# Patient Record
Sex: MALE | Race: WHITE | NOT HISPANIC OR LATINO | Employment: OTHER | URBAN - METROPOLITAN AREA
[De-identification: names, ages, dates, MRNs, and addresses within clinical notes are randomized per-mention and may not be internally consistent; named-entity substitution may affect disease eponyms.]

---

## 2017-04-14 ENCOUNTER — HOSPITAL ENCOUNTER (OUTPATIENT)
Dept: CARDIOLOGY | Facility: HOSPITAL | Age: 72
Discharge: HOME | End: 2017-04-14

## 2017-04-19 ENCOUNTER — APPOINTMENT (OUTPATIENT)
Dept: CARDIOLOGY | Facility: HOSPITAL | Age: 72
End: 2017-04-19

## 2017-06-23 ENCOUNTER — HOSPITAL ENCOUNTER (OUTPATIENT)
Dept: CARDIOLOGY | Facility: HOSPITAL | Age: 72
Discharge: HOME | End: 2017-06-23
Admitting: SURGERY

## 2017-06-23 ENCOUNTER — TRANSCRIBE ORDERS (OUTPATIENT)
Dept: ADMINISTRATIVE | Facility: HOSPITAL | Age: 72
End: 2017-06-23

## 2017-06-23 DIAGNOSIS — I73.9 CLAUDICATION (HCC): Primary | ICD-10-CM

## 2017-06-23 DIAGNOSIS — I73.9 CLAUDICATION (HCC): ICD-10-CM

## 2017-06-23 LAB
BH CV LOWER ARTERIAL LEFT ABI RATIO: 1.06
BH CV LOWER ARTERIAL LEFT DORSALIS PEDIS SYS MAX: 157 MMHG
BH CV LOWER ARTERIAL LEFT GREAT TOE SYS MAX: 143 MMHG
BH CV LOWER ARTERIAL LEFT POST EX ABI RATIO: 1.02
BH CV LOWER ARTERIAL LEFT POST TIBIAL SYS MAX: 166 MMHG
BH CV LOWER ARTERIAL LEFT TBI RATIO: 0.92
BH CV LOWER ARTERIAL RIGHT ABI RATIO: 1.06
BH CV LOWER ARTERIAL RIGHT DORSALIS PEDIS SYS MAX: 157 MMHG
BH CV LOWER ARTERIAL RIGHT GREAT TOE SYS MAX: 141 MMHG
BH CV LOWER ARTERIAL RIGHT POST EX ABI RATIO: 1.04
BH CV LOWER ARTERIAL RIGHT POST TIBIAL SYS MAX: 165 MMHG
BH CV LOWER ARTERIAL RIGHT TBI RATIO: 0.9
UPPER ARTERIAL LEFT ARM BRACHIAL SYS MAX: 151 MMHG
UPPER ARTERIAL RIGHT ARM BRACHIAL SYS MAX: 156 MMHG

## 2017-06-23 PROCEDURE — 93924 LWR XTR VASC STDY BILAT: CPT

## 2021-03-15 ENCOUNTER — BULK ORDERING (OUTPATIENT)
Dept: CASE MANAGEMENT | Facility: OTHER | Age: 76
End: 2021-03-15

## 2021-03-15 DIAGNOSIS — Z23 IMMUNIZATION DUE: ICD-10-CM

## 2022-12-12 ENCOUNTER — APPOINTMENT (OUTPATIENT)
Dept: PREADMISSION TESTING | Facility: HOSPITAL | Age: 77
End: 2022-12-12

## 2022-12-30 ENCOUNTER — PRE-ADMISSION TESTING (OUTPATIENT)
Dept: PREADMISSION TESTING | Facility: HOSPITAL | Age: 77
End: 2022-12-30
Payer: MEDICARE

## 2022-12-30 VITALS
RESPIRATION RATE: 18 BRPM | WEIGHT: 179 LBS | HEART RATE: 59 BPM | TEMPERATURE: 97.6 F | HEIGHT: 72 IN | SYSTOLIC BLOOD PRESSURE: 178 MMHG | OXYGEN SATURATION: 99 % | BODY MASS INDEX: 24.24 KG/M2 | DIASTOLIC BLOOD PRESSURE: 75 MMHG

## 2022-12-30 LAB
ANION GAP SERPL CALCULATED.3IONS-SCNC: 8 MMOL/L (ref 5–15)
BUN SERPL-MCNC: 19 MG/DL (ref 8–23)
BUN/CREAT SERPL: 8.1 (ref 7–25)
CALCIUM SPEC-SCNC: 8.5 MG/DL (ref 8.6–10.5)
CHLORIDE SERPL-SCNC: 102 MMOL/L (ref 98–107)
CO2 SERPL-SCNC: 28 MMOL/L (ref 22–29)
CREAT SERPL-MCNC: 2.34 MG/DL (ref 0.76–1.27)
DEPRECATED RDW RBC AUTO: 43.1 FL (ref 37–54)
EGFRCR SERPLBLD CKD-EPI 2021: 27.9 ML/MIN/1.73
ERYTHROCYTE [DISTWIDTH] IN BLOOD BY AUTOMATED COUNT: 13 % (ref 12.3–15.4)
GLUCOSE SERPL-MCNC: 147 MG/DL (ref 65–99)
HCT VFR BLD AUTO: 34 % (ref 37.5–51)
HGB BLD-MCNC: 11.3 G/DL (ref 13–17.7)
MCH RBC QN AUTO: 30.1 PG (ref 26.6–33)
MCHC RBC AUTO-ENTMCNC: 33.2 G/DL (ref 31.5–35.7)
MCV RBC AUTO: 90.4 FL (ref 79–97)
PLATELET # BLD AUTO: 335 10*3/MM3 (ref 140–450)
PMV BLD AUTO: 9.9 FL (ref 6–12)
POTASSIUM SERPL-SCNC: 5.1 MMOL/L (ref 3.5–5.2)
RBC # BLD AUTO: 3.76 10*6/MM3 (ref 4.14–5.8)
SODIUM SERPL-SCNC: 138 MMOL/L (ref 136–145)
WBC NRBC COR # BLD: 7.67 10*3/MM3 (ref 3.4–10.8)

## 2022-12-30 PROCEDURE — 80048 BASIC METABOLIC PNL TOTAL CA: CPT

## 2022-12-30 PROCEDURE — 85027 COMPLETE CBC AUTOMATED: CPT

## 2022-12-30 PROCEDURE — 36415 COLL VENOUS BLD VENIPUNCTURE: CPT

## 2022-12-30 RX ORDER — PSEUDOEPHEDRINE HCL 30 MG
100 TABLET ORAL AS NEEDED
COMMUNITY
Start: 2022-06-13

## 2022-12-30 RX ORDER — ALBUTEROL SULFATE 90 UG/1
2 AEROSOL, METERED RESPIRATORY (INHALATION) AS NEEDED
COMMUNITY
Start: 2022-12-16

## 2022-12-30 RX ORDER — DEXTROMETHORPHAN HYDROBROMIDE AND PROMETHAZINE HYDROCHLORIDE 15; 6.25 MG/5ML; MG/5ML
5 SYRUP ORAL AS NEEDED
COMMUNITY
Start: 2022-12-06

## 2022-12-30 RX ORDER — GUAIFENESIN 600 MG/1
2 TABLET, EXTENDED RELEASE ORAL DAILY
COMMUNITY
Start: 2022-12-20

## 2022-12-30 RX ORDER — MAGNESIUM OXIDE 400 MG/1
1 TABLET ORAL 2 TIMES DAILY
COMMUNITY
Start: 2022-12-16

## 2022-12-30 NOTE — DISCHARGE INSTRUCTIONS
Take the following medications the morning of surgery with a small sip of water:  AMLODIPINE, COREG, GABAPENTIN, PROTONIX    ARRIVE FOR SURGERY AT 2:00 PM      If you are on prescription narcotic pain medication to control your pain you may also take that medication the morning of surgery.    General Instructions:  Do not eat or drink anything after midnight the night before surgery.  Infants may have breast milk up to four hours before surgery.  Infants drinking formula may drink formula up to six hours before surgery.   Patients who avoid smoking, chewing tobacco and alcohol for 4 weeks prior to surgery have a reduced risk of post-operative complications.  Quit smoking as many days before surgery as you can.  Do not smoke, use chewing tobacco or drink alcohol the day of surgery.   If applicable bring your C-PAP/ BI-PAP machine.  Bring any papers given to you in the doctor’s office.  Wear clean comfortable clothes.  Do not wear contact lenses, false eyelashes or make-up.  Bring a case for your glasses.   Bring crutches or walker if applicable.  Remove all piercings.  Leave jewelry and any other valuables at home.  Hair extensions with metal clips must be removed prior to surgery.  The Pre-Admission Testing nurse will instruct you to bring medications if unable to obtain an accurate list in Pre-Admission Testing.        If you were given a blood bank ID arm band remember to bring it with you the day of surgery.    Preventing a Surgical Site Infection:  For 2 to 3 days before surgery, avoid shaving with a razor because the razor can irritate skin and make it easier to develop an infection.    Any areas of open skin can increase the risk of a post-operative wound infection by allowing bacteria to enter and travel throughout the body.  Notify your surgeon if you have any skin wounds / rashes even if it is not near the expected surgical site.  The area will need assessed to determine if surgery should be delayed until  it is healed.  The night prior to surgery shower using a fresh bar of anti-bacterial soap (such as Dial) and clean washcloth.  Sleep in a clean bed with clean clothing.  Do not allow pets to sleep with you.  Shower on the morning of surgery using a fresh bar of anti-bacterial soap (such as Dial) and clean washcloth.  Dry with a clean towel and dress in clean clothing.  Ask your surgeon if you will be receiving antibiotics prior to surgery.  Make sure you, your family, and all healthcare providers clean their hands with soap and water or an alcohol based hand  before caring for you or your wound.    Day of surgery:  Your arrival time is approximately two hours before your scheduled surgery time.  Upon arrival, a Pre-op nurse and Anesthesiologist will review your health history, obtain vital signs, and answer questions you may have.  The only belongings needed at this time will be your home medications and if applicable your C-PAP/BI-PAP machine.  A Pre-op nurse will start an IV and you may receive medication in preparation for surgery, including something to help you relax.      Please be aware that surgery does come with discomfort.  We want to make every effort to control your discomfort so please discuss any uncontrolled symptoms with your nurse.   Your doctor will most likely have prescribed pain medications.      If you are going home after surgery you will receive individualized written care instructions before being discharged.  A responsible adult must drive you to and from the hospital on the day of your surgery and stay with you for 24 hours.  Discharge prescriptions can be filled by the hospital pharmacy during regular pharmacy hours.  If you are having surgery late in the day/evening your prescription may be e-prescribed to your pharmacy.  Please verify your pharmacy hours or chose a 24 hour pharmacy to avoid not having access to your prescription because your pharmacy has closed for the  day.    If you are staying overnight following surgery, you will be transported to your hospital room following the recovery period.  [facility] has all private rooms.    If you have any questions please call Pre-Admission Testing at [phone].  Deductibles and co-payments are collected on the day of service. Please be prepared to pay the required co-pay, deductible or deposit on the day of service as defined by your plan.    Call your surgeon immediately if you experience any of the following symptoms:  Sore Throat  Shortness of Breath or difficulty breathing  Cough  Chills  Body soreness or muscle pain  Headache  Fever  New loss of taste or smell  Do not arrive for your surgery ill.  Your procedure will need to be rescheduled to another time.  You will need to call your physician before the day of surgery to avoid any unnecessary exposure to hospital staff as well as other patients.

## 2023-01-06 ENCOUNTER — ANESTHESIA EVENT (OUTPATIENT)
Dept: PERIOP | Facility: HOSPITAL | Age: 78
End: 2023-01-06
Payer: MEDICARE

## 2023-01-06 ENCOUNTER — HOSPITAL ENCOUNTER (OUTPATIENT)
Facility: HOSPITAL | Age: 78
Setting detail: HOSPITAL OUTPATIENT SURGERY
Discharge: HOME | End: 2023-01-06
Attending: UROLOGY | Admitting: UROLOGY
Payer: MEDICARE

## 2023-01-06 ENCOUNTER — ANESTHESIA (OUTPATIENT)
Dept: PERIOP | Facility: HOSPITAL | Age: 78
End: 2023-01-06
Payer: MEDICARE

## 2023-01-06 ENCOUNTER — APPOINTMENT (OUTPATIENT)
Dept: GENERAL RADIOLOGY | Facility: HOSPITAL | Age: 78
End: 2023-01-06
Payer: MEDICARE

## 2023-01-06 VITALS
RESPIRATION RATE: 16 BRPM | HEART RATE: 58 BPM | SYSTOLIC BLOOD PRESSURE: 173 MMHG | TEMPERATURE: 97.6 F | OXYGEN SATURATION: 95 % | DIASTOLIC BLOOD PRESSURE: 67 MMHG

## 2023-01-06 DIAGNOSIS — C67.9 BLADDER CANCER: ICD-10-CM

## 2023-01-06 LAB
GLUCOSE BLDC GLUCOMTR-MCNC: 77 MG/DL (ref 70–130)
GLUCOSE BLDC GLUCOMTR-MCNC: 93 MG/DL (ref 70–130)
INR PPP: 1.03 (ref 0.9–1.1)
PROTHROMBIN TIME: 13.7 SECONDS (ref 11.7–14.2)

## 2023-01-06 PROCEDURE — 25010000002 MIDAZOLAM PER 1 MG: Performed by: ANESTHESIOLOGY

## 2023-01-06 PROCEDURE — 74420 UROGRAPHY RTRGR +-KUB: CPT

## 2023-01-06 PROCEDURE — 85610 PROTHROMBIN TIME: CPT | Performed by: UROLOGY

## 2023-01-06 PROCEDURE — 88342 IMHCHEM/IMCYTCHM 1ST ANTB: CPT | Performed by: UROLOGY

## 2023-01-06 PROCEDURE — C1758 CATHETER, URETERAL: HCPCS | Performed by: UROLOGY

## 2023-01-06 PROCEDURE — 88305 TISSUE EXAM BY PATHOLOGIST: CPT | Performed by: UROLOGY

## 2023-01-06 PROCEDURE — 25010000002 PROPOFOL 10 MG/ML EMULSION: Performed by: STUDENT IN AN ORGANIZED HEALTH CARE EDUCATION/TRAINING PROGRAM

## 2023-01-06 PROCEDURE — 88341 IMHCHEM/IMCYTCHM EA ADD ANTB: CPT | Performed by: UROLOGY

## 2023-01-06 PROCEDURE — 25010000002 CEFAZOLIN IN DEXTROSE 2-4 GM/100ML-% SOLUTION: Performed by: UROLOGY

## 2023-01-06 PROCEDURE — 82962 GLUCOSE BLOOD TEST: CPT

## 2023-01-06 PROCEDURE — 25010000002 IOPAMIDOL 61 % SOLUTION: Performed by: UROLOGY

## 2023-01-06 RX ORDER — EPHEDRINE SULFATE 50 MG/ML
INJECTION INTRAVENOUS AS NEEDED
Status: DISCONTINUED | OUTPATIENT
Start: 2023-01-06 | End: 2023-01-06 | Stop reason: SURG

## 2023-01-06 RX ORDER — HYDRALAZINE HYDROCHLORIDE 20 MG/ML
5 INJECTION INTRAMUSCULAR; INTRAVENOUS
Status: DISCONTINUED | OUTPATIENT
Start: 2023-01-06 | End: 2023-01-06 | Stop reason: HOSPADM

## 2023-01-06 RX ORDER — FENTANYL CITRATE 50 UG/ML
50 INJECTION, SOLUTION INTRAMUSCULAR; INTRAVENOUS
Status: DISCONTINUED | OUTPATIENT
Start: 2023-01-06 | End: 2023-01-06 | Stop reason: HOSPADM

## 2023-01-06 RX ORDER — NALOXONE HCL 0.4 MG/ML
0.2 VIAL (ML) INJECTION AS NEEDED
Status: DISCONTINUED | OUTPATIENT
Start: 2023-01-06 | End: 2023-01-06 | Stop reason: HOSPADM

## 2023-01-06 RX ORDER — LIDOCAINE HYDROCHLORIDE 10 MG/ML
0.5 INJECTION, SOLUTION EPIDURAL; INFILTRATION; INTRACAUDAL; PERINEURAL ONCE AS NEEDED
Status: DISCONTINUED | OUTPATIENT
Start: 2023-01-06 | End: 2023-01-06 | Stop reason: HOSPADM

## 2023-01-06 RX ORDER — HYDROCODONE BITARTRATE AND ACETAMINOPHEN 5; 325 MG/1; MG/1
1 TABLET ORAL EVERY 6 HOURS PRN
Qty: 20 TABLET | Refills: 0 | Status: SHIPPED | OUTPATIENT
Start: 2023-01-06

## 2023-01-06 RX ORDER — CEFAZOLIN SODIUM 2 G/100ML
2 INJECTION, SOLUTION INTRAVENOUS ONCE
Status: COMPLETED | OUTPATIENT
Start: 2023-01-06 | End: 2023-01-06

## 2023-01-06 RX ORDER — FLUMAZENIL 0.1 MG/ML
0.2 INJECTION INTRAVENOUS AS NEEDED
Status: DISCONTINUED | OUTPATIENT
Start: 2023-01-06 | End: 2023-01-06 | Stop reason: HOSPADM

## 2023-01-06 RX ORDER — PROPOFOL 10 MG/ML
VIAL (ML) INTRAVENOUS AS NEEDED
Status: DISCONTINUED | OUTPATIENT
Start: 2023-01-06 | End: 2023-01-06 | Stop reason: SURG

## 2023-01-06 RX ORDER — ONDANSETRON 2 MG/ML
4 INJECTION INTRAMUSCULAR; INTRAVENOUS ONCE AS NEEDED
Status: DISCONTINUED | OUTPATIENT
Start: 2023-01-06 | End: 2023-01-06 | Stop reason: HOSPADM

## 2023-01-06 RX ORDER — EPHEDRINE SULFATE 50 MG/ML
5 INJECTION, SOLUTION INTRAVENOUS ONCE AS NEEDED
Status: DISCONTINUED | OUTPATIENT
Start: 2023-01-06 | End: 2023-01-06 | Stop reason: HOSPADM

## 2023-01-06 RX ORDER — SODIUM CHLORIDE 0.9 % (FLUSH) 0.9 %
3 SYRINGE (ML) INJECTION EVERY 12 HOURS SCHEDULED
Status: DISCONTINUED | OUTPATIENT
Start: 2023-01-06 | End: 2023-01-06 | Stop reason: HOSPADM

## 2023-01-06 RX ORDER — SODIUM CHLORIDE, SODIUM LACTATE, POTASSIUM CHLORIDE, CALCIUM CHLORIDE 600; 310; 30; 20 MG/100ML; MG/100ML; MG/100ML; MG/100ML
9 INJECTION, SOLUTION INTRAVENOUS CONTINUOUS
Status: DISCONTINUED | OUTPATIENT
Start: 2023-01-06 | End: 2023-01-06 | Stop reason: HOSPADM

## 2023-01-06 RX ORDER — LIDOCAINE HYDROCHLORIDE 20 MG/ML
INJECTION, SOLUTION INFILTRATION; PERINEURAL AS NEEDED
Status: DISCONTINUED | OUTPATIENT
Start: 2023-01-06 | End: 2023-01-06 | Stop reason: SURG

## 2023-01-06 RX ORDER — DIPHENHYDRAMINE HCL 25 MG
25 CAPSULE ORAL
Status: DISCONTINUED | OUTPATIENT
Start: 2023-01-06 | End: 2023-01-06 | Stop reason: HOSPADM

## 2023-01-06 RX ORDER — PROMETHAZINE HYDROCHLORIDE 25 MG/1
25 SUPPOSITORY RECTAL ONCE AS NEEDED
Status: DISCONTINUED | OUTPATIENT
Start: 2023-01-06 | End: 2023-01-06 | Stop reason: HOSPADM

## 2023-01-06 RX ORDER — LABETALOL HYDROCHLORIDE 5 MG/ML
5 INJECTION, SOLUTION INTRAVENOUS
Status: DISCONTINUED | OUTPATIENT
Start: 2023-01-06 | End: 2023-01-06 | Stop reason: HOSPADM

## 2023-01-06 RX ORDER — CEPHALEXIN 500 MG/1
500 CAPSULE ORAL 2 TIMES DAILY
Qty: 10 CAPSULE | Refills: 0 | Status: SHIPPED | OUTPATIENT
Start: 2023-01-06

## 2023-01-06 RX ORDER — DIPHENHYDRAMINE HYDROCHLORIDE 50 MG/ML
12.5 INJECTION INTRAMUSCULAR; INTRAVENOUS
Status: DISCONTINUED | OUTPATIENT
Start: 2023-01-06 | End: 2023-01-06 | Stop reason: HOSPADM

## 2023-01-06 RX ORDER — MAGNESIUM HYDROXIDE 1200 MG/15ML
LIQUID ORAL AS NEEDED
Status: DISCONTINUED | OUTPATIENT
Start: 2023-01-06 | End: 2023-01-06 | Stop reason: HOSPADM

## 2023-01-06 RX ORDER — MIDAZOLAM HYDROCHLORIDE 1 MG/ML
0.5 INJECTION INTRAMUSCULAR; INTRAVENOUS
Status: DISCONTINUED | OUTPATIENT
Start: 2023-01-06 | End: 2023-01-06 | Stop reason: HOSPADM

## 2023-01-06 RX ORDER — PROMETHAZINE HYDROCHLORIDE 25 MG/1
25 TABLET ORAL ONCE AS NEEDED
Status: DISCONTINUED | OUTPATIENT
Start: 2023-01-06 | End: 2023-01-06 | Stop reason: HOSPADM

## 2023-01-06 RX ORDER — SODIUM CHLORIDE 0.9 % (FLUSH) 0.9 %
3-10 SYRINGE (ML) INJECTION AS NEEDED
Status: DISCONTINUED | OUTPATIENT
Start: 2023-01-06 | End: 2023-01-06 | Stop reason: HOSPADM

## 2023-01-06 RX ORDER — FAMOTIDINE 10 MG/ML
20 INJECTION, SOLUTION INTRAVENOUS ONCE
Status: COMPLETED | OUTPATIENT
Start: 2023-01-06 | End: 2023-01-06

## 2023-01-06 RX ADMIN — PROPOFOL 170 MG: 10 INJECTION, EMULSION INTRAVENOUS at 17:31

## 2023-01-06 RX ADMIN — FAMOTIDINE 20 MG: 10 INJECTION INTRAVENOUS at 15:31

## 2023-01-06 RX ADMIN — SODIUM CHLORIDE, POTASSIUM CHLORIDE, SODIUM LACTATE AND CALCIUM CHLORIDE 9 ML/HR: 600; 310; 30; 20 INJECTION, SOLUTION INTRAVENOUS at 15:31

## 2023-01-06 RX ADMIN — LIDOCAINE HYDROCHLORIDE 100 MG: 20 INJECTION, SOLUTION INFILTRATION; PERINEURAL at 17:31

## 2023-01-06 RX ADMIN — EPHEDRINE SULFATE 5 MG: 50 INJECTION INTRAVENOUS at 17:42

## 2023-01-06 RX ADMIN — EPHEDRINE SULFATE 5 MG: 50 INJECTION INTRAVENOUS at 17:40

## 2023-01-06 RX ADMIN — CEFAZOLIN SODIUM 2 G: 2 INJECTION, SOLUTION INTRAVENOUS at 17:22

## 2023-01-06 RX ADMIN — MIDAZOLAM 0.5 MG: 1 INJECTION INTRAMUSCULAR; INTRAVENOUS at 15:31

## 2023-01-06 NOTE — ANESTHESIA PREPROCEDURE EVALUATION
Anesthesia Evaluation     Patient summary reviewed and Nursing notes reviewed   history of anesthetic complications: difficult airway  NPO Solid Status: > 8 hours  NPO Liquid Status: > 8 hours           Airway   Mallampati: III  Dental - normal exam     Pulmonary - normal exam   (+) pneumonia , a smoker Former, COPD, asthma,sleep apnea,   Cardiovascular - normal exam    (+) hypertension 2 medications or greater, dysrhythmias Atrial Fib, hyperlipidemia,       Neuro/Psych  (+) TIA,    GI/Hepatic/Renal/Endo    (+)  GERD,  liver disease fatty liver disease, renal disease CRI, diabetes mellitus type 2,     Musculoskeletal     Abdominal    Substance History      OB/GYN          Other   arthritis,    history of cancer remission                    Anesthesia Plan    ASA 4     general     intravenous induction     Anesthetic plan, risks, benefits, and alternatives have been provided, discussed and informed consent has been obtained with: patient.        CODE STATUS:

## 2023-01-06 NOTE — PERIOPERATIVE NURSING NOTE
Notified Dr. Ramírez of pt's bp 165/66, no ordfers given.  Also notified Dr. Ramírez of Pneumonia  Hospitalization of 12/15/22 and that pt still has a cough and is using albuterol.  No orders given.

## 2023-01-06 NOTE — ANESTHESIA PROCEDURE NOTES
Airway  Urgency: elective    Date/Time: 1/6/2023 5:33 PM    General Information and Staff    Patient location during procedure: OR  Anesthesiologist: Rommel Churchill MD  CRNA/CAA: Nahum Magallon CRNA    Indications and Patient Condition  Indications for airway management: airway protection    Preoxygenated: yes  MILS not maintained throughout  Mask difficulty assessment: 0 - not attempted    Final Airway Details  Final airway type: supraglottic airway      Successful airway: classic  Size 5     Number of attempts at approach: 1  Assessment: lips, teeth, and gum same as pre-op

## 2023-01-06 NOTE — H&P
FIRST UROLOGY CONSULT      Patient Identification:  NAME:  Humble Rios  Age:  77 y.o.   Sex:  male   :  1945   MRN:  [id number]       Chief complaint: H/o bladder cancer.      History of present illness:  H/o bladder cancer. For cysto B RTGP bladder bx.        Past medical history:  Past Medical History:   Diagnosis Date   • Abrasion     BACK OF LEFT LEG, SCABBED OVER, HEALING   • Anesthesia complication     WIFE STATES HE NEEDS CHILD ET TUBE DUE TO A CURVATURE OF HIS TRACHEA   • Arthritis    • Asthma    • Atrial fibrillation (HCC)    • Bladder cancer (HCC)    • COPD (chronic obstructive pulmonary disease) (HCC)    • Diabetes mellitus (HCC)    • Diverticulitis    • Enlarged prostate    • Fatty liver    • GERD (gastroesophageal reflux disease)    • Hard to intubate    • Hyperlipidemia    • Hypertension    • Pneumonia    • Seasonal allergies    • Skin cancer    • Sleep apnea     NO CPAP MACHINE   • Stage 4 chronic kidney disease (HCC)    • TIA (transient ischemic attack) 2018       Past surgical history:  Past Surgical History:   Procedure Laterality Date   • CARDIAC CATHETERIZATION     • CERVICAL FUSION ANTERIOR WITH ARTIFICIAL DISCECTOMY IMPLANTATION     • CHOLECYSTECTOMY     • COLONOSCOPY     • CYSTOSCOPY BLADDER BIOPSY     • HEMORRHOIDECTOMY      X 2   • HIP SURGERY Right     X 2 FOR BURSA REMOVAL   • INGUINAL HERNIA REPAIR Right    • LUMBAR DECOMPRESSION     • LUMBAR LAMINECTOMY     • TOE AMPUTATION Left     GREAT TOE       Allergies:  Hydrocodone-acetaminophen, Aspirin, Ibuprofen, and Morphine    Home medications:  Medications Prior to Admission   Medication Sig Dispense Refill Last Dose   • albuterol sulfate  (90 Base) MCG/ACT inhaler Inhale 2 puffs As Needed.   2023 at 2200   • amLODIPine (NORVASC) 2.5 MG tablet Take 2.5 mg by mouth Every Morning.   2023 at 0830   • atorvastatin (LIPITOR) 80 MG tablet Take 1 tablet by mouth Daily.   2023 at 0830   • carvedilol (COREG)  12.5 MG tablet TAKE 1 TABLET BY MOUTH IN THE MORNING AND 1 IN THE EVENING WITH MEALS   1/6/2023 at 0830   • gabapentin (NEURONTIN) 300 MG capsule Take 2 capsules by mouth 2 (Two) Times a Day.   1/6/2023 at 0830   • glimepiride (AMARYL) 4 MG tablet Take 0.5-1 tablets by mouth.   1/5/2023 at 2200   • glucose blood test strip Use to test glucose 3 times per day. DX: E11.9 and Z79.4.   1/6/2023   • Insulin Degludec (TRESIBA) 100 UNIT/ML solution injection Inject 12 Units under the skin into the appropriate area as directed Every Night.   1/5/2023   • ketorolac (ACULAR) 0.5 % ophthalmic solution INSTILL 1 DROP THREE TIMES DAILY INTO LEFT EYE FOR 14 DAYS FOLLOWING SURGERY THEN STOP   1/5/2023 at 2200   • losartan (COZAAR) 100 MG tablet Take 1 tablet by mouth Daily.   1/5/2023 at 0830   • magnesium oxide (MAG-OX) 400 MG tablet Take 1 tablet by mouth 2 (Two) Times a Day.   1/5/2023 at 0830   • nicotine (NICODERM CQ) 7 MG/24HR patch Place 1 patch on the skin as directed by provider Daily.   1/5/2023 at 0830   • prednisoLONE acetate (PRED FORTE) 1 % ophthalmic suspension 1 drop.   1/5/2023   • tamsulosin (FLOMAX) 0.4 MG capsule 24 hr capsule Take 1 capsule by mouth.   1/5/2023 at 200   • clopidogrel (PLAVIX) 75 MG tablet Take 1 tablet by mouth Daily.   12/30/2022   • cyclobenzaprine (FLEXERIL) 10 MG tablet Take 1 tablet by mouth 3 (Three) Times a Day As Needed.   More than a month   • docusate sodium 100 MG capsule Take 100 mg by mouth As Needed.   More than a month   • furosemide (LASIX) 20 MG tablet Take 40 mg by mouth Daily.   1/1/2023   • guaiFENesin (MUCINEX) 600 MG 12 hr tablet Take 2 tablets by mouth Daily.   1/4/2023   • moxifloxacin (VIGAMOX) 0.5 % ophthalmic solution INSTILL 1 DROP INTO LEFT EYE THREE TIMES DAILY FOR 3 DAYS BEFORE SURGERY THEN USE EVERY HOUR WHILE AWAKE DAY OF SURGERY, THEN USE FOUR TIMES DAILY FOR 7 DAYS AFTER SURGERY THEN STOP      • multivitamin with minerals tablet tablet Take 1 tablet by mouth  Daily. HOLD FOR SURGERY   2023   • pantoprazole (PROTONIX) 40 MG EC tablet Take 1 tablet by mouth Daily.      • promethazine-dextromethorphan (PROMETHAZINE-DM) 6.25-15 MG/5ML syrup Take 5 mL by mouth As Needed.   2022   • triamcinolone (KENALOG) 0.1 % cream Apply 1 application topically to the appropriate area as directed As Needed.   2023   • warfarin (COUMADIN) 3 MG tablet Take 3 mg by mouth Every Night. as directed   2022        Hospital medications:  sodium chloride, 3 mL, Intravenous, Q12H      lactated ringers, 9 mL/hr, Last Rate: 9 mL/hr (23 1531)      •  fentanyl  •  lidocaine PF 1%  •  midazolam  •  sodium chloride    Family history:  Family History   Problem Relation Age of Onset   • Malig Hyperthermia Neg Hx        Social history:  Social History     Tobacco Use   • Smoking status: Former     Packs/day: 0.10     Types: Cigarettes     Quit date: 2022     Years since quittin.0   • Smokeless tobacco: Never   • Tobacco comments:     USING NICOTINE PATCHES DAILY   Vaping Use   • Vaping Use: Never used   Substance Use Topics   • Alcohol use: Yes     Comment: OCCASSIONAL   • Drug use: Never       Review of systems:      Positive for:  H/o bladder cancer.    Negative for:  Fever.      Objective:  TMax 24 hours:   Temp (24hrs), Av.1 °F (36.7 °C), Min:98.1 °F (36.7 °C), Max:98.1 °F (36.7 °C)      Vitals Ranges:   Temp:  [98.1 °F (36.7 °C)] 98.1 °F (36.7 °C)  Heart Rate:  [57-59] 57  Resp:  [18] 18  BP: (165)/(66) 165/66    Intake/Output Last 3 shifts:  No intake/output data recorded.     Physical Exam:    General Appearance:    Alert, cooperative, NAD   HEENT:    No trauma, pupils reactive, hearing intact   Back:     No CVA tenderness   Lungs:     Respirations unlabored, no wheezing    Heart:    RRR.   Abdomen:     Soft, NDNT, no masses, no guarding   :      Extremities:   No edema, no deformity   Lymphatic:   No neck or groin LAD   Skin:   No bleeding, bruising or rashes    Neuro/Psych:   Orientation intact, mood/affect pleasant, no focal findings       Results review:   I reviewed the patient's new clinical results.    Data review:  Lab Results (last 24 hours)     Procedure Component Value Units Date/Time    Protime-INR [[id number]]  (Normal) Collected: 01/06/23 1516    Specimen: Blood Updated: 01/06/23 1542     Protime 13.7 Seconds      INR 1.03    POC Glucose Once [[id number]]  (Normal) Collected: 01/06/23 1427    Specimen: Blood Updated: 01/06/23 1429     Glucose 93 mg/dL      Comment: Meter: [id number] : [id number] Divya BRONSON              Imaging:  Imaging Results (Last 24 Hours)     ** No results found for the last 24 hours. **             Assessment:       * No active hospital problems. *    H/o bladder cancer.      Plan:     Cysto B RTGP bladder bx.  R/B d/w pt.      Kong Maurer MD  01/06/23  15:47 EST

## 2023-01-07 NOTE — ANESTHESIA POSTPROCEDURE EVALUATION
Patient: Humble Rios    Procedure Summary     Date: 01/06/23 Room / [facility] [facility]    Anesthesia Start: 1728 Anesthesia Stop: 1806    Procedure: CYSTOSCOPY BILATERAL RETROGRADE PYELOGRAM AND BLADDER BIOPSY (Bilateral) Diagnosis:     Surgeons: Kong Maurer MD Provider: Rommel Churchill MD    Anesthesia Type: general ASA Status: 4          Anesthesia Type: general    Vitals  Vitals Value Taken Time   /65 01/06/23 1848   Temp 36.4 °C (97.6 °F) 01/06/23 1842   Pulse 61 01/06/23 1845   Resp 16 01/06/23 1848   SpO2 93 % 01/06/23 1845           Post Anesthesia Care and Evaluation    Patient location during evaluation: PACU  Patient participation: complete - patient participated  Level of consciousness: awake  Pain score: 1  Pain management: adequate    Airway patency: patent  Anesthetic complications: No anesthetic complications  PONV Status: none  Cardiovascular status: acceptable  Respiratory status: acceptable  Hydration status: acceptable

## 2023-01-10 LAB
LAB AP CASE REPORT: NORMAL
LAB AP DIAGNOSIS COMMENT: NORMAL
LAB AP SPECIAL STAINS: NORMAL
PATH REPORT.FINAL DX SPEC: NORMAL
PATH REPORT.GROSS SPEC: NORMAL

## 2023-12-28 ENCOUNTER — HOSPITAL ENCOUNTER (INPATIENT)
Facility: HOSPITAL | Age: 78
LOS: 6 days | Discharge: NURSING, REHAB OR LONG-TERM CARE | End: 2024-01-03
Attending: EMERGENCY MEDICINE | Admitting: INTERNAL MEDICINE
Payer: MEDICARE

## 2023-12-28 ENCOUNTER — APPOINTMENT (OUTPATIENT)
Dept: GENERAL RADIOLOGY | Facility: HOSPITAL | Age: 78
End: 2023-12-28
Payer: MEDICARE

## 2023-12-28 DIAGNOSIS — Z79.01 CHRONIC ANTICOAGULATION: ICD-10-CM

## 2023-12-28 DIAGNOSIS — S72.001A CLOSED DISPLACED FRACTURE OF RIGHT FEMORAL NECK: Primary | ICD-10-CM

## 2023-12-28 DIAGNOSIS — N18.9 CHRONIC KIDNEY DISEASE, UNSPECIFIED CKD STAGE: ICD-10-CM

## 2023-12-28 DIAGNOSIS — C67.9 BLADDER CANCER: ICD-10-CM

## 2023-12-28 LAB
ANION GAP SERPL CALCULATED.3IONS-SCNC: 9 MMOL/L (ref 5–15)
BASOPHILS # BLD AUTO: 0.02 10*3/MM3 (ref 0–0.2)
BASOPHILS NFR BLD AUTO: 0.2 % (ref 0–1.5)
BUN SERPL-MCNC: 28 MG/DL (ref 8–23)
BUN/CREAT SERPL: 11.4 (ref 7–25)
CALCIUM SPEC-SCNC: 8.4 MG/DL (ref 8.6–10.5)
CHLORIDE SERPL-SCNC: 107 MMOL/L (ref 98–107)
CO2 SERPL-SCNC: 25 MMOL/L (ref 22–29)
CREAT SERPL-MCNC: 2.45 MG/DL (ref 0.76–1.27)
DEPRECATED RDW RBC AUTO: 51.5 FL (ref 37–54)
EGFRCR SERPLBLD CKD-EPI 2021: 26.3 ML/MIN/1.73
EOSINOPHIL # BLD AUTO: 0.12 10*3/MM3 (ref 0–0.4)
EOSINOPHIL NFR BLD AUTO: 1.3 % (ref 0.3–6.2)
ERYTHROCYTE [DISTWIDTH] IN BLOOD BY AUTOMATED COUNT: 15 % (ref 12.3–15.4)
GLUCOSE SERPL-MCNC: 146 MG/DL (ref 65–99)
HCT VFR BLD AUTO: 34.6 % (ref 37.5–51)
HGB BLD-MCNC: 11.4 G/DL (ref 13–17.7)
IMM GRANULOCYTES # BLD AUTO: 0.13 10*3/MM3 (ref 0–0.05)
IMM GRANULOCYTES NFR BLD AUTO: 1.4 % (ref 0–0.5)
INR PPP: 2.18 (ref 0.9–1.1)
LYMPHOCYTES # BLD AUTO: 1.47 10*3/MM3 (ref 0.7–3.1)
LYMPHOCYTES NFR BLD AUTO: 15.6 % (ref 19.6–45.3)
MCH RBC QN AUTO: 30.4 PG (ref 26.6–33)
MCHC RBC AUTO-ENTMCNC: 32.9 G/DL (ref 31.5–35.7)
MCV RBC AUTO: 92.3 FL (ref 79–97)
MONOCYTES # BLD AUTO: 0.68 10*3/MM3 (ref 0.1–0.9)
MONOCYTES NFR BLD AUTO: 7.2 % (ref 5–12)
NEUTROPHILS NFR BLD AUTO: 7 10*3/MM3 (ref 1.7–7)
NEUTROPHILS NFR BLD AUTO: 74.3 % (ref 42.7–76)
NRBC BLD AUTO-RTO: 0 /100 WBC (ref 0–0.2)
PLATELET # BLD AUTO: 269 10*3/MM3 (ref 140–450)
PMV BLD AUTO: 9.8 FL (ref 6–12)
POTASSIUM SERPL-SCNC: 4.6 MMOL/L (ref 3.5–5.2)
PROTHROMBIN TIME: 24.7 SECONDS (ref 11.7–14.2)
RBC # BLD AUTO: 3.75 10*6/MM3 (ref 4.14–5.8)
SODIUM SERPL-SCNC: 141 MMOL/L (ref 136–145)
WBC NRBC COR # BLD AUTO: 9.42 10*3/MM3 (ref 3.4–10.8)

## 2023-12-28 PROCEDURE — 25010000002 HYDROMORPHONE 1 MG/ML SOLUTION: Performed by: NURSE PRACTITIONER

## 2023-12-28 PROCEDURE — 25810000003 SODIUM CHLORIDE 0.9 % SOLUTION: Performed by: INTERNAL MEDICINE

## 2023-12-28 PROCEDURE — 85610 PROTHROMBIN TIME: CPT | Performed by: EMERGENCY MEDICINE

## 2023-12-28 PROCEDURE — 36415 COLL VENOUS BLD VENIPUNCTURE: CPT

## 2023-12-28 PROCEDURE — 73502 X-RAY EXAM HIP UNI 2-3 VIEWS: CPT

## 2023-12-28 PROCEDURE — 51702 INSERT TEMP BLADDER CATH: CPT

## 2023-12-28 PROCEDURE — 85025 COMPLETE CBC W/AUTO DIFF WBC: CPT | Performed by: EMERGENCY MEDICINE

## 2023-12-28 PROCEDURE — 25010000002 FENTANYL CITRATE (PF) 50 MCG/ML SOLUTION: Performed by: EMERGENCY MEDICINE

## 2023-12-28 PROCEDURE — 80048 BASIC METABOLIC PNL TOTAL CA: CPT | Performed by: EMERGENCY MEDICINE

## 2023-12-28 PROCEDURE — 25010000002 HYDROMORPHONE PER 4 MG: Performed by: INTERNAL MEDICINE

## 2023-12-28 PROCEDURE — 63710000001 ONDANSETRON ODT 4 MG TABLET DISPERSIBLE: Performed by: INTERNAL MEDICINE

## 2023-12-28 PROCEDURE — 63710000001 INSULIN GLARGINE PER 5 UNITS: Performed by: INTERNAL MEDICINE

## 2023-12-28 PROCEDURE — 71045 X-RAY EXAM CHEST 1 VIEW: CPT

## 2023-12-28 PROCEDURE — 99285 EMERGENCY DEPT VISIT HI MDM: CPT

## 2023-12-28 PROCEDURE — 51798 US URINE CAPACITY MEASURE: CPT

## 2023-12-28 RX ORDER — FENTANYL CITRATE 50 UG/ML
25 INJECTION, SOLUTION INTRAMUSCULAR; INTRAVENOUS
Status: COMPLETED | OUTPATIENT
Start: 2023-12-28 | End: 2023-12-28

## 2023-12-28 RX ORDER — ACETAMINOPHEN 325 MG/1
650 TABLET ORAL EVERY 4 HOURS PRN
Status: DISCONTINUED | OUTPATIENT
Start: 2023-12-28 | End: 2024-01-03 | Stop reason: HOSPADM

## 2023-12-28 RX ORDER — FUROSEMIDE 40 MG/1
40 TABLET ORAL DAILY
Status: DISCONTINUED | OUTPATIENT
Start: 2023-12-29 | End: 2023-12-29

## 2023-12-28 RX ORDER — AMOXICILLIN 250 MG
2 CAPSULE ORAL 2 TIMES DAILY
Status: DISCONTINUED | OUTPATIENT
Start: 2023-12-28 | End: 2024-01-03 | Stop reason: HOSPADM

## 2023-12-28 RX ORDER — NALOXONE HCL 0.4 MG/ML
0.4 VIAL (ML) INJECTION
Status: DISCONTINUED | OUTPATIENT
Start: 2023-12-28 | End: 2023-12-28

## 2023-12-28 RX ORDER — POLYETHYLENE GLYCOL 3350 17 G/17G
17 POWDER, FOR SOLUTION ORAL DAILY PRN
Status: DISCONTINUED | OUTPATIENT
Start: 2023-12-28 | End: 2024-01-03 | Stop reason: HOSPADM

## 2023-12-28 RX ORDER — IPRATROPIUM BROMIDE 42 UG/1
2 SPRAY, METERED NASAL 4 TIMES DAILY
Status: DISCONTINUED | OUTPATIENT
Start: 2023-12-28 | End: 2023-12-29

## 2023-12-28 RX ORDER — ATENOLOL 100 MG/1
100 TABLET ORAL DAILY
COMMUNITY

## 2023-12-28 RX ORDER — AMLODIPINE BESYLATE 5 MG/1
5 TABLET ORAL DAILY
Status: DISCONTINUED | OUTPATIENT
Start: 2023-12-29 | End: 2023-12-30

## 2023-12-28 RX ORDER — ONDANSETRON 4 MG/1
4 TABLET, ORALLY DISINTEGRATING ORAL EVERY 6 HOURS PRN
Status: DISCONTINUED | OUTPATIENT
Start: 2023-12-28 | End: 2024-01-03 | Stop reason: HOSPADM

## 2023-12-28 RX ORDER — LOSARTAN POTASSIUM 100 MG/1
100 TABLET ORAL DAILY
Status: DISCONTINUED | OUTPATIENT
Start: 2023-12-29 | End: 2024-01-03 | Stop reason: HOSPADM

## 2023-12-28 RX ORDER — CYCLOBENZAPRINE HCL 10 MG
10 TABLET ORAL 3 TIMES DAILY PRN
Status: DISCONTINUED | OUTPATIENT
Start: 2023-12-28 | End: 2024-01-03 | Stop reason: HOSPADM

## 2023-12-28 RX ORDER — HYDROMORPHONE HYDROCHLORIDE 1 MG/ML
0.5 INJECTION, SOLUTION INTRAMUSCULAR; INTRAVENOUS; SUBCUTANEOUS
Status: DISCONTINUED | OUTPATIENT
Start: 2023-12-28 | End: 2023-12-28

## 2023-12-28 RX ORDER — HYDROCODONE BITARTRATE AND ACETAMINOPHEN 5; 325 MG/1; MG/1
1 TABLET ORAL EVERY 4 HOURS PRN
Status: DISCONTINUED | OUTPATIENT
Start: 2023-12-28 | End: 2024-01-03 | Stop reason: HOSPADM

## 2023-12-28 RX ORDER — GABAPENTIN 300 MG/1
600 CAPSULE ORAL 2 TIMES DAILY
Status: DISCONTINUED | OUTPATIENT
Start: 2023-12-28 | End: 2024-01-03 | Stop reason: HOSPADM

## 2023-12-28 RX ORDER — IPRATROPIUM BROMIDE 42 UG/1
2 SPRAY, METERED NASAL 4 TIMES DAILY
COMMUNITY

## 2023-12-28 RX ORDER — ALBUTEROL SULFATE 2.5 MG/3ML
2.5 SOLUTION RESPIRATORY (INHALATION) EVERY 6 HOURS PRN
Status: DISCONTINUED | OUTPATIENT
Start: 2023-12-28 | End: 2024-01-03 | Stop reason: HOSPADM

## 2023-12-28 RX ORDER — DIPHENHYDRAMINE HCL 25 MG
25 CAPSULE ORAL EVERY 6 HOURS PRN
Status: DISCONTINUED | OUTPATIENT
Start: 2023-12-28 | End: 2024-01-03 | Stop reason: HOSPADM

## 2023-12-28 RX ORDER — UREA 10 %
3 LOTION (ML) TOPICAL NIGHTLY PRN
Status: DISCONTINUED | OUTPATIENT
Start: 2023-12-28 | End: 2024-01-03 | Stop reason: HOSPADM

## 2023-12-28 RX ORDER — ATENOLOL 50 MG/1
100 TABLET ORAL DAILY
Status: DISCONTINUED | OUTPATIENT
Start: 2023-12-29 | End: 2024-01-03 | Stop reason: HOSPADM

## 2023-12-28 RX ORDER — GLIMEPIRIDE 2 MG/1
2 TABLET ORAL NIGHTLY
COMMUNITY

## 2023-12-28 RX ORDER — TAMSULOSIN HYDROCHLORIDE 0.4 MG/1
0.4 CAPSULE ORAL 2 TIMES DAILY
Status: DISCONTINUED | OUTPATIENT
Start: 2023-12-28 | End: 2024-01-03 | Stop reason: HOSPADM

## 2023-12-28 RX ORDER — SODIUM CHLORIDE 0.9 % (FLUSH) 0.9 %
10 SYRINGE (ML) INJECTION AS NEEDED
Status: DISCONTINUED | OUTPATIENT
Start: 2023-12-28 | End: 2024-01-03 | Stop reason: HOSPADM

## 2023-12-28 RX ORDER — MULTIPLE VITAMINS W/ MINERALS TAB 9MG-400MCG
1 TAB ORAL DAILY
Status: DISCONTINUED | OUTPATIENT
Start: 2023-12-29 | End: 2024-01-03 | Stop reason: HOSPADM

## 2023-12-28 RX ORDER — ATORVASTATIN CALCIUM 80 MG/1
80 TABLET, FILM COATED ORAL DAILY
Status: DISCONTINUED | OUTPATIENT
Start: 2023-12-29 | End: 2024-01-03 | Stop reason: HOSPADM

## 2023-12-28 RX ORDER — NALOXONE HCL 0.4 MG/ML
0.4 VIAL (ML) INJECTION
Status: DISCONTINUED | OUTPATIENT
Start: 2023-12-28 | End: 2024-01-03 | Stop reason: HOSPADM

## 2023-12-28 RX ORDER — PANTOPRAZOLE SODIUM 40 MG/1
40 TABLET, DELAYED RELEASE ORAL
Status: DISCONTINUED | OUTPATIENT
Start: 2023-12-29 | End: 2024-01-03 | Stop reason: HOSPADM

## 2023-12-28 RX ORDER — INSULIN DEGLUDEC 100 U/ML
14 INJECTION, SOLUTION SUBCUTANEOUS NIGHTLY
Status: DISCONTINUED | OUTPATIENT
Start: 2023-12-28 | End: 2023-12-28 | Stop reason: CLARIF

## 2023-12-28 RX ORDER — SODIUM CHLORIDE 9 MG/ML
75 INJECTION, SOLUTION INTRAVENOUS CONTINUOUS
Status: DISCONTINUED | OUTPATIENT
Start: 2023-12-28 | End: 2023-12-31

## 2023-12-28 RX ORDER — FUROSEMIDE 40 MG/1
40 TABLET ORAL DAILY PRN
Status: DISCONTINUED | OUTPATIENT
Start: 2023-12-28 | End: 2024-01-03 | Stop reason: HOSPADM

## 2023-12-28 RX ORDER — ONDANSETRON 2 MG/ML
4 INJECTION INTRAMUSCULAR; INTRAVENOUS EVERY 6 HOURS PRN
Status: DISCONTINUED | OUTPATIENT
Start: 2023-12-28 | End: 2024-01-03 | Stop reason: HOSPADM

## 2023-12-28 RX ORDER — BISACODYL 5 MG/1
5 TABLET, DELAYED RELEASE ORAL DAILY PRN
Status: DISCONTINUED | OUTPATIENT
Start: 2023-12-28 | End: 2024-01-03 | Stop reason: HOSPADM

## 2023-12-28 RX ORDER — BISACODYL 10 MG
10 SUPPOSITORY, RECTAL RECTAL DAILY PRN
Status: DISCONTINUED | OUTPATIENT
Start: 2023-12-28 | End: 2024-01-03 | Stop reason: HOSPADM

## 2023-12-28 RX ORDER — FUROSEMIDE 40 MG/1
40 TABLET ORAL DAILY PRN
COMMUNITY

## 2023-12-28 RX ADMIN — FENTANYL CITRATE 25 MCG: 50 INJECTION, SOLUTION INTRAMUSCULAR; INTRAVENOUS at 18:46

## 2023-12-28 RX ADMIN — FENTANYL CITRATE 25 MCG: 50 INJECTION, SOLUTION INTRAMUSCULAR; INTRAVENOUS at 15:25

## 2023-12-28 RX ADMIN — GABAPENTIN 600 MG: 300 CAPSULE ORAL at 21:12

## 2023-12-28 RX ADMIN — HYDROCODONE BITARTRATE AND ACETAMINOPHEN 1 TABLET: 5; 325 TABLET ORAL at 21:47

## 2023-12-28 RX ADMIN — CYCLOBENZAPRINE 10 MG: 10 TABLET, FILM COATED ORAL at 21:13

## 2023-12-28 RX ADMIN — ONDANSETRON 4 MG: 4 TABLET, ORALLY DISINTEGRATING ORAL at 21:45

## 2023-12-28 RX ADMIN — FENTANYL CITRATE 25 MCG: 50 INJECTION, SOLUTION INTRAMUSCULAR; INTRAVENOUS at 16:59

## 2023-12-28 RX ADMIN — INSULIN GLARGINE 14 UNITS: 100 INJECTION, SOLUTION SUBCUTANEOUS at 21:48

## 2023-12-28 RX ADMIN — HYDROMORPHONE HYDROCHLORIDE 0.5 MG: 1 INJECTION, SOLUTION INTRAMUSCULAR; INTRAVENOUS; SUBCUTANEOUS at 20:09

## 2023-12-28 RX ADMIN — SODIUM CHLORIDE 75 ML/HR: 9 INJECTION, SOLUTION INTRAVENOUS at 20:09

## 2023-12-28 RX ADMIN — HYDROMORPHONE HYDROCHLORIDE 1 MG: 1 INJECTION, SOLUTION INTRAMUSCULAR; INTRAVENOUS; SUBCUTANEOUS at 21:58

## 2023-12-28 NOTE — Clinical Note
The DP pulses are +2 bilaterally. The PT pulses are +1 bilaterally. The right radial pulse is +2. The right ulnar pulse is +1. The femoral pulses are +2 bilaterally.

## 2023-12-28 NOTE — PROGRESS NOTES
Clinical Pharmacy Services: Medication History    Humble Rios is a 78 y.o. male presenting to [facility] for   Chief Complaint   Patient presents with    Fall    Hip Pain       He  has a past medical history of Abrasion, Anesthesia complication, Arthritis, Asthma, Atrial fibrillation, Bladder cancer, COPD (chronic obstructive pulmonary disease), Diabetes mellitus, Diverticulitis, Enlarged prostate, Fatty liver, GERD (gastroesophageal reflux disease), Hard to intubate, Hyperlipidemia, Hypertension, Pneumonia, Seasonal allergies, Skin cancer, Sleep apnea, Stage 4 chronic kidney disease, and TIA (transient ischemic attack) (2018).    Allergies as of 12/28/2023 - Reviewed 12/28/2023   Allergen Reaction Noted    Hydrocodone-acetaminophen Itching 12/30/2022    Aspirin GI Intolerance and Other (See Comments) 04/09/2013    Ibuprofen Nausea And Vomiting 04/09/2013    Morphine Hives and Itching 09/05/2014       Medication information was obtained from: Family Member  Pharmacy and Phone Number:     Prior to Admission Medications       Prescriptions Last Dose Informant Patient Reported? Taking?    amLODIPine (NORVASC) 5 MG tablet  Family Member Yes Yes    Take 1 tablet by mouth Daily.    atenolol (TENORMIN) 100 MG tablet  Family Member Yes Yes    Take 1 tablet by mouth Daily.    atorvastatin (LIPITOR) 80 MG tablet  Family Member Yes Yes    Take 1 tablet by mouth Daily.    clopidogrel (PLAVIX) 75 MG tablet  Family Member Yes Yes    Take 1 tablet by mouth Daily.    cyclobenzaprine (FLEXERIL) 10 MG tablet  Family Member Yes Yes    Take 1 tablet by mouth 3 (Three) Times a Day As Needed for Muscle Spasms.    docusate sodium 100 MG capsule  Family Member Yes Yes    Take 1 capsule by mouth As Needed.    furosemide (LASIX) 40 MG tablet  Family Member Yes Yes    Take 1 tablet by mouth Daily As Needed.    gabapentin (NEURONTIN) 300 MG capsule  Family Member Yes Yes    Take 2 capsules by mouth 2 (Two) Times a Day.     glimepiride (AMARYL) 2 MG tablet  Family Member Yes Yes    Take 1 tablet by mouth Every Night.    glimepiride (AMARYL) 4 MG tablet  Family Member Yes Yes    Take 1 tablet by mouth Every Morning.    Insulin Aspart 100 UNIT/ML solution cartridge  Family Member Yes Yes    Inject  under the skin into the appropriate area as directed See Admin Instructions. Sliding Scale    Insulin Degludec (TRESIBA) 100 UNIT/ML solution injection  Family Member Yes Yes    Inject 14 Units under the skin into the appropriate area as directed Every Night.    ipratropium (ATROVENT) 0.06 % nasal spray  Family Member Yes Yes    2 sprays into the nostril(s) as directed by provider 4 (Four) Times a Day.    losartan (COZAAR) 100 MG tablet  Family Member Yes Yes    Take 1 tablet by mouth Daily.    multivitamin with minerals tablet tablet  Family Member Yes Yes    Take 1 tablet by mouth Daily.    pantoprazole (PROTONIX) 40 MG EC tablet  Family Member Yes Yes    Take 1 tablet by mouth Daily.    prednisoLONE acetate (PRED FORTE) 1 % ophthalmic suspension  Family Member Yes Yes    Administer 1 drop to both eyes Daily.    tamsulosin (FLOMAX) 0.4 MG capsule 24 hr capsule  Family Member Yes Yes    Take 1 capsule by mouth 2 (Two) Times a Day.    triamcinolone (KENALOG) 0.1 % cream  Family Member Yes Yes    Apply 1 application  topically to the appropriate area as directed 2 (Two) Times a Day As Needed.    warfarin (COUMADIN) 3 MG tablet  Family Member Yes Yes    Take 1 tablet by mouth Every Night.    albuterol sulfate  (90 Base) MCG/ACT inhaler   Yes No    Inhale 2 puffs As Needed.    carvedilol (COREG) 12.5 MG tablet   Yes No    TAKE 1 TABLET BY MOUTH IN THE MORNING AND 1 IN THE EVENING WITH MEALS    cephalexin (Keflex) 500 MG capsule   No No    Take 1 capsule by mouth 2 (Two) Times a Day.    furosemide (LASIX) 20 MG tablet   Yes No    Take 2 tablets by mouth Daily.    glucose blood test strip   Yes No    Use to test glucose 3 times per day. DX:  E11.9 and Z79.4.    guaiFENesin (MUCINEX) 600 MG 12 hr tablet   Yes No    Take 2 tablets by mouth Daily.    HYDROcodone-acetaminophen (Norco) 5-325 MG per tablet   No No    Take 1 tablet by mouth Every 6 (Six) Hours As Needed for Moderate Pain.    ketorolac (ACULAR) 0.5 % ophthalmic solution   Yes No    INSTILL 1 DROP THREE TIMES DAILY INTO LEFT EYE FOR 14 DAYS FOLLOWING SURGERY THEN STOP    magnesium oxide (MAG-OX) 400 MG tablet   Yes No    Take 1 tablet by mouth 2 (Two) Times a Day.    moxifloxacin (VIGAMOX) 0.5 % ophthalmic solution   Yes No    INSTILL 1 DROP INTO LEFT EYE THREE TIMES DAILY FOR 3 DAYS BEFORE SURGERY THEN USE EVERY HOUR WHILE AWAKE DAY OF SURGERY, THEN USE FOUR TIMES DAILY FOR 7 DAYS AFTER SURGERY THEN STOP    nicotine (NICODERM CQ) 7 MG/24HR patch   Yes No    Place 1 patch on the skin as directed by provider Daily.    promethazine-dextromethorphan (PROMETHAZINE-DM) 6.25-15 MG/5ML syrup   Yes No    Take 5 mL by mouth As Needed.              Medication notes: Family member stated pt takes aspart insulin but goes by a sliding scale unsure the exact dosage nor is there any set frequency pt just takes per BS as needed.     This medication list is complete to the best of my knowledge as of 12/28/2023    Please call if questions.    Rosemary Nelson  Medication History Technician   [phone]    12/28/2023 17:15 EST

## 2023-12-28 NOTE — Clinical Note
Hemostasis started on the right radial artery. R-Band was used in achieving hemostasis. Radial compression device applied to vessel. Hemostasis achieved successfully. Closure device additional comment: 13 cc of air in band

## 2023-12-28 NOTE — ED NOTES
Nursing report ED to floor  Humble Rios  78 y.o.  male    HPI :   Chief Complaint   Patient presents with    Fall    Hip Pain       Admitting doctor:   Anabell Land MD    Admitting diagnosis:   The primary encounter diagnosis was Closed displaced fracture of right femoral neck. Diagnoses of Chronic kidney disease, unspecified CKD stage and Chronic anticoagulation were also pertinent to this visit.    Code status:   Current Code Status       Date Active Code Status Order ID Comments User Context       Not on file            Allergies:   Hydrocodone-acetaminophen, Aspirin, Ibuprofen, and Morphine    Intake and Output  No intake or output data in the 24 hours ending 12/28/23 1743    Weight:   There were no vitals filed for this visit.    Most recent vitals:   Vitals:    12/28/23 1438   BP: (!) 188/65   Pulse: 62   Resp: 20   Temp: 97.2 °F (36.2 °C)   TempSrc: Tympanic   SpO2: 98%       Active LDAs/IV Access:   Lines, Drains & Airways       Active LDAs       Name Placement date Placement time Site Days    Peripheral IV 12/28/23 Left Antecubital 12/28/23  --  Antecubital  less than 1                    Labs (abnormal labs have a star):   Labs Reviewed   BASIC METABOLIC PANEL - Abnormal; Notable for the following components:       Result Value    Glucose 146 (*)     BUN 28 (*)     Creatinine 2.45 (*)     Calcium 8.4 (*)     eGFR 26.3 (*)     All other components within normal limits    Narrative:     GFR Normal >60  Chronic Kidney Disease <60  Kidney Failure <15    The GFR formula is only valid for adults with stable renal function between ages 18 and 70.   PROTIME-INR - Abnormal; Notable for the following components:    Protime 24.7 (*)     INR 2.18 (*)     All other components within normal limits   CBC WITH AUTO DIFFERENTIAL - Abnormal; Notable for the following components:    RBC 3.75 (*)     Hemoglobin 11.4 (*)     Hematocrit 34.6 (*)     Lymphocyte % 15.6 (*)     Immature Grans % 1.4 (*)     Immature  Grans, Absolute 0.13 (*)     All other components within normal limits   CBC AND DIFFERENTIAL    Narrative:     The following orders were created for panel order CBC & Differential.  Procedure                               Abnormality         Status                     ---------                               -----------         ------                     CBC Auto Differential[[id number]]        Abnormal            Final result                 Please view results for these tests on the individual orders.       EKG:   No orders to display       Meds given in ED:   Medications   sodium chloride 0.9 % flush 10 mL (has no administration in time range)   fentaNYL citrate (PF) (SUBLIMAZE) injection 25 mcg (25 mcg Intravenous Given 23 5461)       Imaging results:  No radiology results for the last day    Ambulatory status:   - BR    Social issues:   Social History     Socioeconomic History    Marital status:    Tobacco Use    Smoking status: Former     Packs/day: .1     Types: Cigarettes     Quit date: 2022     Years since quittin.0    Smokeless tobacco: Never    Tobacco comments:     USING NICOTINE PATCHES DAILY   Vaping Use    Vaping Use: Never used   Substance and Sexual Activity    Alcohol use: Yes     Comment: OCCASSIONAL    Drug use: Never    Sexual activity: Defer       NIH Stroke Scale:        Nursing report ED to floor:

## 2023-12-28 NOTE — Clinical Note
"Per , 4n room is cleaned, but still showing up as \"cleaning\". Okay to transfer patient to 4n room"

## 2023-12-28 NOTE — Clinical Note
PRIMARY DIAGNOSIS: Fever, Elevated LFTs  OUTPATIENT/OBSERVATION GOALS TO BE MET BEFORE DISCHARGE:  1. ADLs back to baseline: Yes    2. Activity and level of assistance: Up with standby assistance.    3. Pain status: Pain free.    4. Return to near baseline physical activity: Yes     Discharge Planner Nurse   Safe discharge environment identified: Yes  Barriers to discharge: Yes       Entered by: Zakiya Metcalf RN 02/08/2023 8:11 PM  Pt A/O x4 Up SBA. Room air.  ml/hr Tolerating PO. Plan is observe 24 hrs then discharge tomorrow.  Will continue to monitor.   2035: Spoke to [facility] receiving RN. 1900 IV Zosyn dose not given in ED - Patient scheduled to trf to floor at 1840. But patient requested to have dinner first. Assigned RN not aware patient still in the ER. PT transferred to [facility] about 1945.     The right coronary artery was selectively engaged, injected and visualized.

## 2023-12-28 NOTE — ED PROVIDER NOTES
EMERGENCY DEPARTMENT ENCOUNTER    Room Number:  38/38  PCP: Dereck Miramontes MD  Historian: Patient      HPI:  Chief Complaint: Fall, right hip pain  A complete HPI/ROS/PMH/PSH/SH/FH are unobtainable due to: Nothing  Context: Humble Rios is a 78 y.o. male who presents to the ED c/o right hip pain after he fell down some ladder steps going up to his attic prior to arrival.  He denies head trauma or loss of conscious.  He is on warfarin and Plavix.  He fell directly on his right hip and reports he cannot move his hip secondary to pain.            PAST MEDICAL HISTORY  Active Ambulatory Problems     Diagnosis Date Noted    No Active Ambulatory Problems     Resolved Ambulatory Problems     Diagnosis Date Noted    No Resolved Ambulatory Problems     Past Medical History:   Diagnosis Date    Abrasion     Anesthesia complication     Arthritis     Asthma     Atrial fibrillation     Bladder cancer     COPD (chronic obstructive pulmonary disease)     Diabetes mellitus     Diverticulitis     Enlarged prostate     Fatty liver     GERD (gastroesophageal reflux disease)     Hard to intubate     Hyperlipidemia     Hypertension     Pneumonia     Seasonal allergies     Skin cancer     Sleep apnea     Stage 4 chronic kidney disease     TIA (transient ischemic attack) 2018         PAST SURGICAL HISTORY  Past Surgical History:   Procedure Laterality Date    CARDIAC CATHETERIZATION      CERVICAL FUSION ANTERIOR WITH ARTIFICIAL DISCECTOMY IMPLANTATION      CHOLECYSTECTOMY      COLONOSCOPY      CYSTOSCOPY BLADDER BIOPSY      CYSTOSCOPY BLADDER BIOPSY Bilateral 1/6/2023    Procedure: CYSTOSCOPY BILATERAL RETROGRADE PYELOGRAM AND BLADDER BIOPSY;  Surgeon: Kong Maurer MD;  Location: [facility];  Service: Urology;  Laterality: Bilateral;    HEMORRHOIDECTOMY      X 2    HIP SURGERY Right     X 2 FOR BURSA REMOVAL    INGUINAL HERNIA REPAIR Right     LUMBAR DECOMPRESSION      LUMBAR LAMINECTOMY      TOE AMPUTATION Left      GREAT TOE         FAMILY HISTORY  Family History   Problem Relation Age of Onset    Malig Hyperthermia Neg Hx          SOCIAL HISTORY  Social History     Socioeconomic History    Marital status:    Tobacco Use    Smoking status: Former     Packs/day: .1     Types: Cigarettes     Quit date: 2022     Years since quittin.0    Smokeless tobacco: Never    Tobacco comments:     USING NICOTINE PATCHES DAILY   Vaping Use    Vaping Use: Never used   Substance and Sexual Activity    Alcohol use: Yes     Comment: OCCASSIONAL    Drug use: Never    Sexual activity: Defer         ALLERGIES  Hydrocodone-acetaminophen, Aspirin, Ibuprofen, and Morphine        REVIEW OF SYSTEMS  Review of Systems   Review of all 14 systems is negative other than stated in the HPI above.      PHYSICAL EXAM  ED Triage Vitals [23 1438]   Temp Heart Rate Resp BP SpO2   97.2 °F (36.2 °C) 62 20 (!) 188/65 98 %      Temp src Heart Rate Source Patient Position BP Location FiO2 (%)   Tympanic Monitor -- -- --       Physical Exam      GENERAL: Awake and alert, no acute distress  HENT: nares patent, no scalp hematoma  EYES: no scleral icterus, pupils 3 mm reactive bilaterally  CV: regular rhythm, normal rate  RESPIRATORY: normal effort  ABDOMEN: soft, nondistended  MUSCULOSKELETAL: no deformity, point tenderness over the right greater trochanter, pelvis stable to compression.  No shortening of the right lower extremity, no rotation.  Pain with passive ranging of the right hip.  NEURO: alert, moves all extremities, follows commands, GCS 15, cranial nerves II through XII grossly intact  PSYCH:  calm, cooperative  SKIN: warm, dry    Vital signs and nursing notes reviewed.          LAB RESULTS  Recent Results (from the past 24 hour(s))   Basic Metabolic Panel    Collection Time: 23  3:38 PM    Specimen: Blood   Result Value Ref Range    Glucose 146 (H) 65 - 99 mg/dL    BUN 28 (H) 8 - 23 mg/dL    Creatinine 2.45 (H) 0.76 - 1.27 mg/dL     Sodium 141 136 - 145 mmol/L    Potassium 4.6 3.5 - 5.2 mmol/L    Chloride 107 98 - 107 mmol/L    CO2 25.0 22.0 - 29.0 mmol/L    Calcium 8.4 (L) 8.6 - 10.5 mg/dL    BUN/Creatinine Ratio 11.4 7.0 - 25.0    Anion Gap 9.0 5.0 - 15.0 mmol/L    eGFR 26.3 (L) >60.0 mL/min/1.73   Protime-INR    Collection Time: 12/28/23  3:38 PM    Specimen: Blood   Result Value Ref Range    Protime 24.7 (H) 11.7 - 14.2 Seconds    INR 2.18 (H) 0.90 - 1.10   CBC Auto Differential    Collection Time: 12/28/23  3:38 PM    Specimen: Blood   Result Value Ref Range    WBC 9.42 3.40 - 10.80 10*3/mm3    RBC 3.75 (L) 4.14 - 5.80 10*6/mm3    Hemoglobin 11.4 (L) 13.0 - 17.7 g/dL    Hematocrit 34.6 (L) 37.5 - 51.0 %    MCV 92.3 79.0 - 97.0 fL    MCH 30.4 26.6 - 33.0 pg    MCHC 32.9 31.5 - 35.7 g/dL    RDW 15.0 12.3 - 15.4 %    RDW-SD 51.5 37.0 - 54.0 fl    MPV 9.8 6.0 - 12.0 fL    Platelets 269 140 - 450 10*3/mm3    Neutrophil % 74.3 42.7 - 76.0 %    Lymphocyte % 15.6 (L) 19.6 - 45.3 %    Monocyte % 7.2 5.0 - 12.0 %    Eosinophil % 1.3 0.3 - 6.2 %    Basophil % 0.2 0.0 - 1.5 %    Immature Grans % 1.4 (H) 0.0 - 0.5 %    Neutrophils, Absolute 7.00 1.70 - 7.00 10*3/mm3    Lymphocytes, Absolute 1.47 0.70 - 3.10 10*3/mm3    Monocytes, Absolute 0.68 0.10 - 0.90 10*3/mm3    Eosinophils, Absolute 0.12 0.00 - 0.40 10*3/mm3    Basophils, Absolute 0.02 0.00 - 0.20 10*3/mm3    Immature Grans, Absolute 0.13 (H) 0.00 - 0.05 10*3/mm3    nRBC 0.0 0.0 - 0.2 /100 WBC       Ordered the above labs and reviewed the results.        RADIOLOGY  XR Hip With or Without Pelvis 2 - 3 View Right    Result Date: 12/28/2023  PROCEDURE:  XR HIP W OR WO PELVIS 2-3 VIEW RIGHT-  HISTORY: Fall from steps, right hip pain.  COMPARISON: None.  FINDINGS:   3 views of the pelvis and right hip were obtained.  There is an acute complete transcervical right femoral neck fracture with minimal widening across the fracture line. There is no dislocation. There is degenerative disc disease in the  visualized lower lumbar spine. There is calcific atherosclerosis.  This report was finalized on 12/28/2023 4:58 PM by Dr. Buffy Alanis M.D on Workstation: [email or web address]       Ordered the above noted radiological studies. Reviewed by me in PACS.            PROCEDURES  Procedures              MEDICATIONS GIVEN IN ER  Medications   sodium chloride 0.9 % flush 10 mL (has no administration in time range)   fentaNYL citrate (PF) (SUBLIMAZE) injection 25 mcg (25 mcg Intravenous Given 12/28/23 1659)                   MEDICAL DECISION MAKING, PROGRESS, and CONSULTS    All labs have been independently reviewed by me.  All radiology studies have been reviewed by me and I have also reviewed the radiology report.   EKG's independently viewed and interpreted by me.  Discussion below represents my analysis of pertinent findings related to patient's condition, differential diagnosis, treatment plan and final disposition.      Additional sources:  - Discussed/ obtained information from independent historians: N/A    - External (non-ED) record review: Prior op note reviewed from 1/6/2023.  Patient underwent cystoscopy with urology.    - Chronic or social conditions impacting care: Chronic anticoagulation, peripheral artery disease    - Shared decision making: Patient informed of plan for admission for definitive fixation of right femoral neck fracture      Orders placed during this visit:  Orders Placed This Encounter   Procedures    XR Hip With or Without Pelvis 2 - 3 View Right    XR Chest 1 View    Basic Metabolic Panel    Protime-INR    CBC Auto Differential    Pulse Oximetry, Continuous    Ortho (on-call MD unless specified)    [facility] (on-call MD unless specified) Details    Insert Peripheral IV    Inpatient Admission    CBC & Differential           Differential diagnosis includes but is not limited to:    Femoral neck fracture  Intertrochanteric femur fracture  Hip dislocation  Pubic ramus fracture        Independent interpretation of  labs, radiology studies, and discussions with consultants:  ED Course as of 12/28/23 1706   Thu Dec 28, 2023   5741 First look:  Patient presents with complaint of right hip pain after he fell off some attic steps and landed directly his right hip.  He denies head trauma or loss of conscious.  He is on warfarin for atrial fibrillation, Plavix for peripheral vascular disease.  He reports he cannot move his right leg secondary to the pain.    On exam he is awake and alert, GCS 15, no scalp hematoma, no midline C-spine tenderness or step-off.  He is moving all extremities but he cannot move the right lower extremity secondary to pain.  There is pain with passive ranging of the right hip, no right lower extremity shortening, positive point tenderness of the right greater trochanter. [JR]   1616 INR(!): 2.18 [JR]   1616 Hemoglobin(!): 11.4 [JR]   1616 WBC: 9.42 [JR]   1616 Creatinine(!): 2.45  Stable CKD [JR]   1636 Plain films of the right hip independently interpreted in PACS and demonstrate minimally displaced and impacted right femoral neck fracture. [JR]   1657 Discussed with Dr. Land, [facility], who agrees to admit. [JR]      ED Course User Index  [JR] Ramon Baptiste MD               DIAGNOSIS  Final diagnoses:   Closed displaced fracture of right femoral neck   Chronic kidney disease, unspecified CKD stage   Chronic anticoagulation         DISPOSITION  Admit            Latest Documented Vital Signs:  As of 17:06 EST  BP- (!) 188/65 HR- 62 Temp- 97.2 °F (36.2 °C) (Tympanic) O2 sat- 98%              --    Please note that portions of this were completed with a voice recognition program.       Note Disclaimer: At [facility], we believe that sharing information builds trust and better relationships. You are receiving this note because you are receiving care at [facility] or recently visited. It is possible you will see health information before a provider has talked with you about it. This kind of  information can be easy to misunderstand. To help you fully understand what it means for your health, we urge you to discuss this note with your provider.             Ramon Baptiste MD  12/28/23 9793

## 2023-12-29 ENCOUNTER — APPOINTMENT (OUTPATIENT)
Dept: CT IMAGING | Facility: HOSPITAL | Age: 78
End: 2023-12-29
Payer: MEDICARE

## 2023-12-29 ENCOUNTER — APPOINTMENT (OUTPATIENT)
Dept: CARDIOLOGY | Facility: HOSPITAL | Age: 78
End: 2023-12-29
Payer: MEDICARE

## 2023-12-29 PROBLEM — I48.91 ATRIAL FIBRILLATION: Status: ACTIVE | Noted: 2023-12-29

## 2023-12-29 PROBLEM — N18.9 CKD (CHRONIC KIDNEY DISEASE): Status: ACTIVE | Noted: 2023-12-29

## 2023-12-29 PROBLEM — I10 HTN (HYPERTENSION): Status: ACTIVE | Noted: 2023-12-29

## 2023-12-29 PROBLEM — E11.9 TYPE 2 DIABETES MELLITUS: Status: ACTIVE | Noted: 2023-12-29

## 2023-12-29 PROBLEM — I50.9 CHF (CONGESTIVE HEART FAILURE): Status: ACTIVE | Noted: 2023-12-29

## 2023-12-29 PROBLEM — D64.9 ANEMIA: Status: ACTIVE | Noted: 2023-12-29

## 2023-12-29 PROBLEM — I73.9 PVD (PERIPHERAL VASCULAR DISEASE): Status: ACTIVE | Noted: 2023-12-29

## 2023-12-29 LAB
ABO GROUP BLD: NORMAL
ANION GAP SERPL CALCULATED.3IONS-SCNC: 8.4 MMOL/L (ref 5–15)
AORTIC ARCH: 2.2 CM
AORTIC DIMENSIONLESS INDEX: 0.8 (DI)
ASCENDING AORTA: 3.2 CM
BH CV ECHO MEAS - AI P1/2T: 662.4 MSEC
BH CV ECHO MEAS - AO MAX PG: 12.8 MMHG
BH CV ECHO MEAS - AO MEAN PG: 5 MMHG
BH CV ECHO MEAS - AO V2 MAX: 179 CM/SEC
BH CV ECHO MEAS - AO V2 VTI: 32.1 CM
BH CV ECHO MEAS - AVA(I,D): 2.31 CM2
BH CV ECHO MEAS - EDV(CUBED): 121.9 ML
BH CV ECHO MEAS - EDV(MOD-SP2): 94 ML
BH CV ECHO MEAS - EDV(MOD-SP4): 199 ML
BH CV ECHO MEAS - EF(MOD-BP): 66.2 %
BH CV ECHO MEAS - EF(MOD-SP2): 60.6 %
BH CV ECHO MEAS - EF(MOD-SP4): 71.9 %
BH CV ECHO MEAS - ESV(CUBED): 47.8 ML
BH CV ECHO MEAS - ESV(MOD-SP2): 37 ML
BH CV ECHO MEAS - ESV(MOD-SP4): 56 ML
BH CV ECHO MEAS - FS: 26.8 %
BH CV ECHO MEAS - IVS/LVPW: 0.97 CM
BH CV ECHO MEAS - IVSD: 1.17 CM
BH CV ECHO MEAS - LAT PEAK E' VEL: 6.6 CM/SEC
BH CV ECHO MEAS - LV DIASTOLIC VOL/BSA (35-75): 100.1 CM2
BH CV ECHO MEAS - LV MASS(C)D: 227.5 GRAMS
BH CV ECHO MEAS - LV MAX PG: 6 MMHG
BH CV ECHO MEAS - LV MEAN PG: 3 MMHG
BH CV ECHO MEAS - LV SYSTOLIC VOL/BSA (12-30): 28.2 CM2
BH CV ECHO MEAS - LV V1 MAX: 122 CM/SEC
BH CV ECHO MEAS - LV V1 VTI: 24.9 CM
BH CV ECHO MEAS - LVIDD: 5 CM
BH CV ECHO MEAS - LVIDS: 3.6 CM
BH CV ECHO MEAS - LVOT AREA: 3 CM2
BH CV ECHO MEAS - LVOT DIAM: 1.95 CM
BH CV ECHO MEAS - LVPWD: 1.2 CM
BH CV ECHO MEAS - MED PEAK E' VEL: 4.8 CM/SEC
BH CV ECHO MEAS - MR MAX PG: 112.6 MMHG
BH CV ECHO MEAS - MR MAX VEL: 530.6 CM/SEC
BH CV ECHO MEAS - MV A MAX VEL: 122 CM/SEC
BH CV ECHO MEAS - MV DEC SLOPE: 303.6 CM/SEC2
BH CV ECHO MEAS - MV DEC TIME: 0.25 SEC
BH CV ECHO MEAS - MV E MAX VEL: 92.5 CM/SEC
BH CV ECHO MEAS - MV E/A: 0.76
BH CV ECHO MEAS - MV MAX PG: 7.1 MMHG
BH CV ECHO MEAS - MV MEAN PG: 2.6 MMHG
BH CV ECHO MEAS - MV P1/2T: 119.2 MSEC
BH CV ECHO MEAS - MV V2 VTI: 54.5 CM
BH CV ECHO MEAS - MVA(P1/2T): 1.85 CM2
BH CV ECHO MEAS - MVA(VTI): 1.36 CM2
BH CV ECHO MEAS - PA ACC TIME: 0.11 SEC
BH CV ECHO MEAS - PI END-D VEL: 117.4 CM/SEC
BH CV ECHO MEAS - RAP SYSTOLE: 3 MMHG
BH CV ECHO MEAS - RVSP: 38.3 MMHG
BH CV ECHO MEAS - SI(MOD-SP2): 28.7 ML/M2
BH CV ECHO MEAS - SI(MOD-SP4): 71.9 ML/M2
BH CV ECHO MEAS - SUP REN AO DIAM: 1.8 CM
BH CV ECHO MEAS - SV(LVOT): 74 ML
BH CV ECHO MEAS - SV(MOD-SP2): 57 ML
BH CV ECHO MEAS - SV(MOD-SP4): 143 ML
BH CV ECHO MEAS - TAPSE (>1.6): 2.14 CM
BH CV ECHO MEAS - TR MAX PG: 35.3 MMHG
BH CV ECHO MEAS - TR MAX VEL: 297.3 CM/SEC
BH CV ECHO MEASUREMENTS AVERAGE E/E' RATIO: 16.23
BH CV XLRA - RV BASE: 4 CM
BH CV XLRA - RV LENGTH: 8.6 CM
BH CV XLRA - RV MID: 2.9 CM
BH CV XLRA - TDI S': 16.3 CM/SEC
BLD GP AB SCN SERPL QL: NEGATIVE
BUN SERPL-MCNC: 22 MG/DL (ref 8–23)
BUN/CREAT SERPL: 9.8 (ref 7–25)
CALCIUM SPEC-SCNC: 8.1 MG/DL (ref 8.6–10.5)
CHLORIDE SERPL-SCNC: 106 MMOL/L (ref 98–107)
CO2 SERPL-SCNC: 24.6 MMOL/L (ref 22–29)
CREAT SERPL-MCNC: 2.25 MG/DL (ref 0.76–1.27)
DEPRECATED RDW RBC AUTO: 48.5 FL (ref 37–54)
EGFRCR SERPLBLD CKD-EPI 2021: 29.1 ML/MIN/1.73
ERYTHROCYTE [DISTWIDTH] IN BLOOD BY AUTOMATED COUNT: 14.7 % (ref 12.3–15.4)
GLUCOSE BLDC GLUCOMTR-MCNC: 133 MG/DL (ref 70–130)
GLUCOSE BLDC GLUCOMTR-MCNC: 194 MG/DL (ref 70–130)
GLUCOSE BLDC GLUCOMTR-MCNC: 72 MG/DL (ref 70–130)
GLUCOSE SERPL-MCNC: 137 MG/DL (ref 65–99)
HCT VFR BLD AUTO: 34.1 % (ref 37.5–51)
HGB BLD-MCNC: 11.3 G/DL (ref 13–17.7)
INR PPP: 2.14 (ref 0.9–1.1)
LEFT ATRIUM VOLUME INDEX: 24.2 ML/M2
MCH RBC QN AUTO: 30 PG (ref 26.6–33)
MCHC RBC AUTO-ENTMCNC: 33.1 G/DL (ref 31.5–35.7)
MCV RBC AUTO: 90.5 FL (ref 79–97)
PLATELET # BLD AUTO: 239 10*3/MM3 (ref 140–450)
PMV BLD AUTO: 9.9 FL (ref 6–12)
POTASSIUM SERPL-SCNC: 4.4 MMOL/L (ref 3.5–5.2)
PROTHROMBIN TIME: 24.3 SECONDS (ref 11.7–14.2)
QT INTERVAL: 394 MS
QTC INTERVAL: 391 MS
RBC # BLD AUTO: 3.77 10*6/MM3 (ref 4.14–5.8)
RH BLD: POSITIVE
SINUS: 3.5 CM
SODIUM SERPL-SCNC: 139 MMOL/L (ref 136–145)
STJ: 2.7 CM
T&S EXPIRATION DATE: NORMAL
WBC NRBC COR # BLD AUTO: 8.22 10*3/MM3 (ref 3.4–10.8)

## 2023-12-29 PROCEDURE — 93010 ELECTROCARDIOGRAM REPORT: CPT | Performed by: INTERNAL MEDICINE

## 2023-12-29 PROCEDURE — 25810000003 SODIUM CHLORIDE 0.9 % SOLUTION: Performed by: INTERNAL MEDICINE

## 2023-12-29 PROCEDURE — 86850 RBC ANTIBODY SCREEN: CPT | Performed by: ORTHOPAEDIC SURGERY

## 2023-12-29 PROCEDURE — 63710000001 INSULIN GLARGINE PER 5 UNITS: Performed by: STUDENT IN AN ORGANIZED HEALTH CARE EDUCATION/TRAINING PROGRAM

## 2023-12-29 PROCEDURE — 86901 BLOOD TYPING SEROLOGIC RH(D): CPT | Performed by: ORTHOPAEDIC SURGERY

## 2023-12-29 PROCEDURE — 93005 ELECTROCARDIOGRAM TRACING: CPT | Performed by: STUDENT IN AN ORGANIZED HEALTH CARE EDUCATION/TRAINING PROGRAM

## 2023-12-29 PROCEDURE — 82948 REAGENT STRIP/BLOOD GLUCOSE: CPT

## 2023-12-29 PROCEDURE — 93306 TTE W/DOPPLER COMPLETE: CPT | Performed by: INTERNAL MEDICINE

## 2023-12-29 PROCEDURE — 25010000002 HYDROMORPHONE 1 MG/ML SOLUTION: Performed by: NURSE PRACTITIONER

## 2023-12-29 PROCEDURE — 86900 BLOOD TYPING SEROLOGIC ABO: CPT | Performed by: ORTHOPAEDIC SURGERY

## 2023-12-29 PROCEDURE — 93306 TTE W/DOPPLER COMPLETE: CPT

## 2023-12-29 PROCEDURE — 80048 BASIC METABOLIC PNL TOTAL CA: CPT | Performed by: INTERNAL MEDICINE

## 2023-12-29 PROCEDURE — 73700 CT LOWER EXTREMITY W/O DYE: CPT

## 2023-12-29 PROCEDURE — 85610 PROTHROMBIN TIME: CPT | Performed by: ORTHOPAEDIC SURGERY

## 2023-12-29 PROCEDURE — 85027 COMPLETE CBC AUTOMATED: CPT | Performed by: INTERNAL MEDICINE

## 2023-12-29 RX ORDER — PHYTONADIONE 5 MG/1
5 TABLET ORAL ONCE
Status: COMPLETED | OUTPATIENT
Start: 2023-12-29 | End: 2023-12-29

## 2023-12-29 RX ADMIN — PANTOPRAZOLE SODIUM 40 MG: 40 TABLET, DELAYED RELEASE ORAL at 08:32

## 2023-12-29 RX ADMIN — ATENOLOL 100 MG: 50 TABLET ORAL at 08:32

## 2023-12-29 RX ADMIN — FUROSEMIDE 40 MG: 40 TABLET ORAL at 08:31

## 2023-12-29 RX ADMIN — HYDROMORPHONE HYDROCHLORIDE 1 MG: 1 INJECTION, SOLUTION INTRAMUSCULAR; INTRAVENOUS; SUBCUTANEOUS at 11:07

## 2023-12-29 RX ADMIN — GABAPENTIN 600 MG: 300 CAPSULE ORAL at 20:40

## 2023-12-29 RX ADMIN — GABAPENTIN 600 MG: 300 CAPSULE ORAL at 08:32

## 2023-12-29 RX ADMIN — HYDROCODONE BITARTRATE AND ACETAMINOPHEN 1 TABLET: 5; 325 TABLET ORAL at 08:51

## 2023-12-29 RX ADMIN — LOSARTAN POTASSIUM 100 MG: 100 TABLET, FILM COATED ORAL at 08:31

## 2023-12-29 RX ADMIN — HYDROCODONE BITARTRATE AND ACETAMINOPHEN 1 TABLET: 5; 325 TABLET ORAL at 15:18

## 2023-12-29 RX ADMIN — INSULIN GLARGINE 10 UNITS: 100 INJECTION, SOLUTION SUBCUTANEOUS at 20:41

## 2023-12-29 RX ADMIN — Medication 1 TABLET: at 08:31

## 2023-12-29 RX ADMIN — SENNOSIDES AND DOCUSATE SODIUM 2 TABLET: 50; 8.6 TABLET ORAL at 20:41

## 2023-12-29 RX ADMIN — AMLODIPINE BESYLATE 5 MG: 5 TABLET ORAL at 08:31

## 2023-12-29 RX ADMIN — SODIUM CHLORIDE 75 ML/HR: 9 INJECTION, SOLUTION INTRAVENOUS at 11:07

## 2023-12-29 RX ADMIN — ATORVASTATIN CALCIUM 80 MG: 80 TABLET, FILM COATED ORAL at 08:31

## 2023-12-29 RX ADMIN — HYDROCODONE BITARTRATE AND ACETAMINOPHEN 1 TABLET: 5; 325 TABLET ORAL at 20:40

## 2023-12-29 RX ADMIN — PHYTONADIONE 5 MG: 5 TABLET ORAL at 17:51

## 2023-12-29 NOTE — CONSULTS
Humble Rios   78 y.o.  male    LOS: 1 day   Patient Care Team:  Dereck Miramontes MD as PCP - General  Dereck Miramontes MD as PCP - Family Medicine      Subjective     Chief Complaint: ATSF     History of Present Illness:  Mr. Rios is a 78-year-old male who follows with Dr. Darron Agudelo in the office, past medical history detailed as below.  He presented to the emergency room after a fall, he was trying to climb a ladder to his attic and he fell onto his right hip and has severe pain. 3 view hip xr reveals acute complete transcervical right femoral neck fracture with minimal widening across the fracture line. There is no dislocation. CT Pelvis-Acute right femoral neck fracture with impaction and varus angulation. He does not have any chest pains no soa.    For the past month, he has a new awareness of central chest pressure associated with exertion. Walking room to room will commonly provoke this feeling. It often has mild associated dyspnea. Discomfort always resolves promptly with rest.      PMH  Freq PVC'S -- Holter June 2023 frequent asymptomatic premature ventricular contractions (16% burden)   PAF ([id number] score 6)   CKD  DM II  CAD Diagnostic cardiac catheterization, which was performed on September 13, 2018 demonstrated minimal three-vessel disease   HTN  HLD  Status post TIA in May of 2010     Transthoracic echocardiography  September 15, 2021 demonstrated normal left ventricular size and systolic function, with an estimated ejection fraction of 60 to 65%. There was mild to moderate mitral regurgitation, and mild aortic regurgitation. The findings were unchanged when compared to 2018     Echo 12/29/23    Left ventricular systolic function is normal. Calculated left ventricular EF = 66.2%    Left ventricular wall thickness is consistent with mild concentric hypertrophy.    Left ventricular diastolic function is consistent with (grade Ia w/high LAP) impaired relaxation.    Estimated right  ventricular systolic pressure from tricuspid regurgitation is mildly elevated (35-45 mmHg). Calculated right ventricular systolic pressure from tricuspid regurgitation is 38 mmHg.    Past Medical History:   Diagnosis Date    Abrasion     BACK OF LEFT LEG, SCABBED OVER, HEALING    Anesthesia complication     WIFE STATES HE NEEDS CHILD ET TUBE DUE TO A CURVATURE OF HIS TRACHEA    Arthritis     Asthma     Atrial fibrillation     Bladder cancer     COPD (chronic obstructive pulmonary disease)     Diabetes mellitus     Diverticulitis     Enlarged prostate     Fatty liver     GERD (gastroesophageal reflux disease)     Hard to intubate     Hyperlipidemia     Hypertension     Pneumonia     Seasonal allergies     Skin cancer     Sleep apnea     NO CPAP MACHINE    Stage 4 chronic kidney disease     TIA (transient ischemic attack) 2018     Past Surgical History:   Procedure Laterality Date    CARDIAC CATHETERIZATION      CERVICAL FUSION ANTERIOR WITH ARTIFICIAL DISCECTOMY IMPLANTATION      CHOLECYSTECTOMY      COLONOSCOPY      CYSTOSCOPY BLADDER BIOPSY      CYSTOSCOPY BLADDER BIOPSY Bilateral 1/6/2023    Procedure: CYSTOSCOPY BILATERAL RETROGRADE PYELOGRAM AND BLADDER BIOPSY;  Surgeon: Kong Maurer MD;  Location: [facility];  Service: Urology;  Laterality: Bilateral;    HEMORRHOIDECTOMY      X 2    HIP SURGERY Right     X 2 FOR BURSA REMOVAL    INGUINAL HERNIA REPAIR Right     LUMBAR DECOMPRESSION      LUMBAR LAMINECTOMY      TOE AMPUTATION Left     GREAT TOE     Medications Prior to Admission   Medication Sig Dispense Refill Last Dose    amLODIPine (NORVASC) 5 MG tablet Take 1 tablet by mouth Daily.       atenolol (TENORMIN) 100 MG tablet Take 1 tablet by mouth Daily.       atorvastatin (LIPITOR) 80 MG tablet Take 1 tablet by mouth Daily.       clopidogrel (PLAVIX) 75 MG tablet Take 1 tablet by mouth Daily.       cyclobenzaprine (FLEXERIL) 10 MG tablet Take 1 tablet by mouth 3 (Three) Times a Day As Needed for  Muscle Spasms.       docusate sodium 100 MG capsule Take 1 capsule by mouth As Needed.       furosemide (LASIX) 40 MG tablet Take 1 tablet by mouth Daily As Needed.       gabapentin (NEURONTIN) 300 MG capsule Take 2 capsules by mouth 2 (Two) Times a Day.       glimepiride (AMARYL) 2 MG tablet Take 1 tablet by mouth Every Night.       glimepiride (AMARYL) 4 MG tablet Take 1 tablet by mouth Every Morning.       Insulin Aspart 100 UNIT/ML solution cartridge Inject  under the skin into the appropriate area as directed See Admin Instructions. Sliding Scale       Insulin Degludec (TRESIBA) 100 UNIT/ML solution injection Inject 14 Units under the skin into the appropriate area as directed Every Night.       ipratropium (ATROVENT) 0.06 % nasal spray 2 sprays into the nostril(s) as directed by provider 4 (Four) Times a Day.       losartan (COZAAR) 100 MG tablet Take 1 tablet by mouth Daily.       multivitamin with minerals tablet tablet Take 1 tablet by mouth Daily.       pantoprazole (PROTONIX) 40 MG EC tablet Take 1 tablet by mouth Daily.       prednisoLONE acetate (PRED FORTE) 1 % ophthalmic suspension Administer 1 drop to both eyes Daily.       tamsulosin (FLOMAX) 0.4 MG capsule 24 hr capsule Take 1 capsule by mouth 2 (Two) Times a Day.       triamcinolone (KENALOG) 0.1 % cream Apply 1 application  topically to the appropriate area as directed 2 (Two) Times a Day As Needed.       warfarin (COUMADIN) 3 MG tablet Take 1 tablet by mouth Every Night.       albuterol sulfate  (90 Base) MCG/ACT inhaler Inhale 2 puffs As Needed.       carvedilol (COREG) 12.5 MG tablet TAKE 1 TABLET BY MOUTH IN THE MORNING AND 1 IN THE EVENING WITH MEALS       cephalexin (Keflex) 500 MG capsule Take 1 capsule by mouth 2 (Two) Times a Day. 10 capsule 0     furosemide (LASIX) 20 MG tablet Take 2 tablets by mouth Daily.       glucose blood test strip Use to test glucose 3 times per day. DX: E11.9 and Z79.4.       guaiFENesin (MUCINEX) 600 MG  12 hr tablet Take 2 tablets by mouth Daily.       HYDROcodone-acetaminophen (Norco) 5-325 MG per tablet Take 1 tablet by mouth Every 6 (Six) Hours As Needed for Moderate Pain. 20 tablet 0     ketorolac (ACULAR) 0.5 % ophthalmic solution INSTILL 1 DROP THREE TIMES DAILY INTO LEFT EYE FOR 14 DAYS FOLLOWING SURGERY THEN STOP       magnesium oxide (MAG-OX) 400 MG tablet Take 1 tablet by mouth 2 (Two) Times a Day.       moxifloxacin (VIGAMOX) 0.5 % ophthalmic solution INSTILL 1 DROP INTO LEFT EYE THREE TIMES DAILY FOR 3 DAYS BEFORE SURGERY THEN USE EVERY HOUR WHILE AWAKE DAY OF SURGERY, THEN USE FOUR TIMES DAILY FOR 7 DAYS AFTER SURGERY THEN STOP       nicotine (NICODERM CQ) 7 MG/24HR patch Place 1 patch on the skin as directed by provider Daily.       promethazine-dextromethorphan (PROMETHAZINE-DM) 6.25-15 MG/5ML syrup Take 5 mL by mouth As Needed.          Family History   Problem Relation Age of Onset    Malig Hyperthermia Neg Hx      Social History     Socioeconomic History    Marital status:    Tobacco Use    Smoking status: Former     Packs/day: 0.25     Years: 70.00     Additional pack years: 0.00     Total pack years: 17.50     Types: Cigarettes     Quit date: 2022     Years since quittin.0    Smokeless tobacco: Never    Tobacco comments:     USING NICOTINE PATCHES DAILY   Vaping Use    Vaping Use: Never used   Substance and Sexual Activity    Alcohol use: Yes     Comment: OCCASSIONAL    Drug use: Never    Sexual activity: Defer     Objective       Review of Systems:   Constitutional: Negative for diaphoresis, fatigue, fever and unexpected weight change.   HENT: Negative.    Eyes: Negative.    Respiratory: Negative for cough, shortness of breath and wheezing.    Cardiovascular: Negative for chest pain, palpitations and leg swelling.   Gastrointestinal: Negative for abdominal pain, blood in stool, constipation, diarrhea, nausea and vomiting.   Endocrine: Negative.    Genitourinary: Negative for  difficulty urinating, dysuria and frequency.   Musculoskeletal: Negative.    Skin: Negative.    Allergic/Immunologic: Negative for environmental allergies and food allergies.   Neurological: Negative.    Hematological: Negative.    Psychiatric/Behavioral: Negative.        Current Facility-Administered Medications:     acetaminophen (TYLENOL) tablet 650 mg, 650 mg, Oral, Q4H PRN, Anabell Land MD    albuterol (PROVENTIL) nebulizer solution 0.083% 2.5 mg/3mL, 2.5 mg, Nebulization, Q6H PRN, Anabell Land MD    amLODIPine (NORVASC) tablet 5 mg, 5 mg, Oral, Daily, Anabell Land MD, 5 mg at 12/29/23 0831    atenolol (TENORMIN) tablet 100 mg, 100 mg, Oral, Daily, Anabell Land MD, 100 mg at 12/29/23 0832    atorvastatin (LIPITOR) tablet 80 mg, 80 mg, Oral, Daily, Anabell Land MD, 80 mg at 12/29/23 0831    sennosides-docusate (PERICOLACE) 8.6-50 MG per tablet 2 tablet, 2 tablet, Oral, BID **AND** polyethylene glycol (MIRALAX) packet 17 g, 17 g, Oral, Daily PRN **AND** bisacodyl (DULCOLAX) EC tablet 5 mg, 5 mg, Oral, Daily PRN **AND** bisacodyl (DULCOLAX) suppository 10 mg, 10 mg, Rectal, Daily PRN, Anabell Land MD    cyclobenzaprine (FLEXERIL) tablet 10 mg, 10 mg, Oral, TID PRN, Anabell Land MD, 10 mg at 12/28/23 2113    diphenhydrAMINE (BENADRYL) capsule 25 mg, 25 mg, Oral, Q6H PRN, Annamaria Davis APRN    furosemide (LASIX) tablet 40 mg, 40 mg, Oral, Daily PRN, Anabell Land MD    gabapentin (NEURONTIN) capsule 600 mg, 600 mg, Oral, BID, Anabell Land MD, 600 mg at 12/29/23 0832    HYDROcodone-acetaminophen (NORCO) 5-325 MG per tablet 1 tablet, 1 tablet, Oral, Q4H PRN, Anabell Land MD, 1 tablet at 12/29/23 0851    HYDROmorphone (DILAUDID) injection 1 mg, 1 mg, Intravenous, Q2H PRN, 1 mg at 12/29/23 1107 **AND** naloxone (NARCAN) injection 0.4 mg, 0.4 mg, Intravenous, Q5 Min PRN, Annamaria Davis, APRN    insulin glargine  (LANTUS, SEMGLEE) injection 10 Units, 10 Units, Subcutaneous, Nightly, Jovita Granado MD    losartan (COZAAR) tablet 100 mg, 100 mg, Oral, Daily, Anabell Land MD, 100 mg at 12/29/23 0831    melatonin tablet 3 mg, 3 mg, Oral, Nightly PRN, Anabell Land MD    multivitamin with minerals 1 tablet, 1 tablet, Oral, Daily, Anabell Land MD, 1 tablet at 12/29/23 0831    ondansetron ODT (ZOFRAN-ODT) disintegrating tablet 4 mg, 4 mg, Oral, Q6H PRN, 4 mg at 12/28/23 2145 **OR** ondansetron (ZOFRAN) injection 4 mg, 4 mg, Intravenous, Q6H PRN, Anabell Land MD    pantoprazole (PROTONIX) EC tablet 40 mg, 40 mg, Oral, QAM AC, Anabell Land MD, 40 mg at 12/29/23 0832    Insert Peripheral IV, , , Once **AND** sodium chloride 0.9 % flush 10 mL, 10 mL, Intravenous, PRN, EmileRamon MD    sodium chloride 0.9 % infusion, 75 mL/hr, Intravenous, Continuous, Anabell Land MD, Last Rate: 75 mL/hr at 12/29/23 1107, 75 mL/hr at 12/29/23 1107    tamsulosin (FLOMAX) 24 hr capsule 0.4 mg, 0.4 mg, Oral, BID, Anabell Land MD      Physical Exam:   Vital Sign Min/Max for last 24 hours  Temp  Min: 97.2 °F (36.2 °C)  Max: 98.7 °F (37.1 °C)   BP  Min: 112/58  Max: 188/65    Pulse  Min: 62  Max: 68     Wt Readings from Last 3 Encounters:   12/29/23 78.9 kg (174 lb)   12/30/22 81.2 kg (179 lb)   12/06/22 79.4 kg (175 lb)       General Appearance:  Awake,  Alert, cooperative, male in no acute distress   Head:  Normocephalic, without obvious abnormality, atraumatic   Eyes:          Conjunctivae normal, anicteric, eom intact    Neck: No adenopathy, supple, trachea midline, no thyromegaly, no   carotid bruit, no JVD, no elevated cvp   Lungs:   Clear to auscultation,respirations regular, even and                  unlabored    Heart:  Regular rhythm and normal rate, normal S1 and S2,            No murmur, no gallop, no rub, no click    Chest Wall:  No abnormalities observed   Abdomen:  "  Normal bowel sounds, no masses, soft nontender, nondistended                    Rectal:   Deferred   Extremities: No edema. Moves all extremities well, no cyanosis, no erythema   Pulses: Pulses palpable and equal bilaterally   Skin: No bleeding, bruising or rash   Neurologic: Speech clear and appropriate, no facial drooping     : voids      MONITOR: nsr    Results Review:     Sodium Sodium   Date Value Ref Range Status   12/29/2023 139 136 - 145 mmol/L Final   12/28/2023 141 136 - 145 mmol/L Final      Potassium Potassium   Date Value Ref Range Status   12/29/2023 4.4 3.5 - 5.2 mmol/L Final   12/28/2023 4.6 3.5 - 5.2 mmol/L Final      Chloride Chloride   Date Value Ref Range Status   12/29/2023 106 98 - 107 mmol/L Final   12/28/2023 107 98 - 107 mmol/L Final      Bicarbonate No results found for: \"PLASMABICARB\"   BUN BUN   Date Value Ref Range Status   12/29/2023 22 8 - 23 mg/dL Final   12/28/2023 28 (H) 8 - 23 mg/dL Final      Creatinine Creatinine   Date Value Ref Range Status   12/29/2023 2.25 (H) 0.76 - 1.27 mg/dL Final   12/28/2023 2.45 (H) 0.76 - 1.27 mg/dL Final      Calcium Calcium   Date Value Ref Range Status   12/29/2023 8.1 (L) 8.6 - 10.5 mg/dL Final   12/28/2023 8.4 (L) 8.6 - 10.5 mg/dL Final      Magnesium No results found for: \"MG\"     Results from last 7 days   Lab Units 12/29/23  0522   WBC 10*3/mm3 8.22   HEMOGLOBIN g/dL 11.3*   HEMATOCRIT % 34.1*   PLATELETS 10*3/mm3 239     No results found for: \"TROPONINT\"  No results found for: \"CHOL\"  No results found for: \"HDL\"  No results found for: \"LDL\"  No results found for: \"TRIG\"  No components found for: \"CHOLHDL\"  No results found for: \"PTT\"  No components found for: \"PT/INR\"  No results found for: \"HGBA1C\"   No results found for: \"TSH\"     ECG: sinus, old inferior mi      Assessment/ Plan    Active Hospital Problems    Diagnosis  POA    **Closed displaced fracture of right femoral neck [S72.001A]  Yes     Priority: Low    CKD (chronic kidney " disease) [N18.9]  Unknown     Priority: Low    Atrial fibrillation [I48.91]  Unknown     Priority: Low    HTN (hypertension) [I10]  Unknown     Priority: Low    CHF (congestive heart failure) [I50.9]  Unknown     Priority: Low    Anemia [D64.9]  Unknown     Priority: Low    Type 2 diabetes mellitus [E11.9]  Unknown     Priority: Low    PVD (peripheral vascular disease) [I73.9]  Unknown     Priority: Low     Assessment: There is a new complaint of chest pressure provoked by moderate exertion. Risk for cardiac complication of general anesthesia is high on this basis. He describes unstable angina pectoris.    Closed displaced fracture of right femoral neck   CT Pelvis-Acute right femoral neck fracture with impaction and varus angulation  Planning percutaneous pinning versus right bipolar hemiarthroplasty per ortho note    2. Freq PVC'S -- Holter June 2023 frequent asymptomatic premature ventricular contractions (16% burden)   3. PAF ([id number] score 6) on chr a.c.  NSR  Transthoracic echocardiography  September 15, 2021 demonstrated normal left ventricular size and systolic function, with an estimated ejection fraction of 60 to 65%. There was mild to moderate mitral regurgitation, and mild aortic regurgitation. The findings were unchanged when compared to 2018       4. CKD  5. DM II  6 CAD   Diagnostic cardiac catheterization, which was performed on September 13, 2018 demonstrated minimal three-vessel disease   7. HTN  8. HLD  9. PVD- plavix    Plan  This is an elderly male with one month of new chest pressure with mild to moderate exertion. I shall cancel surgery and make plan for cardiac cath. If there is proximal LAD or left main stenosis, intervention will be appropriate. If there is less ominous disease, we need not perform intervention and can proceed to orthopedic surgery.  Vitamin K already given. I shall cancel [facility]FP.    Cecilia De La Cruz, APRN  12/29/23  11:51 EST    Discussed with dr san  Time: 60 mins  with chart review

## 2023-12-29 NOTE — CASE MANAGEMENT/SOCIAL WORK
Discharge Planning Assessment  [facility]     Patient Name: Humble Rios  MRN: [id number]  Today's Date: 12/29/2023    Admit Date: 12/28/2023    Plan: Home with [facility] (pending acceptance) v/s rehab at [facility]   Discharge Needs Assessment       Row Name 12/29/23 1241       Living Environment    People in Home spouse    Name(s) of People in Home wife: Pat    Current Living Arrangements home    Potentially Unsafe Housing Conditions none    Provides Primary Care For no one    Family Caregiver if Needed spouse    Family Caregiver Names wife: Pat    Quality of Family Relationships supportive    Able to Return to Prior Arrangements other (see comments)  to be determined after surgery       Resource/Environmental Concerns    Transportation Concerns none       Transition Planning    Patient/Family Anticipates Transition to home with family;inpatient rehabilitation facility    Patient/Family Anticipated Services at Transition home health care;skilled nursing    Transportation Anticipated other (see comments)  to be determined after surgery       Discharge Needs Assessment    Equipment Currently Used at Home none    Concerns to be Addressed adjustment to diagnosis/illness;basic needs;decision-making;discharge planning    Anticipated Changes Related to Illness inability to care for self    Equipment Needed After Discharge walker, rolling                   Discharge Plan       Row Name 12/29/23 1233       Plan    Plan Home with [facility] (pending acceptance) v/s [facility]( [facility])    Patient/Family in Agreement with Plan yes    Provided Post Acute Provider List? Yes    Post Acute Provider List Home Health;Nursing Home    Provided Post Acute Provider Quality & Resource List? Yes    Post Acute Provider Quality and Resource List Nursing Home    Delivered To Support Person    Support Person Wife: Pat    Method of Delivery In person    Plan  Comments I met with pt and his wife Caty Rios, pt said I could speak with his wife.  Pat confirmed the address, PCP and [facility] are correct, she said she and her  live in a single story home with 1-2 steps to enter depending on the door, pt is IADL, can afford his Rx, she mosty provides transportation due to his vision problem, pt is IADL, uses no DME, has never had home health or been to a SNF. She said her  wants to return home when discharged she closed her eyes and picked out [facility] [place] in the event SNF if needed.   Hillary Rees RN                  Continued Care and Services - Admitted Since 12/28/2023       Destination       Service Provider Request Status Selected Services Address Phone Fax Patient Preferred    [facility] Pending - Request Sent N/A [address] [facility] [address] [phone] --              Home Medical Care       Service Provider Request Status Selected Services Address Phone Fax Patient Preferred    [facility] Considering N/A [address], [address], [facility] [address] [phone] --                     Demographic Summary       Row Name 12/29/23 1240       General Information    Admission Type inpatient    Arrived From home    Required Notices Provided Important Message from Medicare    Referral Source admission list    Reason for Consult discharge planning    Preferred Language English       Contact Information    Permission Granted to Share Info With family/designee                   Functional Status       Row Name 12/29/23 1240       Functional Status, IADL    Medications independent    Meal Preparation independent    Housekeeping independent    Laundry independent    Shopping assistive person  wife does most of the driving due to pt's visual problems             Hillary Rees, RN

## 2023-12-29 NOTE — H&P
HISTORY AND PHYSICAL   [facility]        Date of Admission: 2023  Patient Identification:  Name: Humble Rios  Age: 78 y.o.  Sex: male  :  1945  MRN: [id number]                     Primary Care Physician: Dereck Miramontes MD    Chief Complaint:  78 year old gentleman presented to the emergency room after he fell at home; he was trying to climb a ladder to his attic; he fell onto his right hip and is not not able to bear weight; he has severe pain of the right up since he fell; no fever or chills; no nausea or vomiting; denies syncope    History of Present Illness:   As above    Past Medical History:  Past Medical History:   Diagnosis Date    Abrasion     BACK OF LEFT LEG, SCABBED OVER, HEALING    Anesthesia complication     WIFE STATES HE NEEDS CHILD ET TUBE DUE TO A CURVATURE OF HIS TRACHEA    Arthritis     Asthma     Atrial fibrillation     Bladder cancer     COPD (chronic obstructive pulmonary disease)     Diabetes mellitus     Diverticulitis     Enlarged prostate     Fatty liver     GERD (gastroesophageal reflux disease)     Hard to intubate     Hyperlipidemia     Hypertension     Pneumonia     Seasonal allergies     Skin cancer     Sleep apnea     NO CPAP MACHINE    Stage 4 chronic kidney disease     TIA (transient ischemic attack)      Past Surgical History:  Past Surgical History:   Procedure Laterality Date    CARDIAC CATHETERIZATION      CERVICAL FUSION ANTERIOR WITH ARTIFICIAL DISCECTOMY IMPLANTATION      CHOLECYSTECTOMY      COLONOSCOPY      CYSTOSCOPY BLADDER BIOPSY      CYSTOSCOPY BLADDER BIOPSY Bilateral 2023    Procedure: CYSTOSCOPY BILATERAL RETROGRADE PYELOGRAM AND BLADDER BIOPSY;  Surgeon: Kong Maurer MD;  Location: [facility];  Service: Urology;  Laterality: Bilateral;    HEMORRHOIDECTOMY      X 2    HIP SURGERY Right     X 2 FOR BURSA REMOVAL    INGUINAL HERNIA REPAIR Right     LUMBAR DECOMPRESSION      LUMBAR LAMINECTOMY      TOE  AMPUTATION Left     GREAT TOE      Home Meds:  Medications Prior to Admission   Medication Sig Dispense Refill Last Dose    amLODIPine (NORVASC) 5 MG tablet Take 1 tablet by mouth Daily.       atenolol (TENORMIN) 100 MG tablet Take 1 tablet by mouth Daily.       atorvastatin (LIPITOR) 80 MG tablet Take 1 tablet by mouth Daily.       clopidogrel (PLAVIX) 75 MG tablet Take 1 tablet by mouth Daily.       cyclobenzaprine (FLEXERIL) 10 MG tablet Take 1 tablet by mouth 3 (Three) Times a Day As Needed for Muscle Spasms.       docusate sodium 100 MG capsule Take 1 capsule by mouth As Needed.       furosemide (LASIX) 40 MG tablet Take 1 tablet by mouth Daily As Needed.       gabapentin (NEURONTIN) 300 MG capsule Take 2 capsules by mouth 2 (Two) Times a Day.       glimepiride (AMARYL) 2 MG tablet Take 1 tablet by mouth Every Night.       glimepiride (AMARYL) 4 MG tablet Take 1 tablet by mouth Every Morning.       Insulin Aspart 100 UNIT/ML solution cartridge Inject  under the skin into the appropriate area as directed See Admin Instructions. Sliding Scale       Insulin Degludec (TRESIBA) 100 UNIT/ML solution injection Inject 14 Units under the skin into the appropriate area as directed Every Night.       ipratropium (ATROVENT) 0.06 % nasal spray 2 sprays into the nostril(s) as directed by provider 4 (Four) Times a Day.       losartan (COZAAR) 100 MG tablet Take 1 tablet by mouth Daily.       multivitamin with minerals tablet tablet Take 1 tablet by mouth Daily.       pantoprazole (PROTONIX) 40 MG EC tablet Take 1 tablet by mouth Daily.       prednisoLONE acetate (PRED FORTE) 1 % ophthalmic suspension Administer 1 drop to both eyes Daily.       tamsulosin (FLOMAX) 0.4 MG capsule 24 hr capsule Take 1 capsule by mouth 2 (Two) Times a Day.       triamcinolone (KENALOG) 0.1 % cream Apply 1 application  topically to the appropriate area as directed 2 (Two) Times a Day As Needed.       warfarin (COUMADIN) 3 MG tablet Take 1 tablet  by mouth Every Night.       albuterol sulfate  (90 Base) MCG/ACT inhaler Inhale 2 puffs As Needed.       carvedilol (COREG) 12.5 MG tablet TAKE 1 TABLET BY MOUTH IN THE MORNING AND 1 IN THE EVENING WITH MEALS       cephalexin (Keflex) 500 MG capsule Take 1 capsule by mouth 2 (Two) Times a Day. 10 capsule 0     furosemide (LASIX) 20 MG tablet Take 2 tablets by mouth Daily.       glucose blood test strip Use to test glucose 3 times per day. DX: E11.9 and Z79.4.       guaiFENesin (MUCINEX) 600 MG 12 hr tablet Take 2 tablets by mouth Daily.       HYDROcodone-acetaminophen (Norco) 5-325 MG per tablet Take 1 tablet by mouth Every 6 (Six) Hours As Needed for Moderate Pain. 20 tablet 0     ketorolac (ACULAR) 0.5 % ophthalmic solution INSTILL 1 DROP THREE TIMES DAILY INTO LEFT EYE FOR 14 DAYS FOLLOWING SURGERY THEN STOP       magnesium oxide (MAG-OX) 400 MG tablet Take 1 tablet by mouth 2 (Two) Times a Day.       moxifloxacin (VIGAMOX) 0.5 % ophthalmic solution INSTILL 1 DROP INTO LEFT EYE THREE TIMES DAILY FOR 3 DAYS BEFORE SURGERY THEN USE EVERY HOUR WHILE AWAKE DAY OF SURGERY, THEN USE FOUR TIMES DAILY FOR 7 DAYS AFTER SURGERY THEN STOP       nicotine (NICODERM CQ) 7 MG/24HR patch Place 1 patch on the skin as directed by provider Daily.       promethazine-dextromethorphan (PROMETHAZINE-DM) 6.25-15 MG/5ML syrup Take 5 mL by mouth As Needed.          Allergies:  Allergies   Allergen Reactions    Hydrocodone-Acetaminophen Itching    Aspirin GI Intolerance and Other (See Comments)     STOMACH PAIN      Ibuprofen Nausea And Vomiting    Morphine Hives and Itching     Immunizations:  Immunization History   Administered Date(s) Administered    COVID-19 (MODERNA) 1st,2nd,3rd Dose Monovalent 02/20/2021, 03/18/2021    COVID-19 (MODERNA) Monovalent Original Booster 10/27/2021, 07/25/2022    COVID-19 F23 (MODERNA) 12YRS+ (SPIKEVAX) 11/27/2023     Social History:   Social History     Social History Narrative    Not on file      Social History     Socioeconomic History    Marital status:    Tobacco Use    Smoking status: Former     Packs/day: 0.25     Years: 70.00     Additional pack years: 0.00     Total pack years: 17.50     Types: Cigarettes     Quit date: 2022     Years since quittin.0    Smokeless tobacco: Never    Tobacco comments:     USING NICOTINE PATCHES DAILY   Vaping Use    Vaping Use: Never used   Substance and Sexual Activity    Alcohol use: Yes     Comment: OCCASSIONAL    Drug use: Never    Sexual activity: Defer       Family History:  Family History   Problem Relation Age of Onset    Malig Hyperthermia Neg Hx         Review of Systems  See history of present illness and past medical history.  Patient denies headache, dizziness, syncope, change in vision, change in hearing, change in taste, changes in weight, changes in appetite, focal weakness, numbness, or paresthesia.  Patient denies chest pain, palpitations, dyspnea, orthopnea, PND, cough, sinus pressure, rhinorrhea, epistaxis, hemoptysis, nausea, vomiting,hematemesis, diarrhea, constipation or hematochezia.  Denies cold or heat intolerance, polydipsia, polyuria, polyphagia. Denies hematuria, pyuria, dysuria, hesitancy, frequency or urgency. Denies consumption of raw and under cooked meats foods or change in water source.  Denies fever, chills, sweats, night sweats.      Objective:  T Max 24 hrs: Temp (24hrs), Av.2 °F (36.2 °C), Min:97.2 °F (36.2 °C), Max:97.2 °F (36.2 °C)    Vitals Ranges:   Temp:  [97.2 °F (36.2 °C)] 97.2 °F (36.2 °C)  Heart Rate:  [62] 62  Resp:  [20] 20  BP: (188)/(65) 188/65      Exam:  BP (!) 188/65   Pulse 62   Temp 97.2 °F (36.2 °C) (Tympanic)   Resp 20   SpO2 98%     General Appearance:    Alert, cooperative, no distress, appears stated age   Head:    Normocephalic, without obvious abnormality, atraumatic   Eyes:    PERRL, conjunctivae/corneas clear, EOM's intact, both eyes   Ears:    Normal external ear canals, both  ears   Nose:   Nares normal, septum midline, mucosa normal, no drainage    or sinus tenderness   Throat:   Lips, mucosa, and tongue normal   Neck:   Supple, symmetrical, trachea midline, no adenopathy;     thyroid:  no enlargement/tenderness/nodules; no carotid    bruit or JVD   Back:     Symmetric, no curvature, ROM normal, no CVA tenderness   Lungs:    Decreased breath sounds bilaterally, respirations unlabored   Chest Wall:    No tenderness or deformity    Heart:    Regular rate and rhythm, S1 and S2 normal, no murmur, rub   or gallop   Abdomen:     Soft, nontender, bowel sounds active all four quadrants,     no masses, no hepatomegaly, no splenomegaly   Extremities:   Extremities normal, atraumatic, no cyanosis or edema                       .    Data Review:  Labs in chart were reviewed.  WBC   Date Value Ref Range Status   12/28/2023 9.42 3.40 - 10.80 10*3/mm3 Final     Hemoglobin   Date Value Ref Range Status   12/28/2023 11.4 (L) 13.0 - 17.7 g/dL Final     Hematocrit   Date Value Ref Range Status   12/28/2023 34.6 (L) 37.5 - 51.0 % Final     Platelets   Date Value Ref Range Status   12/28/2023 269 140 - 450 10*3/mm3 Final     Sodium   Date Value Ref Range Status   12/28/2023 141 136 - 145 mmol/L Final     Potassium   Date Value Ref Range Status   12/28/2023 4.6 3.5 - 5.2 mmol/L Final     Chloride   Date Value Ref Range Status   12/28/2023 107 98 - 107 mmol/L Final     CO2   Date Value Ref Range Status   12/28/2023 25.0 22.0 - 29.0 mmol/L Final     BUN   Date Value Ref Range Status   12/28/2023 28 (H) 8 - 23 mg/dL Final     Creatinine   Date Value Ref Range Status   12/28/2023 2.45 (H) 0.76 - 1.27 mg/dL Final     Glucose   Date Value Ref Range Status   12/28/2023 146 (H) 65 - 99 mg/dL Final     Calcium   Date Value Ref Range Status   12/28/2023 8.4 (L) 8.6 - 10.5 mg/dL Final                Imaging Results (All)       Procedure Component Value Units Date/Time    XR Chest 1 View [[id number]] Collected: 12/28/23  1708     Updated: 12/28/23 1712    Narrative:      XR CHEST 1 VW-     HISTORY: Preop.     COMPARISON: None     FINDINGS:    3 views of the chest were obtained.     Support Devices:  None.  Cardiac Silhouette/Mediastinum/Nasreen: The cardiac silhouette is upper  normal in size. There is calcific aortic atherosclerosis.  Lungs/Pleural Spaces:  The lungs and pleural spaces are clear.  Chest Wall/Diaphragm/Upper Abdomen: There is incompletely assessed  fusion hardware in the lower cervical spine.        CONCLUSION(S):       1.  No focal consolidation or effusion.     This report was finalized on 12/28/2023 5:09 PM by Dr. Buffy Alanis M.D  on Workstation: [id number]       XR Hip With or Without Pelvis 2 - 3 View Right [[id number]] Collected: 12/28/23 1657     Updated: 12/28/23 1701    Narrative:      PROCEDURE:  XR HIP W OR WO PELVIS 2-3 VIEW RIGHT-     HISTORY: Fall from steps, right hip pain.     COMPARISON: None.     FINDINGS:       3 views of the pelvis and right hip were obtained.     There is an acute complete transcervical right femoral neck fracture  with minimal widening across the fracture line. There is no dislocation.  There is degenerative disc disease in the visualized lower lumbar spine.  There is calcific atherosclerosis.     This report was finalized on 12/28/2023 4:58 PM by Dr. Buffy Alanis M.D  on Workstation: [id number]                 Assessment:  Active Hospital Problems    Diagnosis  POA    **Closed displaced fracture of right femoral neck [S72.001A]  Yes      Resolved Hospital Problems   No resolved problems to display.   Fall  A fib  Copd  Diabetes  Hypertension  Anemia  Ckd4  Sleep apnea    Plan:  Ortho to see  Hold coumadin and plavix  Trend inr  Accu checks, insulin sliding scale  D w patient and ed provider    Anabell Land MD  12/28/2023  19:59 EST

## 2023-12-29 NOTE — PROGRESS NOTES
Name: Humble Rios ADMIT: 2023   : 1945  PCP: Dereck Miramontes MD    MRN: [id number] LOS: 1 days   AGE/SEX: 78 y.o. male  ROOM: 132/1     Subjective   Subjective   No acute events overnight.  Patient is pretty uncomfortable this morning.  Did get somewhat better control of pain overnight with addition of pain medications.  Possibly going to the OR this afternoon pending his INR.  He denies any chest pain or shortness of breath.    Objective   Objective     Vital Signs  Temp:  [97.2 °F (36.2 °C)-98.7 °F (37.1 °C)] 98.7 °F (37.1 °C)  Heart Rate:  [62-68] 64  Resp:  [18-20] 18  BP: (112-188)/(58-73) 156/66  SpO2:  [98 %-99 %] 99 %  on   ;   Device (Oxygen Therapy): simple face mask  Body mass index is 23.93 kg/m².    Physical Exam  General: Sleeping but arouses easily.  Uncomfortable appearing.  No acute distress.  ENT: No conjunctival injection or scleral icterus. Moist mucous membranes.   Neuro: Eyes open and moving in all directions, strength exam deferred due to injury, no focal deficits.   Lungs: Clear to auscultation bilaterally. No wheeze or crackles. No distress.   Heart: RRR, no murmurs.   Abdomen: Soft, non-tender, non-distended. Normal bowel sounds. No hepatosplenomegaly.   Ext: Right lower extremity held at ankle, ROM deferred due to injury and pain.  Skin: No rashes or lesions. IV site without swelling or erythema.     Results Review     I reviewed the patient's new clinical results:  Results from last 7 days   Lab Units 23  0522 23  1538   WBC 10*3/mm3 8.22 9.42   HEMOGLOBIN g/dL 11.3* 11.4*   PLATELETS 10*3/mm3 239 269     Results from last 7 days   Lab Units 23  0522 23  1538   SODIUM mmol/L 139 141   POTASSIUM mmol/L 4.4 4.6   CHLORIDE mmol/L 106 107   CO2 mmol/L 24.6 25.0   BUN mg/dL 22 28*   CREATININE mg/dL 2.25* 2.45*   GLUCOSE mg/dL 137* 146*   EGFR mL/min/1.73 29.1* 26.3*       Results from last 7 days   Lab Units 23  0522 23  153  "  CALCIUM mg/dL 8.1* 8.4*       No results found for: \"HGBA1C\", \"POCGLU\"    No radiology results for the last day    I have personally reviewed all medications:  Scheduled Medications  amLODIPine, 5 mg, Oral, Daily  atenolol, 100 mg, Oral, Daily  atorvastatin, 80 mg, Oral, Daily  gabapentin, 600 mg, Oral, BID  insulin glargine, 10 Units, Subcutaneous, Nightly  ipratropium, 2 spray, Each Nare, 4x Daily  losartan, 100 mg, Oral, Daily  multivitamin with minerals, 1 tablet, Oral, Daily  pantoprazole, 40 mg, Oral, QAM AC  senna-docusate sodium, 2 tablet, Oral, BID  tamsulosin, 0.4 mg, Oral, BID    Infusions  sodium chloride, 75 mL/hr, Last Rate: 75 mL/hr (12/28/23 2009)    Diet  NPO Diet NPO Type: Strict NPO    Assessment/Plan     Active Hospital Problems    Diagnosis  POA    **Closed displaced fracture of right femoral neck [S72.001A]  Yes    CKD (chronic kidney disease) [N18.9]  Unknown    Atrial fibrillation [I48.91]  Unknown    HTN (hypertension) [I10]  Unknown    CHF (congestive heart failure) [I50.9]  Unknown    Anemia [D64.9]  Unknown    Type 2 diabetes mellitus [E11.9]  Unknown    PVD (peripheral vascular disease) [I73.9]  Unknown      Resolved Hospital Problems   No resolved problems to display.       78 y.o. male with Closed displaced fracture of right femoral neck.    Right femoral neck fracture  -XR hip with acute transcervical right femoral neck fracture  -Orthopedic surgery consulted and following, appreciate their assistance  -CT lower extremity pending  -NPO for now  -Pending results of INR, will go for surgical repair this afternoon or tomorrow    CKD  -Creatinine 2.45 on arrival, improved to 2.25 this morning  -Seems consistent with patient's baseline  -Avoid nephrotoxic agents including NSAIDs and contrast dyes  -Monitor with daily BMP  -If creatinine worsens from baseline, will ask nephrology to evaluate    Atrial fibrillation  HTN  CHF  -Continue amlodipine, atenolol  -Difficult to ascertain METS, as " patient states he is limited at baseline due to chronic hip pain.  He states he typically does not have shortness of breath or chest pain with activity.  -Unable to see echo done at [facility], follows with [facility] cardiology  -Hold Lasix for now while n.p.o.  -Check EKG preoperatively  -Hold warfarin, trend INR  -Given extensive cardiac history, will ask [facility] cardiology to evaluate the patient preoperatively    Anemia  -Hemoglobin 11.4 on arrival, consistent with baseline  -No signs of active bleeding, but at risk with fracture  -Monitor with daily CBC, transfuse for Hgb less than 7    Type 2 diabetes mellitus  -Hold home oral agents  -Continue Lantus and SSI  -Titrate insulin doses as needed based on blood glucose levels    PVD  -Hold clopidogrel for now, restart postoperatively when okay from orthopedic surgery standpoint  -Confirmed Plavix is due to PVD, no cardiac stents placed previous    SCDs for DVT prophylaxis.  Full code.  Discussed with patient, family, and nursing staff.  Anticipate discharge TBD      Joviat Granado MD  [facility]  12/29/23  10:11 EST

## 2023-12-29 NOTE — DISCHARGE PLACEMENT REQUEST
"Humble Enriquez (78 y.o. Male)       Date of Birth   1945    Social Security Number       Address   [address] [place] [address]    Home Phone   [phone]    MRN   [id number]       Jain   None    Marital Status                               Admission Date   12/28/23    Admission Type   Emergency    Admitting Provider   Anabell Land MD    Attending Provider   Jovita Granado MD    Department, Room/Bed   [facility] OBSERVATION, 132/1       Discharge Date       Discharge Disposition       Discharge Destination                                 Attending Provider: Jovita Granado MD    Allergies: Hydrocodone-acetaminophen, Aspirin, Ibuprofen, Morphine    Isolation: None   Infection: None   Code Status: CPR    Ht: 181.6 cm (71.5\")   Wt: 78.9 kg (174 lb)    Admission Cmt: None   Principal Problem: Closed displaced fracture of right femoral neck [S72.001A]                   Active Insurance as of 12/28/2023       Primary Coverage       Payor Plan Insurance Group Employer/Plan Group    MEDICARE MEDICARE A & B        Payor Plan Address Payor Plan Phone Number Payor Plan Fax Number Effective Dates    [address] [phone]  12/1/2010 - None Entered    [facility] [id number]         Subscriber Name Subscriber Birth Date Member ID       HUMBLE ENRIQUEZ 1945 [id number]               Secondary Coverage       Payor Plan Insurance Group Employer/Plan Group    [employer]        Payor Plan Address Payor Plan Phone Number Payor Plan Fax Number Effective Dates    [address] [phone]  4/6/2017 - None Entered    [facility] [address]         Subscriber Name Subscriber Birth Date Member ID       HUMBLE ENRIQUEZ 1945 [id number]                     Emergency Contacts        (Rel.) Home Phone Work Phone Mobile Phone    Caty Enriquez (Spouse) [phone] -- [phone]                "

## 2023-12-29 NOTE — CONSULTS
Orthopaedic Consultation      Patient: Humble Rios    Date of Admission: 12/28/2023  2:40 PM    YOB: 1945    Medical Record Number: [id number]    Attending Physician:  Jovita Granado MD    Consulting Physician:  Francisco Pederson MD        Chief Complaints: Right hip pain.    History of Present Illness: 78 y.o. male admitted to [facility] with right minimally displaced femoral neck fracture. I was consulted for further evaluation and treatment. Onset of symptoms was abrupt starting 1 day ago.  Symptoms are associated with right hip pain.  Symptoms are aggravated by motion.   Symptoms improve with rest.  Orthopedics asked to see the patient regarding the fracture.    Allergies:   Allergies   Allergen Reactions    Hydrocodone-Acetaminophen Itching    Aspirin GI Intolerance and Other (See Comments)     STOMACH PAIN      Ibuprofen Nausea And Vomiting    Morphine Hives and Itching       Medications:   Home Medications:  Medications Prior to Admission   Medication Sig Dispense Refill Last Dose    amLODIPine (NORVASC) 5 MG tablet Take 1 tablet by mouth Daily.       atenolol (TENORMIN) 100 MG tablet Take 1 tablet by mouth Daily.       atorvastatin (LIPITOR) 80 MG tablet Take 1 tablet by mouth Daily.       clopidogrel (PLAVIX) 75 MG tablet Take 1 tablet by mouth Daily.       cyclobenzaprine (FLEXERIL) 10 MG tablet Take 1 tablet by mouth 3 (Three) Times a Day As Needed for Muscle Spasms.       docusate sodium 100 MG capsule Take 1 capsule by mouth As Needed.       furosemide (LASIX) 40 MG tablet Take 1 tablet by mouth Daily As Needed.       gabapentin (NEURONTIN) 300 MG capsule Take 2 capsules by mouth 2 (Two) Times a Day.       glimepiride (AMARYL) 2 MG tablet Take 1 tablet by mouth Every Night.       glimepiride (AMARYL) 4 MG tablet Take 1 tablet by mouth Every Morning.       Insulin Aspart 100 UNIT/ML solution cartridge Inject  under the skin into the appropriate area as directed See Admin  Instructions. Sliding Scale       Insulin Degludec (TRESIBA) 100 UNIT/ML solution injection Inject 14 Units under the skin into the appropriate area as directed Every Night.       ipratropium (ATROVENT) 0.06 % nasal spray 2 sprays into the nostril(s) as directed by provider 4 (Four) Times a Day.       losartan (COZAAR) 100 MG tablet Take 1 tablet by mouth Daily.       multivitamin with minerals tablet tablet Take 1 tablet by mouth Daily.       pantoprazole (PROTONIX) 40 MG EC tablet Take 1 tablet by mouth Daily.       prednisoLONE acetate (PRED FORTE) 1 % ophthalmic suspension Administer 1 drop to both eyes Daily.       tamsulosin (FLOMAX) 0.4 MG capsule 24 hr capsule Take 1 capsule by mouth 2 (Two) Times a Day.       triamcinolone (KENALOG) 0.1 % cream Apply 1 application  topically to the appropriate area as directed 2 (Two) Times a Day As Needed.       warfarin (COUMADIN) 3 MG tablet Take 1 tablet by mouth Every Night.       albuterol sulfate  (90 Base) MCG/ACT inhaler Inhale 2 puffs As Needed.       carvedilol (COREG) 12.5 MG tablet TAKE 1 TABLET BY MOUTH IN THE MORNING AND 1 IN THE EVENING WITH MEALS       cephalexin (Keflex) 500 MG capsule Take 1 capsule by mouth 2 (Two) Times a Day. 10 capsule 0     furosemide (LASIX) 20 MG tablet Take 2 tablets by mouth Daily.       glucose blood test strip Use to test glucose 3 times per day. DX: E11.9 and Z79.4.       guaiFENesin (MUCINEX) 600 MG 12 hr tablet Take 2 tablets by mouth Daily.       HYDROcodone-acetaminophen (Norco) 5-325 MG per tablet Take 1 tablet by mouth Every 6 (Six) Hours As Needed for Moderate Pain. 20 tablet 0     ketorolac (ACULAR) 0.5 % ophthalmic solution INSTILL 1 DROP THREE TIMES DAILY INTO LEFT EYE FOR 14 DAYS FOLLOWING SURGERY THEN STOP       magnesium oxide (MAG-OX) 400 MG tablet Take 1 tablet by mouth 2 (Two) Times a Day.       moxifloxacin (VIGAMOX) 0.5 % ophthalmic solution INSTILL 1 DROP INTO LEFT EYE THREE TIMES DAILY FOR 3 DAYS  BEFORE SURGERY THEN USE EVERY HOUR WHILE AWAKE DAY OF SURGERY, THEN USE FOUR TIMES DAILY FOR 7 DAYS AFTER SURGERY THEN STOP       nicotine (NICODERM CQ) 7 MG/24HR patch Place 1 patch on the skin as directed by provider Daily.       promethazine-dextromethorphan (PROMETHAZINE-DM) 6.25-15 MG/5ML syrup Take 5 mL by mouth As Needed.          Current Medications:  Scheduled Meds:amLODIPine, 5 mg, Oral, Daily  atenolol, 100 mg, Oral, Daily  atorvastatin, 80 mg, Oral, Daily  furosemide, 40 mg, Oral, Daily  gabapentin, 600 mg, Oral, BID  insulin glargine, 14 Units, Subcutaneous, Nightly  ipratropium, 2 spray, Each Nare, 4x Daily  losartan, 100 mg, Oral, Daily  multivitamin with minerals, 1 tablet, Oral, Daily  pantoprazole, 40 mg, Oral, QAM AC  senna-docusate sodium, 2 tablet, Oral, BID  tamsulosin, 0.4 mg, Oral, BID      Continuous Infusions:sodium chloride, 75 mL/hr, Last Rate: 75 mL/hr (12/28/23 2009)      PRN Meds:.  acetaminophen    albuterol    senna-docusate sodium **AND** polyethylene glycol **AND** bisacodyl **AND** bisacodyl    cyclobenzaprine    diphenhydrAMINE    furosemide    HYDROcodone-acetaminophen    HYDROmorphone **AND** naloxone    melatonin    ondansetron ODT **OR** ondansetron    Insert Peripheral IV **AND** sodium chloride    Past Medical History:   Diagnosis Date    Abrasion     BACK OF LEFT LEG, SCABBED OVER, HEALING    Anesthesia complication     WIFE STATES HE NEEDS CHILD ET TUBE DUE TO A CURVATURE OF HIS TRACHEA    Arthritis     Asthma     Atrial fibrillation     Bladder cancer     COPD (chronic obstructive pulmonary disease)     Diabetes mellitus     Diverticulitis     Enlarged prostate     Fatty liver     GERD (gastroesophageal reflux disease)     Hard to intubate     Hyperlipidemia     Hypertension     Pneumonia     Seasonal allergies     Skin cancer     Sleep apnea     NO CPAP MACHINE    Stage 4 chronic kidney disease     TIA (transient ischemic attack) 2018     Past Surgical History:    Procedure Laterality Date    CARDIAC CATHETERIZATION      CERVICAL FUSION ANTERIOR WITH ARTIFICIAL DISCECTOMY IMPLANTATION      CHOLECYSTECTOMY      COLONOSCOPY      CYSTOSCOPY BLADDER BIOPSY      CYSTOSCOPY BLADDER BIOPSY Bilateral 2023    Procedure: CYSTOSCOPY BILATERAL RETROGRADE PYELOGRAM AND BLADDER BIOPSY;  Surgeon: Kong Maurer MD;  Location: [facility] OR;  Service: Urology;  Laterality: Bilateral;    HEMORRHOIDECTOMY      X 2    HIP SURGERY Right     X 2 FOR BURSA REMOVAL    INGUINAL HERNIA REPAIR Right     LUMBAR DECOMPRESSION      LUMBAR LAMINECTOMY      TOE AMPUTATION Left     GREAT TOE     Social History     Occupational History    Not on file   Tobacco Use    Smoking status: Former     Packs/day: 0.25     Years: 70.00     Additional pack years: 0.00     Total pack years: 17.50     Types: Cigarettes     Quit date: 2022     Years since quittin.0    Smokeless tobacco: Never    Tobacco comments:     USING NICOTINE PATCHES DAILY   Vaping Use    Vaping Use: Never used   Substance and Sexual Activity    Alcohol use: Yes     Comment: OCCASSIONAL    Drug use: Never    Sexual activity: Defer      Social History     Social History Narrative    Not on file     Family History   Problem Relation Age of Onset    Malig Hyperthermia Neg Hx          Review of Systems:   No other pertinent positives or negatives other than what is mentioned in the HPI and below.  Constitutional: Negative for fatigue, fever, or weight loss  HEENT: No active headache.  Pulmonary: Patient denies SOA.  Cardiovascular: Patient denies any chest pain.  Gastrointestinal:  Patient denies active vomiting or diarrhea.  Musculoskeletal: Positive for right hip pain.  Neurological: Patient denies active dizziness or loss of consciousness.  Skin: Patient denies any active bleeding.    Vital signs in last 24 hours:  Temp:  [97.2 °F (36.2 °C)-98.4 °F (36.9 °C)] 98.4 °F (36.9 °C)  Heart Rate:  [62-68] 68  Resp:  [18-20] 18  BP:  "(112-188)/(58-73) 161/73  Vitals:    12/28/23 1438 12/28/23 1932 12/28/23 2300 12/29/23 0500   BP: (!) 188/65 112/58 161/73    BP Location:  Right arm Right arm    Patient Position:  Lying Lying    Pulse: 62 68     Resp: 20 20 18    Temp: 97.2 °F (36.2 °C) 97.5 °F (36.4 °C) 98.4 °F (36.9 °C)    TempSrc: Tympanic Oral Oral    SpO2: 98%      Weight:    78.9 kg (174 lb)   Height:    181.6 cm (71.5\")          Physical Exam: 78 y.o. male         General Appearance:  Alert, cooperative, in no acute distress    HEENT:    Atraumatic, Pupils are equal   Neck:   Cervical spine midline, no appreciable JVD   Lungs:     Breathing non-labored and chest rise symmetric    Heart:   Abdomen:     Rectal:       Extremities:   Pulses  Neurovascular:   Skin:   Musculoskeletal:      Pulse regular    Soft, Non-tender or distended    Deferred        No clubbing, cyanosis, or edema    Intact    Cranial nerves 2 - 12 grossly intact, sensation intact    No skin lesions  Examination of the right hip reveals tenderness to palpation.  Positive logroll.  Foot warm and well-perfused.  Normal motor and sensory exam.     Diagnostic Tests:    Results from last 7 days   Lab Units 12/29/23  0522   WBC 10*3/mm3 8.22   HEMOGLOBIN g/dL 11.3*   HEMATOCRIT % 34.1*   PLATELETS 10*3/mm3 239     Results from last 7 days   Lab Units 12/29/23  0522   SODIUM mmol/L 139   POTASSIUM mmol/L 4.4   CHLORIDE mmol/L 106   CO2 mmol/L 24.6   BUN mg/dL 22   CREATININE mg/dL 2.25*   GLUCOSE mg/dL 137*   CALCIUM mg/dL 8.1*     Results from last 7 days   Lab Units 12/28/23  1538   INR  2.18*         Assessment:    Closed displaced fracture of right femoral neck        Plan: I recommend a CT scan of the right hip to better evaluate the fracture pattern.  He will either need percutaneous pinning versus right bipolar hemiarthroplasty.  I have ordered another PT/INR.  He will remain NPO.  Surgery would be either later this evening versus tomorrow morning.  Patient agreed with the " plan.  All of his questions were answered.      Date: 12/29/2023  Francisco Pederson MD    Addendum: CT scan of right hip reviewed.  Significant displacement noted.  The patient will need a bipolar hemiarthroplasty.  We will schedule for tomorrow.  He can have a diet today and n.p.o. after midnight.  Cardiology consult pending.  We will hold his warfarin.  INR pending.  He may need vitamin K or plasma depending on findings.

## 2023-12-29 NOTE — PLAN OF CARE
Problem: Adult Inpatient Plan of Care  Goal: Plan of Care Review  Outcome: Ongoing, Progressing  Flowsheets (Taken 12/29/2023 1658)  Progress: no change  Plan of Care Reviewed With: patient  Outcome Evaluation: OR in AM.  Goal: Patient-Specific Goal (Individualized)  Outcome: Ongoing, Progressing  Goal: Absence of Hospital-Acquired Illness or Injury  Outcome: Ongoing, Progressing  Intervention: Identify and Manage Fall Risk  Recent Flowsheet Documentation  Taken 12/29/2023 1610 by Melyssa Riley RN  Safety Promotion/Fall Prevention:   activity supervised   assistive device/personal items within reach   clutter free environment maintained   fall prevention program maintained   nonskid shoes/slippers when out of bed   room organization consistent   safety round/check completed  Taken 12/29/2023 1430 by Melyssa Riley RN  Safety Promotion/Fall Prevention:   activity supervised   assistive device/personal items within reach   clutter free environment maintained   fall prevention program maintained   nonskid shoes/slippers when out of bed   room organization consistent   safety round/check completed  Taken 12/29/2023 1341 by Melyssa Riley RN  Safety Promotion/Fall Prevention: patient off unit  Taken 12/29/2023 1200 by Melyssa Riley RN  Safety Promotion/Fall Prevention:   activity supervised   assistive device/personal items within reach   clutter free environment maintained   fall prevention program maintained   nonskid shoes/slippers when out of bed   room organization consistent   safety round/check completed  Taken 12/29/2023 0950 by Melyssa Riely RN  Safety Promotion/Fall Prevention:   activity supervised   assistive device/personal items within reach   clutter free environment maintained   fall prevention program maintained   nonskid shoes/slippers when out of bed   room organization consistent   safety round/check completed  Taken 12/29/2023 0833 by Melyssa Riley RN  Safety  Promotion/Fall Prevention:   activity supervised   assistive device/personal items within reach   clutter free environment maintained   fall prevention program maintained   nonskid shoes/slippers when out of bed   room organization consistent   safety round/check completed  Intervention: Prevent Skin Injury  Recent Flowsheet Documentation  Taken 12/29/2023 1610 by Melyssa Riley RN  Body Position:   tilted   left  Taken 12/29/2023 1430 by Melyssa Riley RN  Body Position:   tilted   left  Taken 12/29/2023 1200 by Melyssa Riley RN  Body Position: supine, legs elevated  Taken 12/29/2023 0950 by Melyssa Riley RN  Body Position:   tilted   left  Taken 12/29/2023 0833 by Melyssa Riley RN  Body Position:   left   tilted  Goal: Optimal Comfort and Wellbeing  Outcome: Ongoing, Progressing  Goal: Readiness for Transition of Care  Outcome: Ongoing, Progressing     Problem: Skin Injury Risk Increased  Goal: Skin Health and Integrity  Outcome: Ongoing, Progressing  Intervention: Optimize Skin Protection  Recent Flowsheet Documentation  Taken 12/29/2023 1610 by Melyssa Riley RN  Head of Bed (HOB) Positioning: HOB at 20-30 degrees  Taken 12/29/2023 1430 by Melyssa Riley RN  Head of Bed (HOB) Positioning: HOB at 20-30 degrees  Taken 12/29/2023 1200 by Melyssa Riley RN  Head of Bed (HOB) Positioning: HOB at 30-45 degrees  Taken 12/29/2023 0950 by Melyssa Riley RN  Head of Bed (HOB) Positioning: HOB at 30-45 degrees  Taken 12/29/2023 0833 by Melyssa Riley RN  Head of Bed (HOB) Positioning: HOB at 30-45 degrees     Problem: Diabetes Comorbidity  Goal: Blood Glucose Level Within Targeted Range  Outcome: Ongoing, Progressing     Problem: Hypertension Comorbidity  Goal: Blood Pressure in Desired Range  Outcome: Ongoing, Progressing     Problem: Fall Injury Risk  Goal: Absence of Fall and Fall-Related Injury  Outcome: Ongoing, Progressing  Intervention: Promote Injury-Free  Environment  Recent Flowsheet Documentation  Taken 12/29/2023 1610 by Melyssa Riley RN  Safety Promotion/Fall Prevention:   activity supervised   assistive device/personal items within reach   clutter free environment maintained   fall prevention program maintained   nonskid shoes/slippers when out of bed   room organization consistent   safety round/check completed  Taken 12/29/2023 1430 by Melyssa Riley RN  Safety Promotion/Fall Prevention:   activity supervised   assistive device/personal items within reach   clutter free environment maintained   fall prevention program maintained   nonskid shoes/slippers when out of bed   room organization consistent   safety round/check completed  Taken 12/29/2023 1341 by Melyssa Riley RN  Safety Promotion/Fall Prevention: patient off unit  Taken 12/29/2023 1200 by Melyssa Riley RN  Safety Promotion/Fall Prevention:   activity supervised   assistive device/personal items within reach   clutter free environment maintained   fall prevention program maintained   nonskid shoes/slippers when out of bed   room organization consistent   safety round/check completed  Taken 12/29/2023 0950 by Melyssa Riley RN  Safety Promotion/Fall Prevention:   activity supervised   assistive device/personal items within reach   clutter free environment maintained   fall prevention program maintained   nonskid shoes/slippers when out of bed   room organization consistent   safety round/check completed  Taken 12/29/2023 0833 by Melyssa Riley RN  Safety Promotion/Fall Prevention:   activity supervised   assistive device/personal items within reach   clutter free environment maintained   fall prevention program maintained   nonskid shoes/slippers when out of bed   room organization consistent   safety round/check completed   Goal Outcome Evaluation:  Plan of Care Reviewed With: patient        Progress: no change  Outcome Evaluation: OR in AM.

## 2023-12-30 LAB
ANION GAP SERPL CALCULATED.3IONS-SCNC: 8.6 MMOL/L (ref 5–15)
BUN SERPL-MCNC: 22 MG/DL (ref 8–23)
BUN/CREAT SERPL: 9.5 (ref 7–25)
CALCIUM SPEC-SCNC: 8.3 MG/DL (ref 8.6–10.5)
CHLORIDE SERPL-SCNC: 105 MMOL/L (ref 98–107)
CO2 SERPL-SCNC: 24.4 MMOL/L (ref 22–29)
CREAT SERPL-MCNC: 2.32 MG/DL (ref 0.76–1.27)
DEPRECATED RDW RBC AUTO: 45.4 FL (ref 37–54)
EGFRCR SERPLBLD CKD-EPI 2021: 28.1 ML/MIN/1.73
ERYTHROCYTE [DISTWIDTH] IN BLOOD BY AUTOMATED COUNT: 14.2 % (ref 12.3–15.4)
GLUCOSE BLDC GLUCOMTR-MCNC: 129 MG/DL (ref 70–130)
GLUCOSE BLDC GLUCOMTR-MCNC: 152 MG/DL (ref 70–130)
GLUCOSE BLDC GLUCOMTR-MCNC: 94 MG/DL (ref 70–130)
GLUCOSE BLDC GLUCOMTR-MCNC: 98 MG/DL (ref 70–130)
GLUCOSE SERPL-MCNC: 143 MG/DL (ref 65–99)
HBA1C MFR BLD: 8.7 % (ref 4.8–5.6)
HCT VFR BLD AUTO: 34.9 % (ref 37.5–51)
HGB BLD-MCNC: 11.3 G/DL (ref 13–17.7)
INR PPP: 1.4 (ref 0.9–1.1)
MCH RBC QN AUTO: 28.9 PG (ref 26.6–33)
MCHC RBC AUTO-ENTMCNC: 32.4 G/DL (ref 31.5–35.7)
MCV RBC AUTO: 89.3 FL (ref 79–97)
PLATELET # BLD AUTO: 221 10*3/MM3 (ref 140–450)
PMV BLD AUTO: 9.8 FL (ref 6–12)
POTASSIUM SERPL-SCNC: 4.7 MMOL/L (ref 3.5–5.2)
PROTHROMBIN TIME: 17.4 SECONDS (ref 11.7–14.2)
RBC # BLD AUTO: 3.91 10*6/MM3 (ref 4.14–5.8)
SODIUM SERPL-SCNC: 138 MMOL/L (ref 136–145)
WBC NRBC COR # BLD AUTO: 8.59 10*3/MM3 (ref 3.4–10.8)

## 2023-12-30 PROCEDURE — 83036 HEMOGLOBIN GLYCOSYLATED A1C: CPT | Performed by: STUDENT IN AN ORGANIZED HEALTH CARE EDUCATION/TRAINING PROGRAM

## 2023-12-30 PROCEDURE — 85610 PROTHROMBIN TIME: CPT | Performed by: STUDENT IN AN ORGANIZED HEALTH CARE EDUCATION/TRAINING PROGRAM

## 2023-12-30 PROCEDURE — C1769 GUIDE WIRE: HCPCS | Performed by: INTERNAL MEDICINE

## 2023-12-30 PROCEDURE — 93458 L HRT ARTERY/VENTRICLE ANGIO: CPT | Performed by: INTERNAL MEDICINE

## 2023-12-30 PROCEDURE — B2111ZZ FLUOROSCOPY OF MULTIPLE CORONARY ARTERIES USING LOW OSMOLAR CONTRAST: ICD-10-PCS | Performed by: INTERNAL MEDICINE

## 2023-12-30 PROCEDURE — B2151ZZ FLUOROSCOPY OF LEFT HEART USING LOW OSMOLAR CONTRAST: ICD-10-PCS | Performed by: INTERNAL MEDICINE

## 2023-12-30 PROCEDURE — 25010000002 HEPARIN (PORCINE) PER 1000 UNITS: Performed by: INTERNAL MEDICINE

## 2023-12-30 PROCEDURE — 63710000001 INSULIN GLARGINE PER 5 UNITS: Performed by: INTERNAL MEDICINE

## 2023-12-30 PROCEDURE — 82948 REAGENT STRIP/BLOOD GLUCOSE: CPT

## 2023-12-30 PROCEDURE — 25010000002 FENTANYL CITRATE (PF) 50 MCG/ML SOLUTION: Performed by: INTERNAL MEDICINE

## 2023-12-30 PROCEDURE — C1894 INTRO/SHEATH, NON-LASER: HCPCS | Performed by: INTERNAL MEDICINE

## 2023-12-30 PROCEDURE — 25010000002 MIDAZOLAM PER 1 MG: Performed by: INTERNAL MEDICINE

## 2023-12-30 PROCEDURE — 85027 COMPLETE CBC AUTOMATED: CPT | Performed by: STUDENT IN AN ORGANIZED HEALTH CARE EDUCATION/TRAINING PROGRAM

## 2023-12-30 PROCEDURE — 99152 MOD SED SAME PHYS/QHP 5/>YRS: CPT | Performed by: INTERNAL MEDICINE

## 2023-12-30 PROCEDURE — 25510000001 IOPAMIDOL PER 1 ML: Performed by: INTERNAL MEDICINE

## 2023-12-30 PROCEDURE — 4A023N7 MEASUREMENT OF CARDIAC SAMPLING AND PRESSURE, LEFT HEART, PERCUTANEOUS APPROACH: ICD-10-PCS | Performed by: INTERNAL MEDICINE

## 2023-12-30 PROCEDURE — 80048 BASIC METABOLIC PNL TOTAL CA: CPT | Performed by: STUDENT IN AN ORGANIZED HEALTH CARE EDUCATION/TRAINING PROGRAM

## 2023-12-30 RX ORDER — NITROGLYCERIN 0.4 MG/1
0.4 TABLET SUBLINGUAL
Status: DISCONTINUED | OUTPATIENT
Start: 2023-12-30 | End: 2024-01-03 | Stop reason: HOSPADM

## 2023-12-30 RX ORDER — HEPARIN SODIUM 1000 [USP'U]/ML
INJECTION, SOLUTION INTRAVENOUS; SUBCUTANEOUS
Status: DISCONTINUED | OUTPATIENT
Start: 2023-12-30 | End: 2023-12-30 | Stop reason: HOSPADM

## 2023-12-30 RX ORDER — AMLODIPINE BESYLATE 10 MG/1
10 TABLET ORAL DAILY
Status: DISCONTINUED | OUTPATIENT
Start: 2023-12-31 | End: 2023-12-30

## 2023-12-30 RX ORDER — MIDAZOLAM HYDROCHLORIDE 1 MG/ML
INJECTION INTRAMUSCULAR; INTRAVENOUS
Status: DISCONTINUED | OUTPATIENT
Start: 2023-12-30 | End: 2023-12-30 | Stop reason: HOSPADM

## 2023-12-30 RX ORDER — VERAPAMIL HYDROCHLORIDE 2.5 MG/ML
INJECTION, SOLUTION INTRAVENOUS
Status: DISCONTINUED | OUTPATIENT
Start: 2023-12-30 | End: 2023-12-30 | Stop reason: HOSPADM

## 2023-12-30 RX ORDER — FENTANYL CITRATE 50 UG/ML
INJECTION, SOLUTION INTRAMUSCULAR; INTRAVENOUS
Status: DISCONTINUED | OUTPATIENT
Start: 2023-12-30 | End: 2023-12-30 | Stop reason: HOSPADM

## 2023-12-30 RX ORDER — AMLODIPINE BESYLATE 5 MG/1
5 TABLET ORAL DAILY
Status: DISCONTINUED | OUTPATIENT
Start: 2023-12-31 | End: 2024-01-03 | Stop reason: HOSPADM

## 2023-12-30 RX ORDER — LIDOCAINE HYDROCHLORIDE 20 MG/ML
INJECTION, SOLUTION INFILTRATION; PERINEURAL
Status: DISCONTINUED | OUTPATIENT
Start: 2023-12-30 | End: 2023-12-30 | Stop reason: HOSPADM

## 2023-12-30 RX ADMIN — SENNOSIDES AND DOCUSATE SODIUM 2 TABLET: 50; 8.6 TABLET ORAL at 14:27

## 2023-12-30 RX ADMIN — HYDROCODONE BITARTRATE AND ACETAMINOPHEN 1 TABLET: 5; 325 TABLET ORAL at 23:01

## 2023-12-30 RX ADMIN — Medication 1 TABLET: at 14:27

## 2023-12-30 RX ADMIN — HYDROCODONE BITARTRATE AND ACETAMINOPHEN 1 TABLET: 5; 325 TABLET ORAL at 13:33

## 2023-12-30 RX ADMIN — ATORVASTATIN CALCIUM 80 MG: 80 TABLET, FILM COATED ORAL at 14:27

## 2023-12-30 RX ADMIN — HYDROCODONE BITARTRATE AND ACETAMINOPHEN 1 TABLET: 5; 325 TABLET ORAL at 04:20

## 2023-12-30 RX ADMIN — LOSARTAN POTASSIUM 100 MG: 100 TABLET, FILM COATED ORAL at 17:04

## 2023-12-30 RX ADMIN — GABAPENTIN 600 MG: 300 CAPSULE ORAL at 20:45

## 2023-12-30 RX ADMIN — HYDROCODONE BITARTRATE AND ACETAMINOPHEN 1 TABLET: 5; 325 TABLET ORAL at 08:34

## 2023-12-30 RX ADMIN — ATENOLOL 100 MG: 50 TABLET ORAL at 08:34

## 2023-12-30 RX ADMIN — INSULIN GLARGINE 10 UNITS: 100 INJECTION, SOLUTION SUBCUTANEOUS at 20:45

## 2023-12-30 NOTE — PROGRESS NOTES
"Patient Care Team:  Dereck Miramontes MD as PCP - General  Dereck Miramontes MD as PCP - Family Medicine    Sub: No chest pain today     Objective   Vital Signs  Temp:  [97.6 °F (36.4 °C)-98.1 °F (36.7 °C)] 98.1 °F (36.7 °C)  Heart Rate:  [57-72] 72  Resp:  [10-18] 10  BP: (147-176)/(62-82) 174/82    Intake/Output Summary (Last 24 hours) at 12/30/2023 1140  Last data filed at 12/30/2023 0800  Gross per 24 hour   Intake 360 ml   Output 3300 ml   Net -2940 ml     Flowsheet Rows      Flowsheet Row First Filed Value   Admission Height 181.6 cm (71.5\") Documented at 12/29/2023 0500   Admission Weight 78.9 kg (174 lb) Documented at 12/29/2023 0500            Physical Exam:   General Appearance:    Alert, cooperative, in no acute distress   Lungs:     Clear to auscultation,respirations regular, even and    unlabored    Heart:    Regular rhythm and normal rate, normal S1 and S2, no  murmur, no gallop, no rub, no click   Chest Wall:    No abnormalities observed   Abdomen:     Normal bowel sounds, no masses, no organomegaly, soft    non-tender, non-distended, no guarding, no rebound                tenderness   Extremities:   no edema, no cyanosis, no  redness     Results Review:    ECG 12 Lead Pre-Op / Pre-Procedure   Final Result   HEART RATE= 59  bpm   RR Interval= 1017  ms   MI Interval= 194  ms   P Horizontal Axis= -13  deg   P Front Axis= 45  deg   QRSD Interval= 104  ms   QT Interval= 394  ms   QTcB= 391  ms   QRS Axis= -59  deg   T Wave Axis= 30  deg   - ABNORMAL ECG -   Sinus rhythm   RSR' in V1 or V2, probably normal variant   Inferior infarct, old   Borderline ST elevation, anterolateral leads   No Prior Tracing for Comparison   Electronically Signed By: Desi Trevino ([facility], [place]) 29-Dec-2023 12:36:05   Date and Time of Study: 2023-12-29 10:50:42      SCANNED - TELEMETRY     Final Result      SCANNED - TELEMETRY     Final Result      SCANNED - TELEMETRY     Final Result      SCANNED - TELEMETRY     Final Result    "   SCANNED - TELEMETRY     Final Result      SCANNED - TELEMETRY     Final Result        Results for orders placed during the hospital encounter of 12/28/23    Adult Transthoracic Echo Complete w/ Color, Spectral and Contrast if Necessary Per Protocol    Interpretation Summary    Left ventricular systolic function is normal. Calculated left ventricular EF = 66.2%    Left ventricular wall thickness is consistent with mild concentric hypertrophy.    Left ventricular diastolic function is consistent with (grade Ia w/high LAP) impaired relaxation.    Estimated right ventricular systolic pressure from tricuspid regurgitation is mildly elevated (35-45 mmHg). Calculated right ventricular systolic pressure from tricuspid regurgitation is 38 mmHg.    Results for orders placed during the hospital encounter of 12/28/23    Cardiac Catheterization/Vascular Study (Needs Review)  This result has not been signed. Information might be incomplete.    Narrative  :  Zofia Phillips MD    Procedures performed:  1.  Left heart catheterization.  2.  Left ventriculography  3.  Selective native coronary angiography    Indications:  Unstable angina      Description of the procedure:  Patient was brought to the cardiac catheter was explained the risk and benefit and alternatives of the procedure. Patient signed informed consent, was prepped and draped in sterile fashion for right radial artery access.  Using micropuncture needle and modified Seldinger technique a 5/6 Frisian sheath was advanced into the right radial artery.  JL3.5 and JR4 catheter was used to engage the left and right coronary artery respectively.  A JR 4 catheter was used to cross the aortic valve and perform a left heart catheterization and LV gram.    All exchanges were done over the wire.  Hemostasis was achieved by manual compression / TR band.  Moderate sedation 15 minutes.    No complications noted.    Findings:  1.  Left heart catheterization: LVEDP 7 mmHg. No  gradient across the AV valve.    2.  Left ventriculography:  LV function normal.  LVEF 60-65%.    3.  Selective native coronary angiography:  A. Left main: short vessel and quickly bifurcates into LAD and left circumflex. No angiographic disease noted in left main.  B. LAD: Large caliber vessel gives rise to 1 prominent diagonal branch.  Mid LAD 20-30% stenosis.  Distal LAD 20% stenosis.  C. Left circumflex: Large-caliber vessel gives rise to 3 obtuse marginal branches.  Mid circumflex has 30% stenosis.  Obtuse marginal branches with luminal irregularities not more than 10% stenosis.  D. Right coronary artery: Large-caliber dominant vessel distally divides into RPDA and RPLV.  Right coronary artery with diffuse 20% stenosis.      Conclusion:  1.  Preserved LVEF and normal LVEDP.  2.  Mild nonobstructive coronary artery disease.      Recommendations:  Aggressive risk factor modification.  Medical management.      Zofia Phillips M.D    Results from last 7 days   Lab Units 12/30/23  0421   SODIUM mmol/L 138   POTASSIUM mmol/L 4.7   CHLORIDE mmol/L 105   CO2 mmol/L 24.4   BUN mg/dL 22   CREATININE mg/dL 2.32*   GLUCOSE mg/dL 143*   CALCIUM mg/dL 8.3*         Results from last 7 days   Lab Units 12/30/23  0421 12/29/23  0522 12/28/23  1538   WBC 10*3/mm3 8.59 8.22 9.42   HEMOGLOBIN g/dL 11.3* 11.3* 11.4*   HEMATOCRIT % 34.9* 34.1* 34.6*   PLATELETS 10*3/mm3 221 239 269     Results from last 7 days   Lab Units 12/30/23  0421 12/29/23  0844 12/28/23  1538   INR  1.40* 2.14* 2.18*               [START ON 12/31/2023] amLODIPine, 5 mg, Oral, Daily  atenolol, 100 mg, Oral, Daily  [MAR Hold] atorvastatin, 80 mg, Oral, Daily  gabapentin, 600 mg, Oral, BID  [MAR Hold] insulin glargine, 10 Units, Subcutaneous, Nightly  losartan, 100 mg, Oral, Daily  [MAR Hold] multivitamin with minerals, 1 tablet, Oral, Daily  [MAR Hold] pantoprazole, 40 mg, Oral, QAM AC  [MAR Hold] senna-docusate sodium, 2 tablet, Oral, BID  [MAR Hold]  tamsulosin, 0.4 mg, Oral, BID      sodium chloride, 75 mL/hr, Last Rate: 75 mL/hr (12/29/23 1107)          I reviewed the patient's new clinical results.  I personally viewed and interpreted the patient's EKG/Telemetry data    Assessment/Plan:  Closed displaced fracture of right femoral neck  HTN  HLD  DM  PVD  Paroxysmal atrial fibrillation : LVEF normal    Mild nonobstructive CAD     Patient underwent cardiac cath for unstable angina symptoms: [facility] with mild nonobstructive CAD. LVEF and LVEDP normal   Continue Beta blocker and anti HTN medications / amlodipine     He remains at low risk for perioperative cardiac event during orthopaedic surgery. Currently well optimized from volume status. Tele stable. No sings of overt ischemia or heart failure.   Continue current medications.   Resume anticoagulation after surgery as per orthopaedic surgery.         Zofia Phillips MD  12/30/23  11:40 EST

## 2023-12-30 NOTE — PLAN OF CARE
Goal Outcome Evaluation:     Patient A&O x4, VSS, pain has been well controlled with the vicodin.  NPO since midnight for cardiac cath this morning. Wife at bedside and aware of the plan to do cardiac cath to get clearance for hip surgery.

## 2023-12-30 NOTE — PROGRESS NOTES
Name: Humble Rios ADMIT: 2023   : 1945  PCP: Dereck Miramontes MD    MRN: [id number] LOS: 2 days   AGE/SEX: 78 y.o. male  ROOM: 132/1     Subjective   Subjective   No acute events overnight.  Pain is better controlled.  The patient was evaluated by cardiology yesterday and they have decided to take the patient to the Cath Lab today prior to her proceeding with surgery.  He denies any chest pain or shortness of breath this morning.    Objective   Objective     Vital Signs  Temp:  [97.6 °F (36.4 °C)-98.1 °F (36.7 °C)] 98.1 °F (36.7 °C)  Heart Rate:  [57-72] 72  Resp:  [16-18] 16  BP: (147-176)/(62-82) 174/82  SpO2:  [95 %] 95 %  on  Flow (L/min):  [2] 2;   Device (Oxygen Therapy): nasal cannula  Body mass index is 24.27 kg/m².    Physical Exam  General: Sitting up in bed, no acute distress.  More comfortable appearing today.  ENT: No conjunctival injection or scleral icterus. Moist mucous membranes.   Neuro: Eyes open and moving in all directions, strength exam deferred due to injury, no focal deficits.   Lungs: Clear to auscultation bilaterally. No wheeze or crackles. No distress.   Heart: RRR, no murmurs.   Abdomen: Soft, non-tender, non-distended. Normal bowel sounds.   Ext: Right lower extremity held at ankle, ROM deferred due to injury and pain.  Skin: No rashes or lesions. IV site without swelling or erythema.     Results Review     I reviewed the patient's new clinical results:  Results from last 7 days   Lab Units 23  0421 23  0522 23  1538   WBC 10*3/mm3 8.59 8.22 9.42   HEMOGLOBIN g/dL 11.3* 11.3* 11.4*   PLATELETS 10*3/mm3 221 239 269     Results from last 7 days   Lab Units 23  0421 23  0522 23  1538   SODIUM mmol/L 138 139 141   POTASSIUM mmol/L 4.7 4.4 4.6   CHLORIDE mmol/L 105 106 107   CO2 mmol/L 24.4 24.6 25.0   BUN mg/dL 22 22 28*   CREATININE mg/dL 2.32* 2.25* 2.45*   GLUCOSE mg/dL 143* 137* 146*   EGFR mL/min/1.73 28.1* 29.1* 26.3*        Results from last 7 days   Lab Units 12/30/23  0421 12/29/23  0522 12/28/23  1538   CALCIUM mg/dL 8.3* 8.1* 8.4*       Hemoglobin A1C   Date/Time Value Ref Range Status   12/30/2023 0421 8.70 (H) 4.80 - 5.60 % Final     Glucose   Date/Time Value Ref Range Status   12/30/2023 0736 98 70 - 130 mg/dL Final   12/29/2023 1949 194 (H) 70 - 130 mg/dL Final   12/29/2023 1629 133 (H) 70 - 130 mg/dL Final   12/29/2023 1144 72 70 - 130 mg/dL Final       CT Lower Extremity Right Without Contrast    Result Date: 12/29/2023  Acute right femoral neck fracture with impaction and varus angulation.  Radiation dose reduction techniques were utilized, including automated exposure control and exposure modulation based on body size.   This report was finalized on 12/29/2023 12:39 PM by Dr. Moises Pink M.D on Workstation: [id number]       I have personally reviewed all medications:  Scheduled Medications  [START ON 12/31/2023] amLODIPine, 5 mg, Oral, Daily  atenolol, 100 mg, Oral, Daily  atorvastatin, 80 mg, Oral, Daily  gabapentin, 600 mg, Oral, BID  insulin glargine, 10 Units, Subcutaneous, Nightly  losartan, 100 mg, Oral, Daily  multivitamin with minerals, 1 tablet, Oral, Daily  pantoprazole, 40 mg, Oral, QAM AC  senna-docusate sodium, 2 tablet, Oral, BID  tamsulosin, 0.4 mg, Oral, BID    Infusions  sodium chloride, 75 mL/hr, Last Rate: 75 mL/hr (12/29/23 1107)    Diet  NPO Diet NPO Type: Strict NPO    Assessment/Plan     Active Hospital Problems    Diagnosis  POA    **Closed displaced fracture of right femoral neck [S72.001A]  Yes    CKD (chronic kidney disease) [N18.9]  Unknown    Atrial fibrillation [I48.91]  Unknown    HTN (hypertension) [I10]  Unknown    CHF (congestive heart failure) [I50.9]  Unknown    Anemia [D64.9]  Unknown    Type 2 diabetes mellitus [E11.9]  Unknown    PVD (peripheral vascular disease) [I73.9]  Unknown      Resolved Hospital Problems   No resolved problems to display.       78 y.o. male with Closed  displaced fracture of right femoral neck.    Right femoral neck fracture  -XR hip with acute transcervical right femoral neck fracture  -Orthopedic surgery consulted and following, appreciate their assistance  -CT lower extremity with acute right femoral neck fracture with impaction and varus angulation  -NPO for now  -Now going for cardiac catheterization today; surgery to timing to be determined by cardiology    CKD  -Creatinine 2.45 on arrival, stable at 2.32 today  -Seems consistent with patient's baseline  -Avoid nephrotoxic agents including NSAIDs and contrast dyes  -Monitor with daily BMP  -If creatinine worsens from baseline, will ask nephrology to evaluate  -Continue IV fluids given that patient is going for cardiac catheterization today    Atrial fibrillation  HTN  CHF  -Continue amlodipine, atenolol  -Difficult to ascertain METS, as patient states he is limited at baseline due to chronic hip pain.  He states he typically does not have shortness of breath or chest pain with activity.  -Unable to see echo done at [facility], follows with [facility] cardiology  -Hold Lasix for now while n.p.o.  -Checked EKG preoperatively  -Hold warfarin, INR 1.4 this morning  -Echocardiogram with EF 66.2%, mild concentric hypertrophy, grade 1 diastolic dysfunction  -Cardiology evaluated patient yesterday, they are planning to proceed with cardiac catheterization prior to surgery  -Continue ongoing discussions with cardiology to determine appropriate timing for surgery  -BP remains high, likely due to pain; continue to monitor for now but if blood pressure remains high can consider increasing amlodipine to 10 mg    Anemia  -Hemoglobin 11.4 on arrival, consistent with baseline  -No signs of active bleeding, but at risk with fracture  -Monitor with daily CBC, transfuse for Hgb less than 7    Type 2 diabetes mellitus  -Hemoglobin A1c 8.7  -Hold home oral agents  -Continue Lantus and SSI  -Titrate insulin doses as needed based on blood  glucose levels    PVD  -Hold clopidogrel for now, restart postoperatively when okay from orthopedic surgery standpoint  -Confirmed Plavix is due to PVD, no cardiac stents placed previous    SCDs for DVT prophylaxis.  Full code.  Discussed with patient, family, and nursing staff.  Anticipate discharge TBD      Jovita Granado MD  [facility]  12/30/23  09:17 EST

## 2023-12-30 NOTE — PROGRESS NOTES
Orthopedic Progress Note    Subjective :   Resting comfortably in bed.  Right hip pain improved.    Objective :    Vital signs in last 24 hours:  Temp:  [97.6 °F (36.4 °C)-98.1 °F (36.7 °C)] 98.1 °F (36.7 °C)  Heart Rate:  [57-72] 72  Resp:  [16-18] 16  BP: (147-176)/(62-82) 174/82  Vitals:    12/29/23 1431 12/29/23 1902 12/30/23 0411 12/30/23 0737   BP: 176/62 147/64 175/76 174/82   BP Location: Right arm Right arm Right arm Right arm   Patient Position: Lying Lying Lying Lying   Pulse: 57   72   Resp: 18 18 18 16   Temp: 97.7 °F (36.5 °C) 97.6 °F (36.4 °C)  Comment: pt just had cold water 98.1 °F (36.7 °C)    TempSrc: Oral Axillary Oral    SpO2: 95%   95%   Weight:       Height:           PHYSICAL EXAM:  Patient is calm, in no acute distress, awake and oriented x 3.  Examination of the right hip reveals pain with logroll.  Normal motor and sensory exam.  Foot warm and well-perfused.  Tenderness over the hip area.  Limited mobility.    LABS:  Results from last 7 days   Lab Units 12/30/23  0421   WBC 10*3/mm3 8.59   HEMOGLOBIN g/dL 11.3*   HEMATOCRIT % 34.9*   PLATELETS 10*3/mm3 221     Results from last 7 days   Lab Units 12/30/23  0421   SODIUM mmol/L 138   POTASSIUM mmol/L 4.7   CHLORIDE mmol/L 105   CO2 mmol/L 24.4   BUN mg/dL 22   CREATININE mg/dL 2.32*   GLUCOSE mg/dL 143*   CALCIUM mg/dL 8.3*     Results from last 7 days   Lab Units 12/30/23  0421 12/29/23  0844 12/28/23  1538   INR  1.40* 2.14* 2.18*       ASSESSMENT:  Right displaced femoral neck fracture    Plan:  Cardiac catheterization today.  Possible surgery tomorrow morning depending on results of catheterization.  INR currently 1.4.  This was discussed with the family.  Will plan for right bipolar hip hemiarthroplasty.  All the questions answered.  N.p.o. after midnight if appropriate for surgery tomorrow.      Francisco Pederson MD    Date: 12/30/2023  Time: 10:31 EST

## 2023-12-30 NOTE — PLAN OF CARE
Goal Outcome Evaluation:  Plan of Care Reviewed With: patient        Progress: no change  Outcome Evaluation: Patient arrived to unit this afternoon following heart cath. TR band in place. Delay in removal d/t continued bleeding during attempt of air removal. TR band was removed with dressing applied at 1810. Site free of bruising of swelling. Prakash cath in place with good urine output. Noted in report it was r/t bedrest. Patient on o2 on arrival, have weaned down to 1L and in no resp distress. Patient is on schedule for hip surgery tomorrow am. Vital signs stable. Call light in reach. Safety  maintained. Patient is alert and oriented with forgetfulness. Spouse remains at bedside.

## 2023-12-30 NOTE — PROCEDURES
:   Zofia Phillips MD    Procedures performed:  1.  Left heart catheterization.  2.  Left ventriculography    3.  Selective native coronary angiography    Indications:  Unstable angina        Description of the procedure:  Patient was brought to the cardiac catheter was explained the risk and benefit and alternatives of the procedure. Patient signed informed consent, was prepped and draped in sterile fashion for right radial artery access.  Using micropuncture needle and modified Seldinger technique a 5/6 Frisian sheath was advanced into the right radial artery.  JL3.5 and JR4 catheter was used to engage the left and right coronary artery respectively.  A JR 4 catheter was used to cross the aortic valve and perform a left heart catheterization and LV gram.    All exchanges were done over the wire.   Hemostasis was achieved by manual compression / TR band.   Moderate sedation 15 minutes.     No complications noted.    Findings:  1.  Left heart catheterization: LVEDP 7 mmHg. No gradient across the AV valve.      2.  Left ventriculography:  LV function normal.  LVEF 60-65%.     3.  Selective native coronary angiography:  A. Left main: short vessel and quickly bifurcates into LAD and left circumflex. No angiographic disease noted in left main.   B. LAD: Large caliber vessel gives rise to 1 prominent diagonal branch.  Mid LAD 20-30% stenosis.  Distal LAD 20% stenosis.  C. Left circumflex: Large-caliber vessel gives rise to 3 obtuse marginal branches.  Mid circumflex has 30% stenosis.  Obtuse marginal branches with luminal irregularities not more than 10% stenosis.  D. Right coronary artery: Large-caliber dominant vessel distally divides into RPDA and RPLV.  Right coronary artery with diffuse 20% stenosis.      Conclusion:  1.  Preserved LVEF and normal LVEDP.  2.  Mild nonobstructive coronary artery disease.      Recommendations:  Aggressive risk factor modification.  Medical management.      Zofia Phillips M.D

## 2023-12-31 ENCOUNTER — ANESTHESIA EVENT (OUTPATIENT)
Dept: PERIOP | Facility: HOSPITAL | Age: 78
End: 2023-12-31
Payer: MEDICARE

## 2023-12-31 ENCOUNTER — APPOINTMENT (OUTPATIENT)
Dept: GENERAL RADIOLOGY | Facility: HOSPITAL | Age: 78
End: 2023-12-31
Payer: MEDICARE

## 2023-12-31 ENCOUNTER — ANESTHESIA (OUTPATIENT)
Dept: PERIOP | Facility: HOSPITAL | Age: 78
End: 2023-12-31
Payer: MEDICARE

## 2023-12-31 LAB
ANION GAP SERPL CALCULATED.3IONS-SCNC: 14.6 MMOL/L (ref 5–15)
BUN SERPL-MCNC: 29 MG/DL (ref 8–23)
BUN/CREAT SERPL: 12.1 (ref 7–25)
CALCIUM SPEC-SCNC: 8.9 MG/DL (ref 8.6–10.5)
CHLORIDE SERPL-SCNC: 101 MMOL/L (ref 98–107)
CHOLEST SERPL-MCNC: 132 MG/DL (ref 0–200)
CO2 SERPL-SCNC: 20.4 MMOL/L (ref 22–29)
CREAT SERPL-MCNC: 2.39 MG/DL (ref 0.76–1.27)
DEPRECATED RDW RBC AUTO: 47.1 FL (ref 37–54)
EGFRCR SERPLBLD CKD-EPI 2021: 27.1 ML/MIN/1.73
ERYTHROCYTE [DISTWIDTH] IN BLOOD BY AUTOMATED COUNT: 14.2 % (ref 12.3–15.4)
GLUCOSE BLDC GLUCOMTR-MCNC: 109 MG/DL (ref 70–130)
GLUCOSE BLDC GLUCOMTR-MCNC: 131 MG/DL (ref 70–130)
GLUCOSE BLDC GLUCOMTR-MCNC: 150 MG/DL (ref 70–130)
GLUCOSE BLDC GLUCOMTR-MCNC: 263 MG/DL (ref 70–130)
GLUCOSE BLDC GLUCOMTR-MCNC: 267 MG/DL (ref 70–130)
GLUCOSE SERPL-MCNC: 150 MG/DL (ref 65–99)
HCT VFR BLD AUTO: 37.6 % (ref 37.5–51)
HDLC SERPL-MCNC: 44 MG/DL (ref 40–60)
HGB BLD-MCNC: 12 G/DL (ref 13–17.7)
INR PPP: 1.08 (ref 0.9–1.1)
LDLC SERPL CALC-MCNC: 69 MG/DL (ref 0–100)
LDLC/HDLC SERPL: 1.54 {RATIO}
MCH RBC QN AUTO: 28.9 PG (ref 26.6–33)
MCHC RBC AUTO-ENTMCNC: 31.9 G/DL (ref 31.5–35.7)
MCV RBC AUTO: 90.6 FL (ref 79–97)
PLATELET # BLD AUTO: 237 10*3/MM3 (ref 140–450)
PMV BLD AUTO: 10 FL (ref 6–12)
POTASSIUM SERPL-SCNC: 4.8 MMOL/L (ref 3.5–5.2)
PROTHROMBIN TIME: 14.1 SECONDS (ref 11.7–14.2)
RBC # BLD AUTO: 4.15 10*6/MM3 (ref 4.14–5.8)
SODIUM SERPL-SCNC: 136 MMOL/L (ref 136–145)
TRIGL SERPL-MCNC: 101 MG/DL (ref 0–150)
VLDLC SERPL-MCNC: 19 MG/DL (ref 5–40)
WBC NRBC COR # BLD AUTO: 10.55 10*3/MM3 (ref 3.4–10.8)

## 2023-12-31 PROCEDURE — 25010000002 CEFAZOLIN IN DEXTROSE 2-4 GM/100ML-% SOLUTION: Performed by: ORTHOPAEDIC SURGERY

## 2023-12-31 PROCEDURE — 25010000002 FENTANYL CITRATE (PF) 50 MCG/ML SOLUTION: Performed by: ANESTHESIOLOGY

## 2023-12-31 PROCEDURE — 80061 LIPID PANEL: CPT | Performed by: ORTHOPAEDIC SURGERY

## 2023-12-31 PROCEDURE — 25010000002 SUGAMMADEX 200 MG/2ML SOLUTION: Performed by: ANESTHESIOLOGY

## 2023-12-31 PROCEDURE — 63710000001 INSULIN GLARGINE PER 5 UNITS: Performed by: ORTHOPAEDIC SURGERY

## 2023-12-31 PROCEDURE — C1776 JOINT DEVICE (IMPLANTABLE): HCPCS | Performed by: ORTHOPAEDIC SURGERY

## 2023-12-31 PROCEDURE — 25810000003 LACTATED RINGERS PER 1000 ML: Performed by: ANESTHESIOLOGY

## 2023-12-31 PROCEDURE — C1713 ANCHOR/SCREW BN/BN,TIS/BN: HCPCS | Performed by: ORTHOPAEDIC SURGERY

## 2023-12-31 PROCEDURE — 0SRR0J9 REPLACEMENT OF RIGHT HIP JOINT, FEMORAL SURFACE WITH SYNTHETIC SUBSTITUTE, CEMENTED, OPEN APPROACH: ICD-10-PCS | Performed by: ORTHOPAEDIC SURGERY

## 2023-12-31 PROCEDURE — 25010000002 MAGNESIUM SULFATE PER 500 MG OF MAGNESIUM: Performed by: ANESTHESIOLOGY

## 2023-12-31 PROCEDURE — 25010000002 HYDROMORPHONE 1 MG/ML SOLUTION: Performed by: INTERNAL MEDICINE

## 2023-12-31 PROCEDURE — 73501 X-RAY EXAM HIP UNI 1 VIEW: CPT

## 2023-12-31 PROCEDURE — 63710000001 INSULIN LISPRO (HUMAN) PER 5 UNITS: Performed by: STUDENT IN AN ORGANIZED HEALTH CARE EDUCATION/TRAINING PROGRAM

## 2023-12-31 PROCEDURE — 85027 COMPLETE CBC AUTOMATED: CPT | Performed by: ORTHOPAEDIC SURGERY

## 2023-12-31 PROCEDURE — 25010000002 HYDROMORPHONE PER 4 MG: Performed by: ANESTHESIOLOGY

## 2023-12-31 PROCEDURE — 25010000002 DEXAMETHASONE SODIUM PHOSPHATE 20 MG/5ML SOLUTION: Performed by: ANESTHESIOLOGY

## 2023-12-31 PROCEDURE — 80048 BASIC METABOLIC PNL TOTAL CA: CPT | Performed by: ORTHOPAEDIC SURGERY

## 2023-12-31 PROCEDURE — 25010000002 ONDANSETRON PER 1 MG: Performed by: ANESTHESIOLOGY

## 2023-12-31 PROCEDURE — 82948 REAGENT STRIP/BLOOD GLUCOSE: CPT

## 2023-12-31 PROCEDURE — 25810000003 SODIUM CHLORIDE 0.9 % SOLUTION: Performed by: ORTHOPAEDIC SURGERY

## 2023-12-31 PROCEDURE — 85610 PROTHROMBIN TIME: CPT | Performed by: ORTHOPAEDIC SURGERY

## 2023-12-31 DEVICE — IMPLANTABLE DEVICE: Type: IMPLANTABLE DEVICE | Site: ACETABULUM | Status: FUNCTIONAL

## 2023-12-31 DEVICE — VIOLET ANTIBACTERIAL POLYDIOXANONE, KNOTLESS TISSUE CONTROL DEVICE
Type: IMPLANTABLE DEVICE | Site: HIP | Status: FUNCTIONAL
Brand: STRATAFIX

## 2023-12-31 DEVICE — IMPLANTABLE DEVICE
Type: IMPLANTABLE DEVICE | Site: TROCHANTER | Status: FUNCTIONAL
Brand: VERSYS® ADVOCATE®

## 2023-12-31 DEVICE — IMPLANTABLE DEVICE
Type: IMPLANTABLE DEVICE | Site: ACETABULUM | Status: FUNCTIONAL
Brand: MULTIPOLAR®

## 2023-12-31 DEVICE — WHITE/BLUE CO-BRAID POLYETHYLENE SIZE 2 38" HC-5 NEEDLE BIOMET
Type: IMPLANTABLE DEVICE | Site: HIP | Status: FUNCTIONAL
Brand: TELEFLEX

## 2023-12-31 DEVICE — IMPLANTABLE DEVICE
Type: IMPLANTABLE DEVICE | Site: HIP | Status: FUNCTIONAL
Brand: BIOMET® BONE CEMENT R

## 2023-12-31 DEVICE — KNOTLESS TISSUE CONTROL DEVICE, UNDYED UNIDIRECTIONAL (ANTIBACTERIAL) SYNTHETIC ABSORBABLE DEVICE
Type: IMPLANTABLE DEVICE | Site: HIP | Status: FUNCTIONAL
Brand: STRATAFIX

## 2023-12-31 DEVICE — IMPLANTABLE DEVICE
Type: IMPLANTABLE DEVICE | Site: HIP | Status: FUNCTIONAL
Brand: VERSYS®

## 2023-12-31 RX ORDER — NALOXONE HCL 0.4 MG/ML
0.2 VIAL (ML) INJECTION AS NEEDED
Status: DISCONTINUED | OUTPATIENT
Start: 2023-12-31 | End: 2023-12-31 | Stop reason: HOSPADM

## 2023-12-31 RX ORDER — CEFAZOLIN SODIUM 2 G/100ML
2000 INJECTION, SOLUTION INTRAVENOUS EVERY 8 HOURS
Qty: 200 ML | Refills: 0 | Status: COMPLETED | OUTPATIENT
Start: 2023-12-31 | End: 2024-01-01

## 2023-12-31 RX ORDER — OXYCODONE HYDROCHLORIDE AND ACETAMINOPHEN 5; 325 MG/1; MG/1
1 TABLET ORAL ONCE AS NEEDED
Status: DISCONTINUED | OUTPATIENT
Start: 2023-12-31 | End: 2023-12-31 | Stop reason: HOSPADM

## 2023-12-31 RX ORDER — HYDROMORPHONE HYDROCHLORIDE 1 MG/ML
0.25 INJECTION, SOLUTION INTRAMUSCULAR; INTRAVENOUS; SUBCUTANEOUS
Status: DISCONTINUED | OUTPATIENT
Start: 2023-12-31 | End: 2023-12-31 | Stop reason: HOSPADM

## 2023-12-31 RX ORDER — PHENYLEPHRINE HCL IN 0.9% NACL 1 MG/10 ML
SYRINGE (ML) INTRAVENOUS AS NEEDED
Status: DISCONTINUED | OUTPATIENT
Start: 2023-12-31 | End: 2023-12-31 | Stop reason: SURG

## 2023-12-31 RX ORDER — DIPHENHYDRAMINE HYDROCHLORIDE 50 MG/ML
12.5 INJECTION INTRAMUSCULAR; INTRAVENOUS
Status: DISCONTINUED | OUTPATIENT
Start: 2023-12-31 | End: 2023-12-31 | Stop reason: HOSPADM

## 2023-12-31 RX ORDER — ENOXAPARIN SODIUM 100 MG/ML
40 INJECTION SUBCUTANEOUS EVERY 24 HOURS
Status: DISCONTINUED | OUTPATIENT
Start: 2024-01-01 | End: 2024-01-02

## 2023-12-31 RX ORDER — OXYCODONE AND ACETAMINOPHEN 7.5; 325 MG/1; MG/1
1 TABLET ORAL EVERY 4 HOURS PRN
Status: DISCONTINUED | OUTPATIENT
Start: 2023-12-31 | End: 2023-12-31 | Stop reason: HOSPADM

## 2023-12-31 RX ORDER — ONDANSETRON 2 MG/ML
4 INJECTION INTRAMUSCULAR; INTRAVENOUS ONCE AS NEEDED
Status: DISCONTINUED | OUTPATIENT
Start: 2023-12-31 | End: 2023-12-31 | Stop reason: HOSPADM

## 2023-12-31 RX ORDER — DEXMEDETOMIDINE HYDROCHLORIDE 100 UG/ML
INJECTION, SOLUTION INTRAVENOUS AS NEEDED
Status: DISCONTINUED | OUTPATIENT
Start: 2023-12-31 | End: 2023-12-31 | Stop reason: SURG

## 2023-12-31 RX ORDER — SODIUM CHLORIDE 0.9 % (FLUSH) 0.9 %
3 SYRINGE (ML) INJECTION EVERY 12 HOURS SCHEDULED
Status: DISCONTINUED | OUTPATIENT
Start: 2023-12-31 | End: 2023-12-31 | Stop reason: HOSPADM

## 2023-12-31 RX ORDER — FENTANYL CITRATE 50 UG/ML
25 INJECTION, SOLUTION INTRAMUSCULAR; INTRAVENOUS
Status: DISCONTINUED | OUTPATIENT
Start: 2023-12-31 | End: 2023-12-31 | Stop reason: HOSPADM

## 2023-12-31 RX ORDER — HYDRALAZINE HYDROCHLORIDE 20 MG/ML
5 INJECTION INTRAMUSCULAR; INTRAVENOUS
Status: DISCONTINUED | OUTPATIENT
Start: 2023-12-31 | End: 2023-12-31 | Stop reason: HOSPADM

## 2023-12-31 RX ORDER — CLOPIDOGREL BISULFATE 75 MG/1
75 TABLET ORAL DAILY
Status: DISCONTINUED | OUTPATIENT
Start: 2024-01-01 | End: 2024-01-03 | Stop reason: HOSPADM

## 2023-12-31 RX ORDER — DROPERIDOL 2.5 MG/ML
0.62 INJECTION, SOLUTION INTRAMUSCULAR; INTRAVENOUS
Status: DISCONTINUED | OUTPATIENT
Start: 2023-12-31 | End: 2023-12-31 | Stop reason: HOSPADM

## 2023-12-31 RX ORDER — LABETALOL HYDROCHLORIDE 5 MG/ML
5 INJECTION, SOLUTION INTRAVENOUS
Status: DISCONTINUED | OUTPATIENT
Start: 2023-12-31 | End: 2023-12-31 | Stop reason: HOSPADM

## 2023-12-31 RX ORDER — PROMETHAZINE HYDROCHLORIDE 25 MG/1
25 SUPPOSITORY RECTAL ONCE AS NEEDED
Status: DISCONTINUED | OUTPATIENT
Start: 2023-12-31 | End: 2023-12-31 | Stop reason: HOSPADM

## 2023-12-31 RX ORDER — IPRATROPIUM BROMIDE AND ALBUTEROL SULFATE 2.5; .5 MG/3ML; MG/3ML
3 SOLUTION RESPIRATORY (INHALATION) ONCE AS NEEDED
Status: DISCONTINUED | OUTPATIENT
Start: 2023-12-31 | End: 2023-12-31 | Stop reason: HOSPADM

## 2023-12-31 RX ORDER — FAMOTIDINE 10 MG/ML
20 INJECTION, SOLUTION INTRAVENOUS ONCE
Status: COMPLETED | OUTPATIENT
Start: 2023-12-31 | End: 2023-12-31

## 2023-12-31 RX ORDER — ONDANSETRON 2 MG/ML
INJECTION INTRAMUSCULAR; INTRAVENOUS AS NEEDED
Status: DISCONTINUED | OUTPATIENT
Start: 2023-12-31 | End: 2023-12-31 | Stop reason: SURG

## 2023-12-31 RX ORDER — FENTANYL CITRATE 50 UG/ML
50 INJECTION, SOLUTION INTRAMUSCULAR; INTRAVENOUS ONCE AS NEEDED
Status: DISCONTINUED | OUTPATIENT
Start: 2023-12-31 | End: 2023-12-31 | Stop reason: HOSPADM

## 2023-12-31 RX ORDER — SODIUM CHLORIDE, SODIUM LACTATE, POTASSIUM CHLORIDE, CALCIUM CHLORIDE 600; 310; 30; 20 MG/100ML; MG/100ML; MG/100ML; MG/100ML
9 INJECTION, SOLUTION INTRAVENOUS CONTINUOUS
Status: DISCONTINUED | OUTPATIENT
Start: 2023-12-31 | End: 2023-12-31

## 2023-12-31 RX ORDER — WARFARIN SODIUM 3 MG/1
3 TABLET ORAL
Status: DISCONTINUED | OUTPATIENT
Start: 2024-01-01 | End: 2024-01-03

## 2023-12-31 RX ORDER — INSULIN LISPRO 100 [IU]/ML
2-7 INJECTION, SOLUTION INTRAVENOUS; SUBCUTANEOUS
Status: DISCONTINUED | OUTPATIENT
Start: 2023-12-31 | End: 2024-01-03 | Stop reason: HOSPADM

## 2023-12-31 RX ORDER — FLUMAZENIL 0.1 MG/ML
0.2 INJECTION INTRAVENOUS AS NEEDED
Status: DISCONTINUED | OUTPATIENT
Start: 2023-12-31 | End: 2023-12-31 | Stop reason: HOSPADM

## 2023-12-31 RX ORDER — DEXAMETHASONE SODIUM PHOSPHATE 4 MG/ML
INJECTION, SOLUTION INTRA-ARTICULAR; INTRALESIONAL; INTRAMUSCULAR; INTRAVENOUS; SOFT TISSUE AS NEEDED
Status: DISCONTINUED | OUTPATIENT
Start: 2023-12-31 | End: 2023-12-31 | Stop reason: SURG

## 2023-12-31 RX ORDER — INSULIN LISPRO 100 [IU]/ML
2-7 INJECTION, SOLUTION INTRAVENOUS; SUBCUTANEOUS
Status: DISCONTINUED | OUTPATIENT
Start: 2023-12-31 | End: 2023-12-31

## 2023-12-31 RX ORDER — MIDAZOLAM HYDROCHLORIDE 1 MG/ML
0.5 INJECTION INTRAMUSCULAR; INTRAVENOUS
Status: DISCONTINUED | OUTPATIENT
Start: 2023-12-31 | End: 2023-12-31 | Stop reason: HOSPADM

## 2023-12-31 RX ORDER — FENTANYL CITRATE 50 UG/ML
INJECTION, SOLUTION INTRAMUSCULAR; INTRAVENOUS AS NEEDED
Status: DISCONTINUED | OUTPATIENT
Start: 2023-12-31 | End: 2023-12-31 | Stop reason: SURG

## 2023-12-31 RX ORDER — EPHEDRINE SULFATE 50 MG/ML
5 INJECTION, SOLUTION INTRAVENOUS ONCE AS NEEDED
Status: DISCONTINUED | OUTPATIENT
Start: 2023-12-31 | End: 2023-12-31 | Stop reason: HOSPADM

## 2023-12-31 RX ORDER — SODIUM CHLORIDE 0.9 % (FLUSH) 0.9 %
3-10 SYRINGE (ML) INJECTION AS NEEDED
Status: DISCONTINUED | OUTPATIENT
Start: 2023-12-31 | End: 2023-12-31 | Stop reason: HOSPADM

## 2023-12-31 RX ORDER — LIDOCAINE HYDROCHLORIDE 10 MG/ML
0.5 INJECTION, SOLUTION INFILTRATION; PERINEURAL ONCE AS NEEDED
Status: DISCONTINUED | OUTPATIENT
Start: 2023-12-31 | End: 2023-12-31 | Stop reason: HOSPADM

## 2023-12-31 RX ORDER — MAGNESIUM HYDROXIDE 1200 MG/15ML
LIQUID ORAL AS NEEDED
Status: DISCONTINUED | OUTPATIENT
Start: 2023-12-31 | End: 2023-12-31 | Stop reason: HOSPADM

## 2023-12-31 RX ORDER — MAGNESIUM SULFATE HEPTAHYDRATE 500 MG/ML
INJECTION, SOLUTION INTRAMUSCULAR; INTRAVENOUS AS NEEDED
Status: DISCONTINUED | OUTPATIENT
Start: 2023-12-31 | End: 2023-12-31 | Stop reason: SURG

## 2023-12-31 RX ORDER — PROMETHAZINE HYDROCHLORIDE 25 MG/1
25 TABLET ORAL ONCE AS NEEDED
Status: DISCONTINUED | OUTPATIENT
Start: 2023-12-31 | End: 2023-12-31 | Stop reason: HOSPADM

## 2023-12-31 RX ORDER — CEFAZOLIN SODIUM 2 G/100ML
2000 INJECTION, SOLUTION INTRAVENOUS ONCE
Status: COMPLETED | OUTPATIENT
Start: 2023-12-31 | End: 2023-12-31

## 2023-12-31 RX ADMIN — DEXAMETHASONE SODIUM PHOSPHATE 8 MG: 4 INJECTION, SOLUTION INTRAMUSCULAR; INTRAVENOUS at 07:54

## 2023-12-31 RX ADMIN — ATENOLOL 100 MG: 50 TABLET ORAL at 06:06

## 2023-12-31 RX ADMIN — FENTANYL CITRATE 25 MCG: 50 INJECTION, SOLUTION INTRAMUSCULAR; INTRAVENOUS at 08:14

## 2023-12-31 RX ADMIN — SUGAMMADEX 200 MG: 100 INJECTION, SOLUTION INTRAVENOUS at 09:10

## 2023-12-31 RX ADMIN — FENTANYL CITRATE 25 MCG: 50 INJECTION, SOLUTION INTRAMUSCULAR; INTRAVENOUS at 10:02

## 2023-12-31 RX ADMIN — HYDROMORPHONE HYDROCHLORIDE 0.25 MG: 1 INJECTION, SOLUTION INTRAMUSCULAR; INTRAVENOUS; SUBCUTANEOUS at 09:28

## 2023-12-31 RX ADMIN — SODIUM CHLORIDE 75 ML/HR: 9 INJECTION, SOLUTION INTRAVENOUS at 12:01

## 2023-12-31 RX ADMIN — LOSARTAN POTASSIUM 100 MG: 100 TABLET, FILM COATED ORAL at 12:00

## 2023-12-31 RX ADMIN — SENNOSIDES AND DOCUSATE SODIUM 2 TABLET: 50; 8.6 TABLET ORAL at 20:15

## 2023-12-31 RX ADMIN — INSULIN LISPRO 4 UNITS: 100 INJECTION, SOLUTION INTRAVENOUS; SUBCUTANEOUS at 21:50

## 2023-12-31 RX ADMIN — ATORVASTATIN CALCIUM 80 MG: 80 TABLET, FILM COATED ORAL at 12:00

## 2023-12-31 RX ADMIN — HYDROCODONE BITARTRATE AND ACETAMINOPHEN 1 TABLET: 5; 325 TABLET ORAL at 21:50

## 2023-12-31 RX ADMIN — GABAPENTIN 600 MG: 300 CAPSULE ORAL at 12:01

## 2023-12-31 RX ADMIN — CEFAZOLIN SODIUM 2000 MG: 2 INJECTION, SOLUTION INTRAVENOUS at 07:24

## 2023-12-31 RX ADMIN — SODIUM CHLORIDE, POTASSIUM CHLORIDE, SODIUM LACTATE AND CALCIUM CHLORIDE 9 ML/HR: 600; 310; 30; 20 INJECTION, SOLUTION INTRAVENOUS at 07:24

## 2023-12-31 RX ADMIN — GABAPENTIN 600 MG: 300 CAPSULE ORAL at 20:15

## 2023-12-31 RX ADMIN — Medication 100 MCG: at 08:29

## 2023-12-31 RX ADMIN — FENTANYL CITRATE 25 MCG: 50 INJECTION, SOLUTION INTRAMUSCULAR; INTRAVENOUS at 09:07

## 2023-12-31 RX ADMIN — FAMOTIDINE 20 MG: 10 INJECTION INTRAVENOUS at 07:24

## 2023-12-31 RX ADMIN — DEXMEDETOMIDINE HCL 4 MCG: 100 INJECTION INTRAVENOUS at 08:14

## 2023-12-31 RX ADMIN — HYDROMORPHONE HYDROCHLORIDE 1 MG: 1 INJECTION, SOLUTION INTRAMUSCULAR; INTRAVENOUS; SUBCUTANEOUS at 02:04

## 2023-12-31 RX ADMIN — ONDANSETRON 4 MG: 2 INJECTION INTRAMUSCULAR; INTRAVENOUS at 09:00

## 2023-12-31 RX ADMIN — FENTANYL CITRATE 25 MCG: 50 INJECTION, SOLUTION INTRAMUSCULAR; INTRAVENOUS at 09:57

## 2023-12-31 RX ADMIN — AMLODIPINE BESYLATE 5 MG: 5 TABLET ORAL at 12:00

## 2023-12-31 RX ADMIN — PANTOPRAZOLE SODIUM 40 MG: 40 TABLET, DELAYED RELEASE ORAL at 12:01

## 2023-12-31 RX ADMIN — CEFAZOLIN SODIUM 2000 MG: 2 INJECTION, SOLUTION INTRAVENOUS at 14:04

## 2023-12-31 RX ADMIN — HYDROCODONE BITARTRATE AND ACETAMINOPHEN 1 TABLET: 5; 325 TABLET ORAL at 13:01

## 2023-12-31 RX ADMIN — MAGNESIUM SULFATE HEPTAHYDRATE 1 G: 500 INJECTION, SOLUTION INTRAMUSCULAR; INTRAVENOUS at 07:54

## 2023-12-31 RX ADMIN — Medication 1 TABLET: at 12:00

## 2023-12-31 RX ADMIN — SENNOSIDES AND DOCUSATE SODIUM 2 TABLET: 50; 8.6 TABLET ORAL at 12:01

## 2023-12-31 RX ADMIN — INSULIN GLARGINE 10 UNITS: 100 INJECTION, SOLUTION SUBCUTANEOUS at 21:50

## 2023-12-31 RX ADMIN — HYDROMORPHONE HYDROCHLORIDE 0.25 MG: 1 INJECTION, SOLUTION INTRAMUSCULAR; INTRAVENOUS; SUBCUTANEOUS at 09:37

## 2023-12-31 RX ADMIN — DEXMEDETOMIDINE HCL 10 MCG: 100 INJECTION INTRAVENOUS at 08:09

## 2023-12-31 NOTE — OP NOTE
HIP ENDOPROSTHESIS  Procedure Note    Humble Rios  12/31/2023    Pre-op Diagnosis: Right hip femoral neck fracture.   Post-op Diagnosis: Same  Procedure: Right hip bipolar hemiarthroplasty  Surgeon:  Francisco Pederson MD  Assistant: Pillo Dougherty CFA  Anesthesia: General, Anesthesiologist: Quentin Leal MD  Staff: Circulator: Angelia Wolf RN  Scrub Person: Jovita Escobar  Vendor Representative: Huey Thomas CFA  Estimated Blood Loss: 100ml  Specimens: * No orders in the log *  Drains: none  Complications: None    Components Utilized:    Implant Name Type Inv. Item Serial No.  Lot No. LRB No. Used Action   SUT NONABS MAXBRAID/PE NMBR2 HC5 38IN ROBERT [id number] - [id number] Implant SUT NONABS MAXBRAID/PE NMBR2 HC5 38IN ROBERT [id number]  [employer] [id number] Right 2 Implanted   CMT BONE R 1X40 - [id number] Implant CMT BONE R 1X40  [employer] [id number] Right 1 Implanted   CMT BONE R 1X40 - [id number] Implant CMT BONE R 1X40  [employer] [id number] Right 1 Implanted   DEV CONTRL TISS STRATAFIXSPIRALMNCRYL PLSPS2 REV3/0 45CM - [id number] Implant DEV CONTRL TISS STRATAFIXSPIRALMNCRYL PLSPS2 REV3/0 45CM  [employer]  [employer] [id number] Right 1 Implanted   DEV CONTRL TISS STRATAFIX SYMM PDS PLUS STEPHANI CT-1 45CM - [id number] Implant DEV CONTRL TISS STRATAFIX SYMM PDS PLUS STEPHANI CT-1 45CM  [employer]  DIV OF J [employer] J [id number] Right 1 Implanted   CENTRLZR VERSYS DIST 9MM - [id number] Implant CENTRLZR VERSYS DIST 9MM  [employer] [id number] Right 1 Implanted   STEM FEM/HIP VERSYS ADVOCATE NONVLIGN STD/OFFST SZ11 120MM - [id number] Implant STEM FEM/HIP VERSYS ADVOCATE NONVLIGN STD/OFFST SZ11 120MM  [employer] [id number] Right 1 Implanted   SHLL HIP MULTIPOLAR MTL 51MM - [id number] Implant SHLL HIP MULTIPOLAR MTL 51MM  [employer] [id number] Right 1 Implanted   LINER VERSYS ASMBL BIPOL 50/51/52MM OD X28MM - [id number] Implant LINER VERSYS ASMBL BIPOL 50/51/52MM OD X28MM  [employer] [id number] Right 1  Implanted   HD FEM/HIP UNIV COCR 12/14TPR 28MM PLS3.5 - [id number] Implant HD FEM/HIP UNIV COCR 12/14TPR 28MM PLS3.5  [employer] [id number] Right 1 Implanted       Indication for Procedure:  This patient is a 78 y.o. male who fell off of a ladder landing on his right hip resulting in a displaced right femoral neck fracture.  This occurred 2 days prior.  The patient needed cardiac clearance with a cardiac catheterization yesterday.  He was felt to be stable for surgical intervention.  Based on his x-rays and CT scan is felt he would benefit from a bipolar hemiarthroplasty.  Surgical options and non-surgical options were discussed in detail and to the patient's satisfaction.  Surgical intervention was recommended based on the patient's injury and functional status.      The risks and benefits of surgery were discussed with patient and informed consent was obtained.  Risks include but are not limited to, infection, bleeding, nerve injury, blood clots, risks associated with anesthesia, need for further surgery, persistent pain, and possibly death.    Protocols for intravenous antibiotics and venous thrombosis were followed for this patient.  IV antibiotics were infused prior to surgery and will be discontinued within 24 hours of completion of the surgical procedure.       DESCRIPTION OF PROCEDURE:     The patient was seen in Preoperative Holding Area where his right hip surgical site was marked. Preoperative antibiotics were received. H&P and consent updated.  He was taken to the Operating Room and provided general anesthesia on the Operating Room table.  He was carefully moved to the left lateral decubitus position.  Right hip was prepped and draped in typical sterile fashion.  Timeout performed confirm the correct surgical site and procedure.  Standard posterior approach to the hip was performed.  Incision was taken down through skin and subcutaneous tissues.  IT band was split in line with the incision including the  fascia.  The patient had a prior greater trochanteric bursectomy x 2.  Evidence of scar tissue was noted.  The hip was internally rotated.  Shoulder external rotators and capsule were released.  This was tagged with 2 #2 max braid sutures.  Fracture site was identified.  Retractors were placed.  Femoral neck cut was made with a saw.  Bone was removed.  Tuning fork was utilized to remove the femoral head.  It was sized to a 51 mm diameter shell.  Trial shell was reduced and noted to be appropriate for the patient.    Attention was placed on the femur.  Box osteotome was utilized followed by the canal finder and lateralizing reamer.  Patient was noted to have a significantly narrowed canal.  An 11 mm broach was fully seated and noted to be stable.  Trial reduction was performed.  The implants were removed.  The canal was then prepared for cementing.  It was irrigated, brushed, and suctioned dry.  Cement restrictor had been placed.  Cement was mixed on the back table.  It was then injected into the canal and packed using my thumb.  The stem was then fully impacted with careful attention anteversion.  After 11 minutes the cement had fully hardened.  Trial reduction was performed with a +3.5 mm neck length bipolar construct.  It was stable and leg lengths were symmetric.    The final +3.5 mm bipolar construct was impacted.  It was reduced.  It was stable.  The wound was copiously irrigated with 3 L normal saline containing Betadine using the pulse lavage system.  The short external rotators and capsulotomy was repaired back with the #2 max braid sutures.  0 Vicryl suture was also utilized in the repair.  The IT band and fascia were repaired with 0 Vicryl suture and a #1 STRATAFIX suture.  Skin was closed with 2-0 Vicryl suture and a 3-0 running subcuticular strata fix suture.  Exofin, Telfa, Tegaderm, and abduction pillow were placed.  Sponge and needle counts were appropriate.  Patient was taken to the PACU  postoperatively in stable condition.    Postoperative Plan:  Protocols for intravenous antibiotics and venous thrombosis were followed for this patient.  IV antibiotics were infused prior to surgery and will be discontinued within 24 hours of completion of the surgical procedure.  Thrombosis prophylaxis will be initiated within 24 hours of the completion of the surgical procedure.  The patient will be weight bearing as tolerated with posterior hip precautions.    Pillo Dougherty CFA assisted for the entire surgical procedure.  He was necessary for retraction, implant placement, and closure of the incision.    No complications were encountered during the surgical procedure.    Francisco Pederson MD    Additional Notes: Patient consent was obtained to proceed with the visit and recommended plan of care after discussion of all risks and benefits, including the risks of COVID-19 exposure. Detail Level: Simple

## 2023-12-31 NOTE — PLAN OF CARE
Goal Outcome Evaluation:  Plan of Care Reviewed With: patient, spouse, family        Progress: no change  Outcome Evaluation: Patient has been alert with confusion throughout day. Returned from PACU late in morning follwoing hip surgery. Dressing is C/D/I to right hip with orders to keep dressing in place. Ice applied as needed. Abduction pillow in place upon arrival back to floor. Weight bearing as tolerated with hip precaution order in place. Appetite and fluid intake adequate. Prakash catheter was removed per protocol. DTV by 10pm, urinal at bedside. Wife has assisted with feeding, with patient eating and drinking well. New order for s/s insulin to be started. IVF d/c'd. IV abt continue. No bm this shift. Vital signs stable. Call light in reach. Safety maintained.

## 2023-12-31 NOTE — PROGRESS NOTES
Name: Humble Rios ADMIT: 2023   : 1945  PCP: Dereck Miramontes MD    MRN: [id number] LOS: 3 days   AGE/SEX: 78 y.o. male  ROOM: [facility]/     Subjective   Subjective   No acute events overnight.  [facility] completed yesterday and no stents were needed.  Patient went to surgery today.  He was seen postoperatively and states that he is feeling well.  Right lower extremity pain as a 6 out of 10.    Objective   Objective     Vital Signs  Temp:  [98.1 °F (36.7 °C)-100 °F (37.8 °C)] 99.3 °F (37.4 °C)  Heart Rate:  [62-95] 62  Resp:  [12-18] 16  BP: (148-188)/(54-76) 148/54  SpO2:  [92 %-98 %] 94 %  on  Flow (L/min):  [2-4] 2;   Device (Oxygen Therapy): nasal cannula  Body mass index is 24.27 kg/m².    Physical Exam  General: Sitting up in bed, no acute distress.  Comfortable appearing.  ENT: No conjunctival injection or scleral icterus. Moist mucous membranes.   Neuro: Eyes open and moving in all directions, strength exam deferred due to injury, no focal deficits.   Lungs: Clear to auscultation bilaterally. No wheeze or crackles. No distress.   Heart: RRR, no murmurs.   Abdomen: Soft, non-tender, non-distended. Normal bowel sounds.   Ext: Right lower extremity with surgical incision dressing in place, bruising noted.  Tender to palpation but thigh is soft.  Skin: No rashes or lesions. IV site without swelling or erythema.     Results Review     I reviewed the patient's new clinical results:  Results from last 7 days   Lab Units 23  1137 23  04223  1538   WBC 10*3/mm3 10.55 8.59 8.22 9.42   HEMOGLOBIN g/dL 12.0* 11.3* 11.3* 11.4*   PLATELETS 10*3/mm3 237 221 239 269     Results from last 7 days   Lab Units 23  1137 23  0421 23  0523  1538   SODIUM mmol/L 136 138 139 141   POTASSIUM mmol/L 4.8 4.7 4.4 4.6   CHLORIDE mmol/L 101 105 106 107   CO2 mmol/L 20.4* 24.4 24.6 25.0   BUN mg/dL 29* 22 22 28*   CREATININE mg/dL 2.39* 2.32* 2.25* 2.45*   GLUCOSE  mg/dL 150* 143* 137* 146*   EGFR mL/min/1.73 27.1* 28.1* 29.1* 26.3*       Results from last 7 days   Lab Units 12/31/23  1137 12/30/23  0421 12/29/23  0522 12/28/23  1538   CALCIUM mg/dL 8.9 8.3* 8.1* 8.4*       Hemoglobin A1C   Date/Time Value Ref Range Status   12/30/2023 0421 8.70 (H) 4.80 - 5.60 % Final     Glucose   Date/Time Value Ref Range Status   12/31/2023 1111 150 (H) 70 - 130 mg/dL Final   12/31/2023 1005 131 (H) 70 - 130 mg/dL Final   12/31/2023 0601 109 70 - 130 mg/dL Final   12/30/2023 2042 152 (H) 70 - 130 mg/dL Final   12/30/2023 1643 129 70 - 130 mg/dL Final   12/30/2023 1025 94 70 - 130 mg/dL Final   12/30/2023 0736 98 70 - 130 mg/dL Final       XR Hip 1 View Without Pelvis Right (Surgery Only)    Result Date: 12/31/2023   Postsurgical changes.    This report was finalized on 12/31/2023 10:23 AM by Dr. James Lu M.D on Workstation: [id number]       I have personally reviewed all medications:  Scheduled Medications  amLODIPine, 5 mg, Oral, Daily  atenolol, 100 mg, Oral, Daily  atorvastatin, 80 mg, Oral, Daily  ceFAZolin, 2,000 mg, Intravenous, Q8H  [START ON 1/1/2024] clopidogrel, 75 mg, Oral, Daily  [START ON 1/1/2024] enoxaparin, 40 mg, Subcutaneous, Q24H  gabapentin, 600 mg, Oral, BID  insulin glargine, 10 Units, Subcutaneous, Nightly  losartan, 100 mg, Oral, Daily  multivitamin with minerals, 1 tablet, Oral, Daily  pantoprazole, 40 mg, Oral, QAM AC  senna-docusate sodium, 2 tablet, Oral, BID  tamsulosin, 0.4 mg, Oral, BID  [START ON 1/1/2024] warfarin, 3 mg, Oral, Daily    Infusions  [START ON 1/1/2024] Pharmacy to dose warfarin,   sodium chloride, 75 mL/hr, Last Rate: 75 mL/hr (12/31/23 1201)    Diet  Diet: Cardiac Diets, Diabetic Diets; Healthy Heart (2-3 Na+); Consistent Carbohydrate; Texture: Regular Texture (IDDSI 7); Fluid Consistency: Thin (IDDSI 0)    Assessment/Plan     Active Hospital Problems    Diagnosis  POA    **Closed displaced fracture of right femoral neck [S72.001A]   Yes    CKD (chronic kidney disease) [N18.9]  Unknown    Atrial fibrillation [I48.91]  Unknown    HTN (hypertension) [I10]  Unknown    CHF (congestive heart failure) [I50.9]  Unknown    Anemia [D64.9]  Unknown    Type 2 diabetes mellitus [E11.9]  Unknown    PVD (peripheral vascular disease) [I73.9]  Unknown      Resolved Hospital Problems   No resolved problems to display.       78 y.o. male with Closed displaced fracture of right femoral neck.    Right femoral neck fracture  -XR hip with acute transcervical right femoral neck fracture  -Orthopedic surgery consulted and following, appreciate their assistance  -CT lower extremity with acute right femoral neck fracture with impaction and varus angulation  -Day of Surgery from right hip bipolar hemiarthroplasty   -PT/OT consults, weightbearing per orthopedic surgery  -Pain control and bowel regimen    CKD  -Creatinine 2.45 on arrival, stable at 2.39 today  -Seems consistent with patient's baseline  -Avoid nephrotoxic agents including NSAIDs and contrast dyes  -Monitor with daily BMP  -If creatinine worsens from baseline, will ask nephrology to evaluate  -Discontinue IV fluids today    Atrial fibrillation  HTN  CHF  -Continue amlodipine, atenolol  -Hold Lasix for now  -Checked EKG preoperatively  -Echocardiogram with EF 66.2%, mild concentric hypertrophy, grade 1 diastolic dysfunction  -Cardiology performed [facility] on 12/30, no stents needed  -BP high in the OR, but seems better now.  Did not receive his morning amlodipine until late.  Monitor for now, but can increase amlodipine to 10 mg if needed.  -Resume warfarin when cleared from orthopedic surgery, INR 1.08 today    Anemia  -Hemoglobin 11.4 on arrival, consistent with baseline; improved to 12 today but expected to drop postoperatively  -No signs of active bleeding, but at risk with fracture  -Monitor with daily CBC, transfuse for Hgb less than 7    Type 2 diabetes mellitus  -Hemoglobin A1c 8.7  -Hold home oral  agents  -Continue Lantus and SSI  -Titrate insulin doses as needed based on blood glucose levels    PVD  -Hold clopidogrel for now, restart postoperatively when okay from orthopedic surgery standpoint  -Confirmed Plavix is due to PVD, no cardiac stents placed previous    SCDs for DVT prophylaxis.  Full code.  Discussed with patient, family, and nursing staff.  Anticipate discharge TBD      Jovita Granado MD  [facility]  12/31/23  14:13 EST

## 2023-12-31 NOTE — PLAN OF CARE
Goal Outcome Evaluation:              Outcome Evaluation: Norco and Dilaudid given for pain. bedrest. NPO after midnight. Maintain safety and continue to monitor.

## 2023-12-31 NOTE — ANESTHESIA POSTPROCEDURE EVALUATION
"Patient: Humble Rios    Procedure Summary       Date: 12/31/23 Room / Location: [facility] OR [address] [place] [address] MAIN OR    Anesthesia Start: 0730 Anesthesia Stop: 0924    Procedure: HIP ENDOPROSTHESIS (Right: Hip) Diagnosis:     Surgeons: Francisco Pederson MD Provider: Quentin Leal MD    Anesthesia Type: general ASA Status: 3            Anesthesia Type: general    Vitals  Vitals Value Taken Time   /68 12/31/23 1015   Temp 37.4 °C (99.3 °F) 12/31/23 0922   Pulse 70 12/31/23 1024   Resp 14 12/31/23 1020   SpO2 96 % 12/31/23 1024   Vitals shown include unfiled device data.        Post Anesthesia Care and Evaluation    Patient location during evaluation: bedside  Patient participation: complete - patient participated  Level of consciousness: awake  Pain management: adequate    Airway patency: patent  Anesthetic complications: No anesthetic complications  PONV Status: controlled  Cardiovascular status: acceptable  Respiratory status: acceptable  Hydration status: acceptable    Comments: /54 (BP Location: Right arm, Patient Position: Lying)   Pulse 62   Temp 37.4 °C (99.3 °F) (Oral)   Resp 16   Ht 180.3 cm (71\")   Wt 78.9 kg (174 lb)   SpO2 94%   BMI 24.27 kg/m²       "

## 2023-12-31 NOTE — PROGRESS NOTES
[facility]: Warfarin Dosing/Monitoring Consult    Humble Rios is a 78 y.o. male, estimated creatinine clearance is 29.3 mL/min (A) (by C-G formula based on SCr of 2.32 mg/dL (H)). weighing 78.9 kg (174 lb).    Results from last 7 days   Lab Units 12/30/23  0421 12/29/23  0844 12/29/23  0522 12/28/23  1538   INR  1.40* 2.14*  --  2.18*   HEMOGLOBIN g/dL 11.3*  --  11.3* 11.4*   HEMATOCRIT % 34.9*  --  34.1* 34.6*   PLATELETS 10*3/mm3 221  --  239 269     Prior to admission anticoagulation: per [facility] anticoag visit note on 12/18/23: warfarin 3 mg x6 days per week, SMTWRF, 1.5mg on Saturday.     Hospital Anticoagulation:  Consulting provider: Dr. Pederson  Start date: on warfarin PTA  Indication: A Fib - requiring full anticoagulation  Target INR: 2 - 3  Expected duration: indefinite   Bridge Therapy: Yes  with enoxaparin prophylactic dose -- to continue until INR between 2-3    Potential food or drug interactions:   Cefazolin-- periop prophylaxis only-- may increase warfarin sensitivity.   One dose phytonadione 5mg given 12/29 for warfarin reversal prior to surgery    Education complete?/Date: No, pt on warfarin PTA    Assessment/Plan:  Dose: Plan warfarin 3mg daily beginning tomorrow as per MD order.   Monitor for any signs or symptoms of bleeding  Follow up daily INRs and dose adjustments    Pharmacy will continue to follow until discharge or discontinuation of warfarin.     Bev Moore, PharmD  Clinical Pharmacist

## 2023-12-31 NOTE — ANESTHESIA PROCEDURE NOTES
Airway  Date/Time: 12/31/2023 7:42 AM  Airway not difficult    General Information and Staff    Anesthesiologist: Quentin Leal MD    Indications and Patient Condition    Preoxygenated: yes  Mask difficulty assessment: 2 - vent by mask + OA or adjuvant +/- NMBA    Final Airway Details  Final airway type: endotracheal airway      Successful airway: ETT  Cuffed: yes   Successful intubation technique: direct laryngoscopy  Facilitating devices/methods: intubating stylet and anterior pressure/BURP  Endotracheal tube insertion site: oral  Blade: Dinh  Blade size: 3  ETT size (mm): 7.5  Cormack-Lehane Classification: grade IIb - view of arytenoids or posterior of glottis only  Placement verified by: chest auscultation and capnometry   Measured from: teeth  ETT/EBT  to teeth (cm): 22  Number of attempts at approach: 1  Assessment: lips, teeth, and gum same as pre-op and atraumatic intubation    Additional Comments  Easy mask ventilation  Easy atraumatic intubation but would likely be more difficult with MAC blade.

## 2023-12-31 NOTE — ANESTHESIA PREPROCEDURE EVALUATION
" Anesthesia Evaluation     Patient summary reviewed and Nursing notes reviewed   history of anesthetic complications:  difficult airway  NPO Solid Status: > 8 hours  NPO Liquid Status: > 2 hours           Airway   Mallampati: III  Neck ROM: limited  Possible difficult intubation  Dental    (+) partials    Pulmonary    (+) pneumonia , a smoker Former, COPD, asthma,sleep apnea    ROS comment: Positive FARA screen/Positive FARA diagnosis and 2 or more mitigating factors used (recovery in non-supine position and multimodal analgesia)    Cardiovascular   Exercise tolerance: poor (<4 METS)    PT is on anticoagulation therapy  Rhythm: irregular    (+) hypertension, dysrhythmias Atrial Fib, CHF , PVD, hyperlipidemia      Neuro/Psych  (+) TIA  GI/Hepatic/Renal/Endo    (+) GERD, liver disease fatty liver disease, renal disease- CRI, diabetes mellitus    Musculoskeletal     Abdominal    Substance History - negative use     OB/GYN negative ob/gyn ROS         Other   arthritis,   history of cancer                  Anesthesia Plan    ASA 3     general     (/70   Pulse 63   Temp 36.7 °C (98.1 °F) (Oral)   Resp 16   Ht 180.3 cm (71\")   Wt 78.9 kg (174 lb)   SpO2 93%   BMI 24.27 kg/m²     I have reviewed the patient's history with the patient and the chart, including all pertinent laboratory results and imaging. I have explained the risks of anesthesia including but not limited to dental damage, corneal abrasion, nerve injury, MI, stroke, and death.    )  intravenous induction     Anesthetic plan, risks, benefits, and alternatives have been provided, discussed and informed consent has been obtained with: patient and spouse/significant other.      CODE STATUS:    Code Status (Patient has no pulse and is not breathing): CPR (Attempt to Resuscitate)  Medical Interventions (Patient has pulse or is breathing): Full Support      "

## 2023-12-31 NOTE — PROGRESS NOTES
Orthopedic Progress Note    Subjective :   Patient resting in bed.  Notes some left-sided neck pain.  Hip is sore but no changes.    Objective :    Vital signs in last 24 hours:  Temp:  [98.1 °F (36.7 °C)-100 °F (37.8 °C)] 98.1 °F (36.7 °C)  Heart Rate:  [63-95] 71  Resp:  [10-16] 16  BP: (148-182)/(69-91) 170/69  Vitals:    12/30/23 2313 12/31/23 0500 12/31/23 0606 12/31/23 0708   BP: 164/72 178/70 178/70 170/69   BP Location:    Left arm   Patient Position:    Lying   Pulse: 69 63 63 71   Resp: 16 16  16   Temp: 98.8 °F (37.1 °C) 98.1 °F (36.7 °C)     TempSrc: Oral Oral     SpO2:    92%   Weight:       Height:           PHYSICAL EXAM:  Patient is calm, in no acute distress, awake and oriented x 3.  Examination of the right lower extremity reveals it is shortened and externally rotated.  Pain with logroll.  Foot warm and well-perfused.  Normal motor and sensory exam.    LABS:  Results from last 7 days   Lab Units 12/30/23  0421   WBC 10*3/mm3 8.59   HEMOGLOBIN g/dL 11.3*   HEMATOCRIT % 34.9*   PLATELETS 10*3/mm3 221     Results from last 7 days   Lab Units 12/30/23  0421   SODIUM mmol/L 138   POTASSIUM mmol/L 4.7   CHLORIDE mmol/L 105   CO2 mmol/L 24.4   BUN mg/dL 22   CREATININE mg/dL 2.32*   GLUCOSE mg/dL 143*   CALCIUM mg/dL 8.3*     Results from last 7 days   Lab Units 12/30/23  0421 12/29/23  0844 12/28/23  1538   INR  1.40* 2.14* 2.18*       ASSESSMENT:  Right femoral neck fracture    Plan:  Patient cleared after cardiac catheterization yesterday to proceed with right hip bipolar hemiarthroplasty.  Risks and benefits discussed with the patient and family.  They wish to proceed.  All questions answered.    Francisco Pederson MD    Date: 12/31/2023  Time: 07:19 EST

## 2024-01-01 LAB
ANION GAP SERPL CALCULATED.3IONS-SCNC: 12.5 MMOL/L (ref 5–15)
BUN SERPL-MCNC: 41 MG/DL (ref 8–23)
BUN/CREAT SERPL: 14.5 (ref 7–25)
CALCIUM SPEC-SCNC: 8.9 MG/DL (ref 8.6–10.5)
CHLORIDE SERPL-SCNC: 100 MMOL/L (ref 98–107)
CO2 SERPL-SCNC: 22.5 MMOL/L (ref 22–29)
CREAT SERPL-MCNC: 2.83 MG/DL (ref 0.76–1.27)
DEPRECATED RDW RBC AUTO: 47.8 FL (ref 37–54)
EGFRCR SERPLBLD CKD-EPI 2021: 22.1 ML/MIN/1.73
ERYTHROCYTE [DISTWIDTH] IN BLOOD BY AUTOMATED COUNT: 14.1 % (ref 12.3–15.4)
GLUCOSE BLDC GLUCOMTR-MCNC: 190 MG/DL (ref 70–130)
GLUCOSE BLDC GLUCOMTR-MCNC: 240 MG/DL (ref 70–130)
GLUCOSE BLDC GLUCOMTR-MCNC: 256 MG/DL (ref 70–130)
GLUCOSE BLDC GLUCOMTR-MCNC: 295 MG/DL (ref 70–130)
GLUCOSE SERPL-MCNC: 205 MG/DL (ref 65–99)
HCT VFR BLD AUTO: 33.8 % (ref 37.5–51)
HGB BLD-MCNC: 11.1 G/DL (ref 13–17.7)
INR PPP: 1.06 (ref 0.9–1.1)
MCH RBC QN AUTO: 30 PG (ref 26.6–33)
MCHC RBC AUTO-ENTMCNC: 32.8 G/DL (ref 31.5–35.7)
MCV RBC AUTO: 91.4 FL (ref 79–97)
PLATELET # BLD AUTO: 239 10*3/MM3 (ref 140–450)
PMV BLD AUTO: 10.1 FL (ref 6–12)
POTASSIUM SERPL-SCNC: 4.8 MMOL/L (ref 3.5–5.2)
PROTHROMBIN TIME: 13.9 SECONDS (ref 11.7–14.2)
RBC # BLD AUTO: 3.7 10*6/MM3 (ref 4.14–5.8)
SODIUM SERPL-SCNC: 135 MMOL/L (ref 136–145)
WBC NRBC COR # BLD AUTO: 10.22 10*3/MM3 (ref 3.4–10.8)

## 2024-01-01 PROCEDURE — 97162 PT EVAL MOD COMPLEX 30 MIN: CPT

## 2024-01-01 PROCEDURE — 85610 PROTHROMBIN TIME: CPT | Performed by: ORTHOPAEDIC SURGERY

## 2024-01-01 PROCEDURE — 63710000001 INSULIN GLARGINE PER 5 UNITS: Performed by: ORTHOPAEDIC SURGERY

## 2024-01-01 PROCEDURE — 97530 THERAPEUTIC ACTIVITIES: CPT

## 2024-01-01 PROCEDURE — 85027 COMPLETE CBC AUTOMATED: CPT | Performed by: ORTHOPAEDIC SURGERY

## 2024-01-01 PROCEDURE — 80048 BASIC METABOLIC PNL TOTAL CA: CPT | Performed by: ORTHOPAEDIC SURGERY

## 2024-01-01 PROCEDURE — 82948 REAGENT STRIP/BLOOD GLUCOSE: CPT

## 2024-01-01 PROCEDURE — 63710000001 INSULIN LISPRO (HUMAN) PER 5 UNITS: Performed by: STUDENT IN AN ORGANIZED HEALTH CARE EDUCATION/TRAINING PROGRAM

## 2024-01-01 PROCEDURE — 25010000002 ENOXAPARIN PER 10 MG: Performed by: ORTHOPAEDIC SURGERY

## 2024-01-01 PROCEDURE — 25010000002 CEFAZOLIN IN DEXTROSE 2-4 GM/100ML-% SOLUTION: Performed by: ORTHOPAEDIC SURGERY

## 2024-01-01 RX ADMIN — CEFAZOLIN SODIUM 2000 MG: 2 INJECTION, SOLUTION INTRAVENOUS at 00:40

## 2024-01-01 RX ADMIN — INSULIN LISPRO 3 UNITS: 100 INJECTION, SOLUTION INTRAVENOUS; SUBCUTANEOUS at 12:31

## 2024-01-01 RX ADMIN — TAMSULOSIN HYDROCHLORIDE 0.4 MG: 0.4 CAPSULE ORAL at 22:08

## 2024-01-01 RX ADMIN — SENNOSIDES AND DOCUSATE SODIUM 2 TABLET: 50; 8.6 TABLET ORAL at 22:08

## 2024-01-01 RX ADMIN — Medication 1 TABLET: at 09:48

## 2024-01-01 RX ADMIN — INSULIN LISPRO 4 UNITS: 100 INJECTION, SOLUTION INTRAVENOUS; SUBCUTANEOUS at 17:03

## 2024-01-01 RX ADMIN — ENOXAPARIN SODIUM 40 MG: 100 INJECTION SUBCUTANEOUS at 09:47

## 2024-01-01 RX ADMIN — HYDROCODONE BITARTRATE AND ACETAMINOPHEN 1 TABLET: 5; 325 TABLET ORAL at 23:56

## 2024-01-01 RX ADMIN — HYDROCODONE BITARTRATE AND ACETAMINOPHEN 1 TABLET: 5; 325 TABLET ORAL at 07:57

## 2024-01-01 RX ADMIN — SENNOSIDES AND DOCUSATE SODIUM 2 TABLET: 50; 8.6 TABLET ORAL at 09:48

## 2024-01-01 RX ADMIN — INSULIN LISPRO 4 UNITS: 100 INJECTION, SOLUTION INTRAVENOUS; SUBCUTANEOUS at 22:07

## 2024-01-01 RX ADMIN — AMLODIPINE BESYLATE 5 MG: 5 TABLET ORAL at 09:48

## 2024-01-01 RX ADMIN — INSULIN GLARGINE 10 UNITS: 100 INJECTION, SOLUTION SUBCUTANEOUS at 22:07

## 2024-01-01 RX ADMIN — PANTOPRAZOLE SODIUM 40 MG: 40 TABLET, DELAYED RELEASE ORAL at 06:49

## 2024-01-01 RX ADMIN — GABAPENTIN 600 MG: 300 CAPSULE ORAL at 22:08

## 2024-01-01 RX ADMIN — GABAPENTIN 600 MG: 300 CAPSULE ORAL at 09:48

## 2024-01-01 RX ADMIN — HYDROCODONE BITARTRATE AND ACETAMINOPHEN 1 TABLET: 5; 325 TABLET ORAL at 12:36

## 2024-01-01 RX ADMIN — LOSARTAN POTASSIUM 100 MG: 100 TABLET, FILM COATED ORAL at 09:48

## 2024-01-01 RX ADMIN — TAMSULOSIN HYDROCHLORIDE 0.4 MG: 0.4 CAPSULE ORAL at 09:48

## 2024-01-01 RX ADMIN — INSULIN LISPRO 2 UNITS: 100 INJECTION, SOLUTION INTRAVENOUS; SUBCUTANEOUS at 07:43

## 2024-01-01 RX ADMIN — ATORVASTATIN CALCIUM 80 MG: 80 TABLET, FILM COATED ORAL at 09:48

## 2024-01-01 RX ADMIN — CLOPIDOGREL BISULFATE 75 MG: 75 TABLET, FILM COATED ORAL at 09:48

## 2024-01-01 RX ADMIN — HYDROCODONE BITARTRATE AND ACETAMINOPHEN 1 TABLET: 5; 325 TABLET ORAL at 18:58

## 2024-01-01 RX ADMIN — WARFARIN 3 MG: 3 TABLET ORAL at 17:04

## 2024-01-01 RX ADMIN — ATENOLOL 100 MG: 50 TABLET ORAL at 09:48

## 2024-01-01 NOTE — THERAPY EVALUATION
Patient Name: Humble Rios  : 1945    MRN: [id number]                              Today's Date: 2024       Admit Date: 2023    Visit Dx:     ICD-10-CM ICD-9-CM   1. Closed displaced fracture of right femoral neck  S72.001A 820.8   2. Chronic kidney disease, unspecified CKD stage  N18.9 585.9   3. Chronic anticoagulation  Z79.01 V58.61     Patient Active Problem List   Diagnosis    Closed displaced fracture of right femoral neck    CKD (chronic kidney disease)    Atrial fibrillation    HTN (hypertension)    CHF (congestive heart failure)    Anemia    Type 2 diabetes mellitus    PVD (peripheral vascular disease)     Past Medical History:   Diagnosis Date    Abrasion     BACK OF LEFT LEG, SCABBED OVER, HEALING    Anesthesia complication     WIFE STATES HE NEEDS CHILD ET TUBE DUE TO A CURVATURE OF HIS TRACHEA    Arthritis     Asthma     Atrial fibrillation     Bladder cancer     COPD (chronic obstructive pulmonary disease)     Diabetes mellitus     Diverticulitis     Enlarged prostate     Fatty liver     GERD (gastroesophageal reflux disease)     Hard to intubate     Hyperlipidemia     Hypertension     Pneumonia     Seasonal allergies     Skin cancer     Sleep apnea     NO CPAP MACHINE    Stage 4 chronic kidney disease     TIA (transient ischemic attack)      Past Surgical History:   Procedure Laterality Date    CARDIAC CATHETERIZATION      CERVICAL FUSION ANTERIOR WITH ARTIFICIAL DISCECTOMY IMPLANTATION      CHOLECYSTECTOMY      COLONOSCOPY      CYSTOSCOPY BLADDER BIOPSY      CYSTOSCOPY BLADDER BIOPSY Bilateral 2023    Procedure: CYSTOSCOPY BILATERAL RETROGRADE PYELOGRAM AND BLADDER BIOPSY;  Surgeon: Kong Maurer MD;  Location: [facility];  Service: Urology;  Laterality: Bilateral;    HEMORRHOIDECTOMY      X 2    HIP SURGERY Right     X 2 FOR BURSA REMOVAL    INGUINAL HERNIA REPAIR Right     LUMBAR DECOMPRESSION      LUMBAR LAMINECTOMY      TOE AMPUTATION Left     GREAT TOE       General Information       Row Name 01/01/24 1312          Physical Therapy Time and Intention    Document Type evaluation  -KA     Mode of Treatment individual therapy;physical therapy  -KA       Row Name 01/01/24 1312          General Information    Patient Profile Reviewed yes  -KA     Prior Level of Function independent:;ADL's;gait  -KA     Existing Precautions/Restrictions fall;hip, posterior  -KA     Barriers to Rehab medically complex  -KA       Row Name 01/01/24 1312          Living Environment    People in Home spouse  -KA       Row Name 01/01/24 1312          Home Main Entrance    Number of Stairs, Main Entrance two  -KA     Stair Railings, Main Entrance none  -KA       Row Name 01/01/24 1312          Stairs Within Home, Primary    Number of Stairs, Within Home, Primary none  -KA       Row Name 01/01/24 1312          Cognition    Orientation Status (Cognition) oriented x 3  -KA       Row Name 01/01/24 1312          Safety Issues, Functional Mobility    Safety Issues Affecting Function (Mobility) awareness of need for assistance;insight into deficits/self-awareness;safety precaution awareness;safety precautions follow-through/compliance  -KA     Impairments Affecting Function (Mobility) balance;endurance/activity tolerance;pain;strength;range of motion (ROM)  -KA               User Key  (r) = Recorded By, (t) = Taken By, (c) = Cosigned By      Initials Name Provider Type    Brenda De Leon, JUSTINA Physical Therapist                   Mobility       Row Name 01/01/24 1314          Bed Mobility    Bed Mobility supine-sit;sit-supine;scooting/bridging  -KA     Scooting/Bridging [place] (Bed Mobility) moderate assist (50% patient effort);2 person assist  -KA     Supine-Sit [place] (Bed Mobility) moderate assist (50% patient effort);2 person assist  -KA     Sit-Supine [place] (Bed Mobility) maximum assist (25% patient effort);moderate assist (50% patient effort);2 person assist  -KA      Assistive Device (Bed Mobility) bed rails;draw sheet;head of bed elevated  -       Row Name 01/01/24 1314          Bed-Chair Transfer    Bed-Chair [place] (Transfers) not tested  -       Row Name 01/01/24 1314          Sit-Stand Transfer    Sit-Stand [place] (Transfers) moderate assist (50% patient effort)  -     Assistive Device (Sit-Stand Transfers) walker, front-wheeled  -       Row Name 01/01/24 1314          Gait/Stairs (Locomotion)    [place] Level (Gait) moderate assist (50% patient effort);2 person assist  -     Assistive Device (Gait) walker, front-wheeled  -     Distance in Feet (Gait) 5 sides steps right to HOB  -     Deviations/Abnormal Patterns (Gait) beba decreased;gait speed decreased;weight shifting decreased;stride length decreased;antalgic  -     [place] Level (Stairs) not tested  -     Comment, (Gait/Stairs) Pt unable to take forward steps due to poor weight shifting ability, able to perform small side steps to HOB with max encouragement, assist moving RW  -       Row Name 01/01/24 1314          Mobility    Extremity Weight-bearing Status right lower extremity  -     Right Lower Extremity (Weight-bearing Status) weight-bearing as tolerated (WBAT)  -               User Key  (r) = Recorded By, (t) = Taken By, (c) = Cosigned By      Initials Name Provider Type    Brenda De Leon, PT Physical Therapist                   Obj/Interventions       Row Name 01/01/24 1317          Strength Comprehensive (MMT)    Comment, General Manual Muscle Testing (MMT) Assessment RLE strength grossly 3-/5 to 3/5, LLE strength 3/5  -[facility] Name 01/01/24 1317          Motor Skills    Therapeutic Exercise other (see comments)  AP, LAQ, Seated march at EOB 10 reps  -       Row Name 01/01/24 1317          Balance    Balance Assessment sitting static balance;sitting dynamic balance;standing static balance;standing dynamic balance  -     Static Sitting Balance  standby assist  -KA     Dynamic Sitting Balance contact guard  -KA     Position, Sitting Balance unsupported;sitting edge of bed  -KA     Static Standing Balance minimal assist;2-person assist;moderate assist  -KA     Dynamic Standing Balance 2-person assist;moderate assist  -KA     Position/Device Used, Standing Balance walker, rolling  -KA               User Key  (r) = Recorded By, (t) = Taken By, (c) = Cosigned By      Initials Name Provider Type    Brenda De Leon, PT Physical Therapist                   Goals/Plan       Row Name 01/01/24 1324          Bed Mobility Goal 1 (PT)    Activity/Assistive Device (Bed Mobility Goal 1, PT) bed mobility activities, all  -KA     [place] Level/Cues Needed (Bed Mobility Goal 1, PT) standby assist;verbal cues required  -KA     Time Frame (Bed Mobility Goal 1, PT) 2 weeks  -       Row Name 01/01/24 1324          Transfer Goal 1 (PT)    Activity/Assistive Device (Transfer Goal 1, PT) transfers, all  -KA     [place] Level/Cues Needed (Transfer Goal 1, PT) contact guard required  -KA     Time Frame (Transfer Goal 1, PT) 2 weeks  -       Row Name 01/01/24 1324          Gait Training Goal 1 (PT)    Activity/Assistive Device (Gait Training Goal 1, PT) gait (walking locomotion);assistive device use;decrease fall risk;diminish gait deviation;forward stepping;improve balance and speed;increase endurance/gait distance;increase energy conservation;normalize weight shifts;walker, rolling  -KA     [place] Level (Gait Training Goal 1, PT) contact guard required  -KA     Distance (Gait Training Goal 1, PT) 50 ft  -KA     Time Frame (Gait Training Goal 1, PT) 2 weeks  -       Row Name 01/01/24 1324          Therapy Assessment/Plan (PT)    Planned Therapy Interventions (PT) balance training;bed mobility training;gait training;home exercise program;strengthening;ROM (range of motion);stair training;patient/family education;transfer training  -               User Key   (r) = Recorded By, (t) = Taken By, (c) = Cosigned By      Initials Name Provider Type    Brenda De Leon, PT Physical Therapist                   Clinical Impression       Row Name 01/01/24 8803          Pain    Pretreatment Pain Rating 3/10  -     Posttreatment Pain Rating 3/10  -     Pain Location - Side/Orientation Left  -     Pain Location - hip  -     Pre/Posttreatment Pain Comment Pain increased to 10/10 with movement, but decreased when back in bed.  -     Pain Intervention(s) Repositioned;Ambulation/increased activity;Nursing Notified  -       Row Name 01/01/24 1143          Plan of Care Review    Plan of Care Reviewed With patient;spouse  -     Progress improving  -     Outcome Evaluation Pt evaluated by PT this AM, POD1 s/p R hip bipolar hemiarthroplasty.  Prior to admission, he was IADLs and ambulating without AD.  Today, he requires mod A-max A x 2 for bed mobility, mod A for STS, and mod A to side step right to HOB.  Unable to take forward steps at this time as he is unable to tolerate pain with weight shifting.  He is limited with function by pain and weakness.  Needs frequent cues to maintain hip precautions during transitions.  Pt will benefit from skilled physical therapy to improve strength, mobility, transfers, and gait to return to [facility].  At this time, recommend SNF or inpatient rehab at discharge.  -       Row Name 01/01/24 7248          Therapy Assessment/Plan (PT)    Rehab Potential (PT) good, to achieve stated therapy goals  -     Criteria for Skilled Interventions Met (PT) yes;meets criteria;skilled treatment is necessary  -     Therapy Frequency (PT) 6 times/wk  -     Predicted Duration of Therapy Intervention (PT) 2 weeks  -       Row Name 01/01/24 4290          Vital Signs    O2 Delivery Pre Treatment room air  -KA     O2 Delivery Intra Treatment room air  -KA     O2 Delivery Post Treatment room air  -KA     Pre Patient Position Supine  -     Intra Patient  Position Standing  -KA     Post Patient Position Supine  -KA       Row Name 01/01/24 1319          Positioning and Restraints    Pre-Treatment Position in bed  -KA     Post Treatment Position bed  -KA     In Bed notified nsg;supine;call light within reach;encouraged to call for assist;exit alarm on;with family/caregiver;pillow between legs  -               User Key  (r) = Recorded By, (t) = Taken By, (c) = Cosigned By      Initials Name Provider Type    Brenda De Leon PT Physical Therapist                   Outcome Measures       Row Name 01/01/24 1326 01/01/24 1325       How much help from another person do you currently need...    Turning from your back to your side while in flat bed without using bedrails? 2  -KA 2  -KA    Moving from lying on back to sitting on the side of a flat bed without bedrails? 2  -KA 2  -KA    Moving to and from a bed to a chair (including a wheelchair)? 2  -KA 2  -KA    Standing up from a chair using your arms (e.g., wheelchair, bedside chair)? 2  -KA 2  -KA    Climbing 3-5 steps with a railing? 1  -KA 2  -KA    To walk in hospital room? 1  -KA 2  -KA    AM-PAC 6 Clicks Score (PT) 10  -KA 12  -KA    Highest Level of Mobility Goal 4 --> Transfer to chair/commode  -KA 4 --> Transfer to chair/commode  -KA      Row Name 01/01/24 1325          Functional Assessment    Outcome Measure Options AM-PAC 6 Clicks Basic Mobility (PT)  -               User Key  (r) = Recorded By, (t) = Taken By, (c) = Cosigned By      Initials Name Provider Type    Brenda De Leon PT Physical Therapist                                 Physical Therapy Education       Title: PT OT SLP Therapies (Done)       Topic: Physical Therapy (Done)       Point: Mobility training (Done)       Learning Progress Summary             Patient Acceptance, E, VU by TOYA at 1/1/2024 1326                                         User Key       Initials Effective Dates Name Provider Type Discipline    TOYA 08/24/21 -  Santy  Brenda, PT Physical Therapist PT                  PT Recommendation and Plan  Planned Therapy Interventions (PT): balance training, bed mobility training, gait training, home exercise program, strengthening, ROM (range of motion), stair training, patient/family education, transfer training  Plan of Care Reviewed With: patient, spouse  Progress: improving  Outcome Evaluation: Pt evaluated by PT this AM, POD1 s/p R hip bipolar hemiarthroplasty.  Prior to admission, he was IADLs and ambulating without AD.  Today, he requires mod A-max A x 2 for bed mobility, mod A for STS, and mod A to side step right to HOB.  Unable to take forward steps at this time as he is unable to tolerate pain with weight shifting.  He is limited with function by pain and weakness.  Needs frequent cues to maintain hip precautions during transitions.  Pt will benefit from skilled physical therapy to improve strength, mobility, transfers, and gait to return to [facility].  At this time, recommend SNF or inpatient rehab at discharge.     Time Calculation:         PT Charges       Row Name 01/01/24 1327             Time Calculation    Start Time 1108  -KA      Stop Time 1134  -KA      Time Calculation (min) 26 min  -KA      PT Received On 01/01/24  -KA      PT - Next Appointment 01/02/24  -KA      PT Goal Re-Cert Due Date 01/15/24  -KA         Time Calculation- PT    Total Timed Code Minutes- PT 23 minute(s)  -KA         Timed Charges    42071 - PT Therapeutic Activity Minutes 23  -KA         Total Minutes    Timed Charges Total Minutes 23  -KA       Total Minutes 23  -KA                User Key  (r) = Recorded By, (t) = Taken By, (c) = Cosigned By      Initials Name Provider Type    Brenda De Leon PT Physical Therapist                  Therapy Charges for Today       Code Description Service Date Service Provider Modifiers Qty    24461601816  PT THERAPEUTIC ACT EA 15 MIN 1/1/2024 Brenda Madera, PT GP 2    [id number]  PT EVAL MOD  COMPLEXITY 2 1/1/2024 Brenda Madera, PT GP 1            PT G-Codes  Outcome Measure Options: AM-PAC 6 Clicks Basic Mobility (PT)  AM-PAC 6 Clicks Score (PT): 10  PT Discharge Summary  Anticipated Discharge Disposition (PT): inpatient rehabilitation facility, skilled nursing facility    Brenda Madera, PT  1/1/2024

## 2024-01-01 NOTE — PROGRESS NOTES
"Patient Care Team:  Dereck Miramontes MD as PCP - General  Dereck Miramontes MD as PCP - Family Medicine    Sub: No chest pain today     Objective   Vital Signs  Temp:  [97.3 °F (36.3 °C)-99.3 °F (37.4 °C)] 98.1 °F (36.7 °C)  Heart Rate:  [62-73] 71  Resp:  [12-18] 16  BP: (139-188)/(54-76) 139/59    Intake/Output Summary (Last 24 hours) at 1/1/2024 0629  Last data filed at 1/1/2024 0525  Gross per 24 hour   Intake 1380 ml   Output 1400 ml   Net -20 ml     Flowsheet Rows      Flowsheet Row First Filed Value   Admission Height 181.6 cm (71.5\") Documented at 12/29/2023 0500   Admission Weight 78.9 kg (174 lb) Documented at 12/29/2023 0500            Physical Exam:   General Appearance:    Alert, cooperative, in no acute distress   Lungs:     Clear to auscultation,respirations regular, even and    unlabored    Heart:    Regular rhythm and normal rate, normal S1 and S2, no  murmur, no gallop, no rub, no click   Chest Wall:    No abnormalities observed   Abdomen:     Normal bowel sounds, no masses, no organomegaly, soft    non-tender, non-distended, no guarding, no rebound                tenderness   Extremities:   no edema, no cyanosis, no  redness     Results Review:    ECG 12 Lead Pre-Op / Pre-Procedure   Final Result   HEART RATE= 59  bpm   RR Interval= 1017  ms   IL Interval= 194  ms   P Horizontal Axis= -13  deg   P Front Axis= 45  deg   QRSD Interval= 104  ms   QT Interval= 394  ms   QTcB= 391  ms   QRS Axis= -59  deg   T Wave Axis= 30  deg   - ABNORMAL ECG -   Sinus rhythm   RSR' in V1 or V2, probably normal variant   Inferior infarct, old   Borderline ST elevation, anterolateral leads   No Prior Tracing for Comparison   Electronically Signed By: Desi Trevino ([facility]) 29-Dec-2023 12:36:05   Date and Time of Study: 2023-12-29 10:50:42      SCANNED - TELEMETRY     Final Result      SCANNED - TELEMETRY     Final Result      SCANNED - TELEMETRY     Final Result      SCANNED - TELEMETRY     Final Result      SCANNED - " TELEMETRY     Final Result      SCANNED - TELEMETRY     Final Result      SCANNED - TELEMETRY     Final Result      SCANNED - TELEMETRY     Final Result      SCANNED - TELEMETRY     Final Result      SCANNED - TELEMETRY     Final Result      SCANNED - TELEMETRY     Final Result      SCANNED - TELEMETRY     Final Result      SCANNED - TELEMETRY     Final Result        Results for orders placed during the hospital encounter of 12/28/23    Adult Transthoracic Echo Complete w/ Color, Spectral and Contrast if Necessary Per Protocol    Interpretation Summary    Left ventricular systolic function is normal. Calculated left ventricular EF = 66.2%    Left ventricular wall thickness is consistent with mild concentric hypertrophy.    Left ventricular diastolic function is consistent with (grade Ia w/high LAP) impaired relaxation.    Estimated right ventricular systolic pressure from tricuspid regurgitation is mildly elevated (35-45 mmHg). Calculated right ventricular systolic pressure from tricuspid regurgitation is 38 mmHg.    Results for orders placed during the hospital encounter of 12/28/23    Cardiac Catheterization/Vascular Study (Needs Review)  This result has not been signed. Information might be incomplete.    Narrative  :  Zofia Phillips MD    Procedures performed:  1.  Left heart catheterization.  2.  Left ventriculography  3.  Selective native coronary angiography    Indications:  Unstable angina      Description of the procedure:  Patient was brought to the cardiac catheter was explained the risk and benefit and alternatives of the procedure. Patient signed informed consent, was prepped and draped in sterile fashion for right radial artery access.  Using micropuncture needle and modified Seldinger technique a 5/6 [place] sheath was advanced into the right radial artery.  JL3.5 and JR4 catheter was used to engage the left and right coronary artery respectively.  A JR 4 catheter was used to cross the aortic  valve and perform a left heart catheterization and LV gram.    All exchanges were done over the wire.  Hemostasis was achieved by manual compression / TR band.  Moderate sedation 15 minutes.    No complications noted.    Findings:  1.  Left heart catheterization: LVEDP 7 mmHg. No gradient across the AV valve.    2.  Left ventriculography:  LV function normal.  LVEF 60-65%.    3.  Selective native coronary angiography:  A. Left main: short vessel and quickly bifurcates into LAD and left circumflex. No angiographic disease noted in left main.  B. LAD: Large caliber vessel gives rise to 1 prominent diagonal branch.  Mid LAD 20-30% stenosis.  Distal LAD 20% stenosis.  C. Left circumflex: Large-caliber vessel gives rise to 3 obtuse marginal branches.  Mid circumflex has 30% stenosis.  Obtuse marginal branches with luminal irregularities not more than 10% stenosis.  D. Right coronary artery: Large-caliber dominant vessel distally divides into RPDA and RPLV.  Right coronary artery with diffuse 20% stenosis.      Conclusion:  1.  Preserved LVEF and normal LVEDP.  2.  Mild nonobstructive coronary artery disease.      Recommendations:  Aggressive risk factor modification.  Medical management.      Zofia Phillips M.D    Results from last 7 days   Lab Units 01/01/24  0435   SODIUM mmol/L 135*   POTASSIUM mmol/L 4.8   CHLORIDE mmol/L 100   CO2 mmol/L 22.5   BUN mg/dL 41*   CREATININE mg/dL 2.83*   GLUCOSE mg/dL 205*   CALCIUM mg/dL 8.9         Results from last 7 days   Lab Units 01/01/24  0435 12/31/23  1137 12/30/23  0421   WBC 10*3/mm3 10.22 10.55 8.59   HEMOGLOBIN g/dL 11.1* 12.0* 11.3*   HEMATOCRIT % 33.8* 37.6 34.9*   PLATELETS 10*3/mm3 239 237 221     Results from last 7 days   Lab Units 01/01/24  0435 12/31/23  1137 12/30/23  0421   INR  1.06 1.08 1.40*         Results from last 7 days   Lab Units 12/31/23  1137   CHOLESTEROL mg/dL 132   TRIGLYCERIDES mg/dL 101   HDL CHOL mg/dL 44   LDL CHOL mg/dL 69       amLODIPine, 5  mg, Oral, Daily  atenolol, 100 mg, Oral, Daily  atorvastatin, 80 mg, Oral, Daily  clopidogrel, 75 mg, Oral, Daily  enoxaparin, 40 mg, Subcutaneous, Q24H  gabapentin, 600 mg, Oral, BID  insulin glargine, 10 Units, Subcutaneous, Nightly  insulin lispro, 2-7 Units, Subcutaneous, 4x Daily AC & at Bedtime  losartan, 100 mg, Oral, Daily  multivitamin with minerals, 1 tablet, Oral, Daily  pantoprazole, 40 mg, Oral, QAM AC  senna-docusate sodium, 2 tablet, Oral, BID  tamsulosin, 0.4 mg, Oral, BID  warfarin, 3 mg, Oral, Daily      Pharmacy to dose warfarin,           I reviewed the patient's new clinical results.  I personally viewed and interpreted the patient's EKG/Telemetry data    Assessment/Plan:  Closed displaced fracture of right femoral neck  HTN  HLD  DM  PVD  Paroxysmal atrial fibrillation : LVEF normal    Mild nonobstructive CAD     Patient underwent cardiac cath for unstable angina symptoms: [facility] with mild nonobstructive CAD. LVEF and LVEDP normal   Continue Beta blocker and anti HTN medications / amlodipine     He remains at low risk for perioperative cardiac event during orthopaedic surgery. Currently well optimized from volume status. Tele stable. No sings of overt ischemia or heart failure.   Continue current medications.   Resume anticoagulation after surgery as per orthopaedic surgery.     Interval plan patient with nonobstructive CAD continue current medical management we will follow perioperatively.    Madhu Jensen MD  01/01/24  06:29 EST

## 2024-01-01 NOTE — PLAN OF CARE
Goal Outcome Evaluation:   Plan of care reviewed with client and spouse.  Pain controlled with prn pain meds given.  PT in to see today, see notes.  Up to chair this afternoon for 1 1/2 hours then back to bed, assist of 2 with use of walker.  Appetite good.  Confused to place and time upon awakening from afternoon nap.  Voiding per urinal.  Pillow roll between knees as client has difficulty using urinal with ABD pillow. Safety maintained.  Continue to monitor.

## 2024-01-01 NOTE — PROGRESS NOTES
[facility] Clinical Pharmacy Services: Warfarin Dosing/Monitoring Consult    Humble Rios is a 78 y.o. male, estimated creatinine clearance is 24 mL/min (A) (by C-G formula based on SCr of 2.83 mg/dL (H)). weighing 78.9 kg (174 lb).    Results from last 7 days   Lab Units 01/01/24  0435 12/31/23  1137 12/30/23  0421 12/29/23  0844 12/29/23  0522 12/28/23  1538   INR  1.06 1.08 1.40* 2.14*  --  2.18*   HEMOGLOBIN g/dL 11.1* 12.0* 11.3*  --  11.3* 11.4*   HEMATOCRIT % 33.8* 37.6 34.9*  --  34.1* 34.6*   PLATELETS 10*3/mm3 239 237 221  --  239 269     Prior to admission anticoagulation: per [facility] anticoag visit note on 12/18/23: warfarin 3 mg x6 days per week, [facility], 1.5mg on Saturday.     Hospital Anticoagulation:  Consulting provider: Dr. Pederson  Start date: on warfarin PTA  Indication: A Fib - requiring full anticoagulation  Target INR: 2 - 3  Expected duration: indefinite   Bridge Therapy: Yes  with enoxaparin prophylactic dose -- to continue until INR between 2-3    Potential food or drug interactions:   Cefazolin-- periop prophylaxis only-- may increase warfarin sensitivity.   One dose phytonadione 5mg given 12/29 for warfarin reversal prior to surgery    Education complete?/Date: No, pt on warfarin PTA    Assessment/Plan:  INR subtherapeutic. Therapy had been held several days and PO vitamin K given to allow for surgical intervention. Warfarin 3 mg daily for now. May benefit from a modest bolus dose pending warfarin sensitivity/ INR trend.      Monitor for any signs or symptoms of bleeding  Follow up daily INRs and dose adjustments    Pharmacy will continue to follow until discharge or discontinuation of warfarin.     Cheyenne Gowers, [facility]  Clinical Pharmacist

## 2024-01-01 NOTE — PROGRESS NOTES
Orthopedic Progress Note    Subjective :   Resting in bed.  He was up in chair for a while and tolerated well.  Right hip pain improving.  Generalized weakness noted.    Objective :    Vital signs in last 24 hours:  Temp:  [97.3 °F (36.3 °C)-98.1 °F (36.7 °C)] 97.9 °F (36.6 °C)  Heart Rate:  [58-71] 58  Resp:  [16] 16  BP: (131-171)/(50-69) 131/50  Vitals:    12/31/23 2119 12/31/23 2312 01/01/24 0727 01/01/24 1334   BP: 146/60 139/59 171/69 131/50   BP Location: Right arm Right arm Right arm Right arm   Patient Position: Lying Lying Lying Lying   Pulse: 68 71  58   Resp: 16 16 16    Temp: 97.3 °F (36.3 °C) 98.1 °F (36.7 °C) 97.9 °F (36.6 °C)    TempSrc: Oral Oral Oral Oral   SpO2: 91% 96% 90% 95%   Weight:       Height:           PHYSICAL EXAM:  Patient is calm, in no acute distress, awake and oriented x 3.  Dressing clean, dry and intact to the right hip.  No signs of infection.  Swelling is appropriate.  Ecchymosis is appropriate in amount.  Homans test is negative.  Patient is neurovascularly intact distally.      LABS:  Results from last 7 days   Lab Units 01/01/24  0435   WBC 10*3/mm3 10.22   HEMOGLOBIN g/dL 11.1*   HEMATOCRIT % 33.8*   PLATELETS 10*3/mm3 239     Results from last 7 days   Lab Units 01/01/24  0435   SODIUM mmol/L 135*   POTASSIUM mmol/L 4.8   CHLORIDE mmol/L 100   CO2 mmol/L 22.5   BUN mg/dL 41*   CREATININE mg/dL 2.83*   GLUCOSE mg/dL 205*   CALCIUM mg/dL 8.9     Results from last 7 days   Lab Units 01/01/24  0435 12/31/23  1137 12/30/23  0421   INR  1.06 1.08 1.40*       ASSESSMENT:  Status post right hip bipolar hemiarthroplasty, postoperative day 1.    Plan:  Continue Physical Therapy, weightbearing as tolerated with right hip posterior precautions.  Continue SCDs, Continue Lovenox/Coumadin for DVT prophylaxis.  Dispo planning for home versus rehab when medically stable.    Patient will follow-up with me in 2 weeks.  We will follow peripherally.  Please call with questions.    Francisco  MD Bg    Date: 1/1/2024  Time: 14:41 EST

## 2024-01-01 NOTE — PLAN OF CARE
Goal Outcome Evaluation:  Plan of Care Reviewed With: patient, spouse        Progress: improving  Outcome Evaluation: Pt evaluated by PT this AM, POD1 s/p R hip bipolar hemiarthroplasty.  Prior to admission, he was IADLs and ambulating without AD.  Today, he requires mod A-max A x 2 for bed mobility, mod A for STS, and mod A to side step right to HOB.  Unable to take forward steps at this time as he is unable to tolerate pain with weight shifting.  He is limited with function by pain and weakness.  Needs frequent cues to maintain hip precautions during transitions.  Pt will benefit from skilled physical therapy to improve strength, mobility, transfers, and gait to return to PLOF.  At this time, recommend SNF or inpatient rehab at discharge.      Anticipated Discharge Disposition (PT): inpatient rehabilitation facility, skilled nursing facility

## 2024-01-01 NOTE — PROGRESS NOTES
"    DAILY PROGRESS NOTE  [facility]    Patient Identification:  Name: Humble Rios  Age: 78 y.o.  Sex: male  :  1945  MRN: [id number]         Primary Care Physician: Dereck Miramontes MD    Subjective:  Interval History: Tolerated procedure well.  Postoperative pain in the hip is better.  He denies any chest pain troubles breathing nausea vomiting or fevers    Objective: Family x 1 at bedside.  Case discussed amongst all.    Scheduled Meds:amLODIPine, 5 mg, Oral, Daily  atenolol, 100 mg, Oral, Daily  atorvastatin, 80 mg, Oral, Daily  clopidogrel, 75 mg, Oral, Daily  enoxaparin, 40 mg, Subcutaneous, Q24H  gabapentin, 600 mg, Oral, BID  insulin glargine, 10 Units, Subcutaneous, Nightly  insulin lispro, 2-7 Units, Subcutaneous, 4x Daily AC & at Bedtime  losartan, 100 mg, Oral, Daily  multivitamin with minerals, 1 tablet, Oral, Daily  pantoprazole, 40 mg, Oral, QAM AC  senna-docusate sodium, 2 tablet, Oral, BID  tamsulosin, 0.4 mg, Oral, BID  warfarin, 3 mg, Oral, Daily      Continuous Infusions:Pharmacy to dose warfarin,         Vital signs in last 24 hours:  Temp:  [97.3 °F (36.3 °C)-98.1 °F (36.7 °C)] 97.9 °F (36.6 °C)  Heart Rate:  [62-71] 71  Resp:  [16] 16  BP: (139-171)/(54-69) 171/69    Intake/Output:    Intake/Output Summary (Last 24 hours) at 2024 1145  Last data filed at 2024 0900  Gross per 24 hour   Intake 480 ml   Output 1600 ml   Net -1120 ml       Exam:  /69 (BP Location: Right arm, Patient Position: Lying)   Pulse 71   Temp 97.9 °F (36.6 °C) (Oral)   Resp 16   Ht 180.3 cm (71\")   Wt 78.9 kg (174 lb)   SpO2 90%   BMI 24.27 kg/m²     General Appearance:    Alert, cooperative, nontoxic, AAOx3                          Head:    Normocephalic, without obvious abnormality, atraumatic                         Throat:   Oral mucosa pink and moist                           Neck:   Supple, symmetrical, trachea midline, no JVD                         Lungs:    Clear to " auscultation bilaterally, respirations unlabored                 Chest Wall:    No tenderness or deformity                          Heart:    Regular rate and rhythm, S1 and S2 normal                  Abdomen:     Soft, nontender, bowel sounds active                 Extremities: Postoperative right hip, no cyanosis or edema                        Pulses:   Pulses palpable in lower extremities                               Data Review:  Labs in chart were reviewed.    Assessment:  Active Hospital Problems    Diagnosis  POA    **Closed displaced fracture of right femoral neck [S72.001A]  Yes    CKD (chronic kidney disease) [N18.9]  Unknown    Atrial fibrillation [I48.91]  Unknown    HTN (hypertension) [I10]  Unknown    CHF (congestive heart failure) [I50.9]  Unknown    Anemia [D64.9]  Unknown    Type 2 diabetes mellitus [E11.9]  Unknown    PVD (peripheral vascular disease) [I73.9]  Unknown      Resolved Hospital Problems   No resolved problems to display.       Plan:    Right femoral neck fracture status post bipolar Pantera plasty per  on 12/31/2023   -Coumadin resumed so no additional prophylaxis warranted INR -1.06 with pharmacy dosing and also on Lovenox prophylactically until INR becomes therapeutic      DM2 with CKD 4   -A1c 8.7 -continue basal/bolus   -Creatinine essentially at baseline   -If needed consider resumption of glimepiride pending trends    Anemia of chronic disease compounded by acute blood loss anemia postoperative state   -Hgb 11.4 at admission.  Currently 11.1 which is reassuring    PAF/HTN/diastolic CHF   -Can resume Lasix in a.m. if needed daily (home MAR states as needed)   -Cards [facility] on 12/30/2023 with no stents placed   -No postoperative hypotension noticed    PVD on Plavix    HLD on statin    GERD on PPI    BPH on Flomax    Thaddeus Freedman MD  1/1/2024  11:45 EST

## 2024-01-01 NOTE — PLAN OF CARE
Goal Outcome Evaluation:  Plan of Care Reviewed With: patient, spouse     VSS. A/o x4. R hip dressing cdi. Abduction pillow in use. Urinating via urinal. Prn norco for pain. Wife at bedside.      Progress: improving

## 2024-01-02 LAB
ANION GAP SERPL CALCULATED.3IONS-SCNC: 10 MMOL/L (ref 5–15)
BUN SERPL-MCNC: 42 MG/DL (ref 8–23)
BUN/CREAT SERPL: 13.8 (ref 7–25)
CALCIUM SPEC-SCNC: 8.7 MG/DL (ref 8.6–10.5)
CHLORIDE SERPL-SCNC: 99 MMOL/L (ref 98–107)
CO2 SERPL-SCNC: 25 MMOL/L (ref 22–29)
CREAT SERPL-MCNC: 3.05 MG/DL (ref 0.76–1.27)
DEPRECATED RDW RBC AUTO: 44.9 FL (ref 37–54)
EGFRCR SERPLBLD CKD-EPI 2021: 20.2 ML/MIN/1.73
ERYTHROCYTE [DISTWIDTH] IN BLOOD BY AUTOMATED COUNT: 14.1 % (ref 12.3–15.4)
GLUCOSE BLDC GLUCOMTR-MCNC: 164 MG/DL (ref 70–130)
GLUCOSE BLDC GLUCOMTR-MCNC: 186 MG/DL (ref 70–130)
GLUCOSE BLDC GLUCOMTR-MCNC: 210 MG/DL (ref 70–130)
GLUCOSE BLDC GLUCOMTR-MCNC: 234 MG/DL (ref 70–130)
GLUCOSE SERPL-MCNC: 155 MG/DL (ref 65–99)
HCT VFR BLD AUTO: 31.2 % (ref 37.5–51)
HGB BLD-MCNC: 10.1 G/DL (ref 13–17.7)
INR PPP: 1.14 (ref 0.9–1.1)
MCH RBC QN AUTO: 28.5 PG (ref 26.6–33)
MCHC RBC AUTO-ENTMCNC: 32.4 G/DL (ref 31.5–35.7)
MCV RBC AUTO: 88.1 FL (ref 79–97)
PLATELET # BLD AUTO: 228 10*3/MM3 (ref 140–450)
PMV BLD AUTO: 10.3 FL (ref 6–12)
POTASSIUM SERPL-SCNC: 4.4 MMOL/L (ref 3.5–5.2)
PROTHROMBIN TIME: 14.7 SECONDS (ref 11.7–14.2)
RBC # BLD AUTO: 3.54 10*6/MM3 (ref 4.14–5.8)
SODIUM SERPL-SCNC: 134 MMOL/L (ref 136–145)
WBC NRBC COR # BLD AUTO: 7.11 10*3/MM3 (ref 3.4–10.8)

## 2024-01-02 PROCEDURE — 82948 REAGENT STRIP/BLOOD GLUCOSE: CPT

## 2024-01-02 PROCEDURE — 63710000001 INSULIN LISPRO (HUMAN) PER 5 UNITS: Performed by: STUDENT IN AN ORGANIZED HEALTH CARE EDUCATION/TRAINING PROGRAM

## 2024-01-02 PROCEDURE — 97110 THERAPEUTIC EXERCISES: CPT

## 2024-01-02 PROCEDURE — 25010000002 ENOXAPARIN PER 10 MG: Performed by: ORTHOPAEDIC SURGERY

## 2024-01-02 PROCEDURE — 80048 BASIC METABOLIC PNL TOTAL CA: CPT | Performed by: ORTHOPAEDIC SURGERY

## 2024-01-02 PROCEDURE — 63710000001 INSULIN GLARGINE PER 5 UNITS: Performed by: ORTHOPAEDIC SURGERY

## 2024-01-02 PROCEDURE — 85027 COMPLETE CBC AUTOMATED: CPT | Performed by: ORTHOPAEDIC SURGERY

## 2024-01-02 PROCEDURE — 97530 THERAPEUTIC ACTIVITIES: CPT

## 2024-01-02 PROCEDURE — 85610 PROTHROMBIN TIME: CPT | Performed by: ORTHOPAEDIC SURGERY

## 2024-01-02 RX ORDER — ENOXAPARIN SODIUM 100 MG/ML
30 INJECTION SUBCUTANEOUS EVERY 24 HOURS
Status: DISCONTINUED | OUTPATIENT
Start: 2024-01-02 | End: 2024-01-03 | Stop reason: HOSPADM

## 2024-01-02 RX ADMIN — SENNOSIDES AND DOCUSATE SODIUM 2 TABLET: 50; 8.6 TABLET ORAL at 21:28

## 2024-01-02 RX ADMIN — INSULIN LISPRO 2 UNITS: 100 INJECTION, SOLUTION INTRAVENOUS; SUBCUTANEOUS at 08:37

## 2024-01-02 RX ADMIN — SENNOSIDES AND DOCUSATE SODIUM 2 TABLET: 50; 8.6 TABLET ORAL at 08:37

## 2024-01-02 RX ADMIN — GABAPENTIN 600 MG: 300 CAPSULE ORAL at 08:37

## 2024-01-02 RX ADMIN — PANTOPRAZOLE SODIUM 40 MG: 40 TABLET, DELAYED RELEASE ORAL at 08:37

## 2024-01-02 RX ADMIN — HYDROCODONE BITARTRATE AND ACETAMINOPHEN 1 TABLET: 5; 325 TABLET ORAL at 10:15

## 2024-01-02 RX ADMIN — LOSARTAN POTASSIUM 100 MG: 100 TABLET, FILM COATED ORAL at 08:37

## 2024-01-02 RX ADMIN — INSULIN LISPRO 2 UNITS: 100 INJECTION, SOLUTION INTRAVENOUS; SUBCUTANEOUS at 17:28

## 2024-01-02 RX ADMIN — ATENOLOL 100 MG: 50 TABLET ORAL at 08:37

## 2024-01-02 RX ADMIN — INSULIN LISPRO 3 UNITS: 100 INJECTION, SOLUTION INTRAVENOUS; SUBCUTANEOUS at 21:28

## 2024-01-02 RX ADMIN — INSULIN LISPRO 3 UNITS: 100 INJECTION, SOLUTION INTRAVENOUS; SUBCUTANEOUS at 12:52

## 2024-01-02 RX ADMIN — CLOPIDOGREL BISULFATE 75 MG: 75 TABLET, FILM COATED ORAL at 08:37

## 2024-01-02 RX ADMIN — INSULIN GLARGINE 10 UNITS: 100 INJECTION, SOLUTION SUBCUTANEOUS at 21:29

## 2024-01-02 RX ADMIN — Medication 1 TABLET: at 08:37

## 2024-01-02 RX ADMIN — ATORVASTATIN CALCIUM 80 MG: 80 TABLET, FILM COATED ORAL at 08:37

## 2024-01-02 RX ADMIN — GABAPENTIN 600 MG: 300 CAPSULE ORAL at 21:28

## 2024-01-02 RX ADMIN — WARFARIN 3 MG: 3 TABLET ORAL at 17:27

## 2024-01-02 RX ADMIN — AMLODIPINE BESYLATE 5 MG: 5 TABLET ORAL at 08:37

## 2024-01-02 RX ADMIN — TAMSULOSIN HYDROCHLORIDE 0.4 MG: 0.4 CAPSULE ORAL at 08:37

## 2024-01-02 RX ADMIN — ENOXAPARIN SODIUM 30 MG: 100 INJECTION SUBCUTANEOUS at 08:39

## 2024-01-02 RX ADMIN — HYDROCODONE BITARTRATE AND ACETAMINOPHEN 1 TABLET: 5; 325 TABLET ORAL at 23:12

## 2024-01-02 NOTE — PROGRESS NOTES
"    DAILY PROGRESS NOTE  [facility]    Patient Identification:  Name: Humble Rios  Age: 78 y.o.  Sex: male  :  1945  MRN: [id number]         Primary Care Physician: Dereck Miramontes MD    Subjective:  Interval History: Continues to clinically feel better.  Given recent PT, more receptive to the idea of SNF.  Family x 1 at bedside.  He is not complaining of anything new such as chest pain troubles breathing and denies any nausea or vomiting.    Objective:    Scheduled Meds:amLODIPine, 5 mg, Oral, Daily  atenolol, 100 mg, Oral, Daily  atorvastatin, 80 mg, Oral, Daily  clopidogrel, 75 mg, Oral, Daily  enoxaparin, 30 mg, Subcutaneous, Q24H  gabapentin, 600 mg, Oral, BID  insulin glargine, 10 Units, Subcutaneous, Nightly  insulin lispro, 2-7 Units, Subcutaneous, 4x Daily AC & at Bedtime  losartan, 100 mg, Oral, Daily  multivitamin with minerals, 1 tablet, Oral, Daily  pantoprazole, 40 mg, Oral, QAM AC  senna-docusate sodium, 2 tablet, Oral, BID  tamsulosin, 0.4 mg, Oral, BID  warfarin, 3 mg, Oral, Daily      Continuous Infusions:Pharmacy to dose warfarin,         Vital signs in last 24 hours:  Temp:  [98.1 °F (36.7 °C)-99 °F (37.2 °C)] 99 °F (37.2 °C)  Heart Rate:  [58-73] 73  Resp:  [18] 18  BP: (131-164)/(50-69) 164/69    Intake/Output:    Intake/Output Summary (Last 24 hours) at 2024 1233  Last data filed at 2024 1017  Gross per 24 hour   Intake 240 ml   Output 825 ml   Net -585 ml       Exam:  /69 (BP Location: Right arm, Patient Position: Lying)   Pulse 73   Temp 99 °F (37.2 °C) (Oral)   Resp 18   Ht 180.3 cm (71\")   Wt 78.9 kg (174 lb)   SpO2 94%   BMI 24.27 kg/m²     General Appearance:    Alert, cooperative, nontoxic, AAOx3                         Throat:   Oral mucosa pink and moist                           Neck:   No JVD                         Lungs:    Clear to auscultation bilaterally, respirations unlabored                          Heart:    Regular rate " and rhythm, S1 and S2 normal                  Abdomen:     Soft, nontender, bowel sounds active                  Extremities: Right CDI, no cyanosis or edema                         Data Review:  Labs in chart were reviewed.    Assessment:  Active Hospital Problems    Diagnosis  POA    **Closed displaced fracture of right femoral neck [S72.001A]  Yes    CKD (chronic kidney disease) [N18.9]  Unknown    Atrial fibrillation [I48.91]  Unknown    HTN (hypertension) [I10]  Unknown    CHF (congestive heart failure) [I50.9]  Unknown    Anemia [D64.9]  Unknown    Type 2 diabetes mellitus [E11.9]  Unknown    PVD (peripheral vascular disease) [I73.9]  Unknown      Resolved Hospital Problems   No resolved problems to display.       Plan:    Right femoral neck fracture status post bipolar Pantera plasty per  on 12/31/2023              -Coumadin resumed INR -1.06-1.14 with pharmacy dosing and also on Lovenox prophylactically until INR becomes therapeutic        DM2 with CKD 4              -A1c 8.7 -continue basal/bolus              -Mild bump in creatinine -will ask renal to evaluate for TIERNEY (Lasix remains off though patient is maintained on ARB)               -If needed consider resumption of glimepiride pending trends     Anemia of chronic disease compounded by acute blood loss anemia postoperative state              -Hgb 11.4 at admission.  Currently 11.1-10.1 and no need to further trend        PAF/HTN/diastolic CHF              -Can resume Lasix in a.m. if needed daily (home MAR states as needed)              -Cards [facility] on 12/30/2023 with no stents placed              -No postoperative hypotension noticed     PVD on Plavix     HLD on statin     GERD on PPI     BPH on Flomax        Disposition -PT endorsing SNF and CCP to coordinate    Thaddeus Freedman MD  1/2/2024  12:33 EST

## 2024-01-02 NOTE — CASE MANAGEMENT/SOCIAL WORK
Continued Stay Note  [facility]     Patient Name: Humble Rios  MRN: [id number]  Today's Date: 1/2/2024    Admit Date: 12/28/2023    Plan: [facility] pending bed availability   Discharge Plan       Row Name 01/02/24 1258       Plan    Plan [facility] pending bed availability    Patient/Family in Agreement with Plan yes    Plan Comments CCP spoke with patient and spouse at  bedside regarding updates on the dc plan. Patient and spouse are agreeable to SNF. Patient would like to go to [facility]. [facility] accepted. Patient will likely need wheelchair van vs stretcher van. CCP following for when patient is closer to dc to schedule transport. [employer]                   Discharge Codes    No documentation.                 Expected Discharge Date and Time       Expected Discharge Date Expected Discharge Time    Jan 1, 2024               MYRANDA Blanco

## 2024-01-02 NOTE — PLAN OF CARE
Goal Outcome Evaluation:  Plan of Care Reviewed With: patient, spouse        Progress: improving  Outcome Evaluation: Patient has been resting comfortably. Up in recliner for a couple of hours. Max assist x2 to and from. Worked with therapy. PRN pain med x1 with positive effects. Dressing intact to right hip. O2 able to be weaned during day while awake, but unable to remove while sleeping. Vital signs stable. Call light in reach. Safety maintained.

## 2024-01-02 NOTE — PROGRESS NOTES
Humble Rios   78 y.o.  male    LOS: 5 days   Patient Care Team:  Dereck Miramontes MD as PCP - General  Dereck Miramontes MD as PCP - Family Medicine      Subjective   Resting   Review of Systems:   Lungs: No cough, no SOA  CV: No CP, no palpitations  GI: No nausea, vomiting, or diarrhea.     Medication Review:   Current Facility-Administered Medications:     acetaminophen (TYLENOL) tablet 650 mg, 650 mg, Oral, Q4H PRN, Francisco Pederson MD    albuterol (PROVENTIL) nebulizer solution 0.083% 2.5 mg/3mL, 2.5 mg, Nebulization, Q6H PRN, Francisco Pederson MD    amLODIPine (NORVASC) tablet 5 mg, 5 mg, Oral, Daily, Francisco Pederson MD, 5 mg at 01/02/24 0837    atenolol (TENORMIN) tablet 100 mg, 100 mg, Oral, Daily, Francisco Pederson MD, 100 mg at 01/02/24 0837    atorvastatin (LIPITOR) tablet 80 mg, 80 mg, Oral, Daily, Francisco Pederson MD, 80 mg at 01/02/24 0837    sennosides-docusate (PERICOLACE) 8.6-50 MG per tablet 2 tablet, 2 tablet, Oral, BID, 2 tablet at 01/02/24 0837 **AND** polyethylene glycol (MIRALAX) packet 17 g, 17 g, Oral, Daily PRN **AND** bisacodyl (DULCOLAX) EC tablet 5 mg, 5 mg, Oral, Daily PRN **AND** bisacodyl (DULCOLAX) suppository 10 mg, 10 mg, Rectal, Daily PRN, Francisco Pederson MD    clopidogrel (PLAVIX) tablet 75 mg, 75 mg, Oral, Daily, Francisco Pederson MD, 75 mg at 01/02/24 0837    cyclobenzaprine (FLEXERIL) tablet 10 mg, 10 mg, Oral, TID PRN, Francisco Pederson MD, 10 mg at 12/28/23 2113    diphenhydrAMINE (BENADRYL) capsule 25 mg, 25 mg, Oral, Q6H PRN, Francisco Pederson MD    Enoxaparin Sodium (LOVENOX) syringe 30 mg, 30 mg, Subcutaneous, Q24H, Francisco Pederson MD, 30 mg at 01/02/24 0839    furosemide (LASIX) tablet 40 mg, 40 mg, Oral, Daily PRN, Francisco Pederson MD    gabapentin (NEURONTIN) capsule 600 mg, 600 mg, Oral, BID, Francisco Pederson MD, 600 mg at 01/02/24 0837    HYDROcodone-acetaminophen (NORCO) 5-325 MG per tablet 1 tablet, 1 tablet, Oral, Q4H PRN, Francisco Pederson MD, 1 tablet at  01/01/24 2356    HYDROmorphone (DILAUDID) injection 1 mg, 1 mg, Intravenous, Q2H PRN, 1 mg at 12/31/23 0204 **AND** naloxone (NARCAN) injection 0.4 mg, 0.4 mg, Intravenous, Q5 Min PRN, Francisco Pederson MD    insulin glargine (LANTUS, SEMGLEE) injection 10 Units, 10 Units, Subcutaneous, Nightly, Francisco Pederson MD, 10 Units at 01/01/24 2207    insulin lispro (HUMALOG/ADMELOG) injection 2-7 Units, 2-7 Units, Subcutaneous, 4x Daily AC & at Bedtime, [place] MD, 2 Units at 01/02/24 0837    losartan (COZAAR) tablet 100 mg, 100 mg, Oral, Daily, Francisco Pederson MD, 100 mg at 01/02/24 0837    melatonin tablet 3 mg, 3 mg, Oral, Nightly PRN, Francisco Pederson MD    multivitamin with minerals 1 tablet, 1 tablet, Oral, Daily, Francisco Pederson MD, 1 tablet at 01/02/24 0837    nitroglycerin (NITROSTAT) SL tablet 0.4 mg, 0.4 mg, Sublingual, Q5 Min PRN, Francisco Pederson MD    ondansetron ODT (ZOFRAN-ODT) disintegrating tablet 4 mg, 4 mg, Oral, Q6H PRN, 4 mg at 12/28/23 2145 **OR** ondansetron (ZOFRAN) injection 4 mg, 4 mg, Intravenous, Q6H PRN, Francisco Pederson MD    pantoprazole (PROTONIX) EC tablet 40 mg, 40 mg, Oral, QAM AC, Francisco Pederson MD, 40 mg at 01/02/24 0837    Pharmacy to dose warfarin, , Does not apply, Continuous PRN, Francisco Pederson MD    Insert Peripheral IV, , , Once **AND** sodium chloride 0.9 % flush 10 mL, 10 mL, Intravenous, PRN, Francisco Pederson MD    tamsulosin (FLOMAX) 24 hr capsule 0.4 mg, 0.4 mg, Oral, BID, Francisco Pederson MD, 0.4 mg at 01/02/24 0837    warfarin (COUMADIN) tablet 3 mg, 3 mg, Oral, Daily, Francisco Pederson MD, 3 mg at 01/01/24 1704      Objective     Vital Sign Min/Max for last 24 hours  Temp  Min: 98.1 °F (36.7 °C)  Max: 99 °F (37.2 °C)   BP  Min: 131/50  Max: 164/69    Pulse  Min: 58  Max: 73         12/29/23  0500 12/29/23  1422   Weight: 78.9 kg (174 lb) 78.9 kg (174 lb)        Intake/Output Summary (Last 24 hours) at 1/2/2024 0911  Last data filed at 1/1/2024 1800  Gross per  "24 hour   Intake --   Output 275 ml   Net -275 ml       Physical Exam:    General Appearance:    No acute distress   Lungs:     Clear to auscultation bilaterally. No wheezes, rhonchi or rales.     Heart:    Regular rate and rhythm.  Normal S1 and S2. No murmur, no gallop, rub or lift. , No JVD.   Abdomen:     Normal bowel sounds, soft, nontender, nondistended.   Extremities:   No clubbing, cyanosis or edema.     Skin:   Warm, dry, hip dressing c/d/i   Neurologic:   Awake alert and oriented x3.      Monitor:sr  Results Review:     I reviewed the patient's new clinical results.    Sodium   Date Value Ref Range Status   01/02/2024 134 (L) 136 - 145 mmol/L Final   01/01/2024 135 (L) 136 - 145 mmol/L Final   12/31/2023 136 136 - 145 mmol/L Final     Potassium   Date Value Ref Range Status   01/02/2024 4.4 3.5 - 5.2 mmol/L Final   01/01/2024 4.8 3.5 - 5.2 mmol/L Final     Comment:     Slight hemolysis detected by analyzer. Result may be falsely elevated.   12/31/2023 4.8 3.5 - 5.2 mmol/L Final     Chloride   Date Value Ref Range Status   01/02/2024 99 98 - 107 mmol/L Final   01/01/2024 100 98 - 107 mmol/L Final   12/31/2023 101 98 - 107 mmol/L Final     No results found for: \"PLASMABICARB\"  BUN   Date Value Ref Range Status   01/02/2024 42 (H) 8 - 23 mg/dL Final   01/01/2024 41 (H) 8 - 23 mg/dL Final   12/31/2023 29 (H) 8 - 23 mg/dL Final     Creatinine   Date Value Ref Range Status   01/02/2024 3.05 (H) 0.76 - 1.27 mg/dL Final   01/01/2024 2.83 (H) 0.76 - 1.27 mg/dL Final   12/31/2023 2.39 (H) 0.76 - 1.27 mg/dL Final     Calcium   Date Value Ref Range Status   01/02/2024 8.7 8.6 - 10.5 mg/dL Final   01/01/2024 8.9 8.6 - 10.5 mg/dL Final   12/31/2023 8.9 8.6 - 10.5 mg/dL Final     No results found for: \"MG\"    Results from last 7 days   Lab Units 01/02/24  0341   WBC 10*3/mm3 7.11   HEMOGLOBIN g/dL 10.1*   HEMATOCRIT % 31.2*   PLATELETS 10*3/mm3 228     Lab Results   Component Value Date    CHOL 132 12/31/2023     Lab " Results   Component Value Date    HDL 44 12/31/2023     Lab Results   Component Value Date    LDL 69 12/31/2023     Lab Results   Component Value Date    TRIG 101 12/31/2023     Results from last 7 days   Lab Units 01/02/24  0341 01/01/24  0435 12/31/23  1137   INR  1.14* 1.06 1.08                 Results from last 7 days   Lab Units 12/30/23  0421   HEMOGLOBIN A1C % 8.70*       Assessment/ Plan  Closed displaced fracture of right femoral neck status post bipolar Pantera plasty per  on 12/31/2023   HTN, controlled with medications  HLD, on statins  DM  PVD  Paroxysmal atrial fibrillation : LVEF normal    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   Anemia     Patient underwent cardiac cath for unstable angina symptoms: LHC with mild nonobstructive CAD. LVEF and LVEDP normal   Continue Beta blocker, statin and anti HTN medications / amlodipine.   Stable post op.   Will sign off.                      Time:  15 min

## 2024-01-02 NOTE — PLAN OF CARE
Goal Outcome Evaluation:              Outcome Evaluation: Patient was upto the bsc a couple of times during shift. Patient spouse at bedside and is very helpful with him. Norco 5 mg given for pain. Maintain safety and continue to monitor.

## 2024-01-02 NOTE — PROGRESS NOTES
"Patient Care Team:  Dereck Miramontes MD as PCP - General  Dereck Miramontes MD as PCP - Family Medicine    Sub: No chest pain today     Objective   Vital Signs  Temp:  [97.3 °F (36.3 °C)-98.1 °F (36.7 °C)] 97.9 °F (36.6 °C)  Heart Rate:  [58-71] 58  Resp:  [16] 16  BP: (131-171)/(50-69) 131/50    Intake/Output Summary (Last 24 hours) at 1/1/2024 1906  Last data filed at 1/1/2024 1800  Gross per 24 hour   Intake 240 ml   Output 1425 ml   Net -1185 ml     Flowsheet Rows      Flowsheet Row First Filed Value   Admission Height 181.6 cm (71.5\") Documented at 12/29/2023 0500   Admission Weight 78.9 kg (174 lb) Documented at 12/29/2023 0500            Physical Exam:   General Appearance:    Alert, cooperative, in no acute distress   Lungs:     Clear to auscultation,respirations regular, even and    unlabored    Heart:    Regular rhythm and normal rate, normal S1 and S2, no  murmur, no gallop, no rub, no click   Chest Wall:    No abnormalities observed   Abdomen:     Normal bowel sounds, no masses, no organomegaly, soft    non-tender, non-distended, no guarding, no rebound                tenderness   Extremities:   no edema, no cyanosis, no  redness     Results Review:    ECG 12 Lead Pre-Op / Pre-Procedure   Final Result   HEART RATE= 59  bpm   RR Interval= 1017  ms   AK Interval= 194  ms   P Horizontal Axis= -13  deg   P Front Axis= 45  deg   QRSD Interval= 104  ms   QT Interval= 394  ms   QTcB= 391  ms   QRS Axis= -59  deg   T Wave Axis= 30  deg   - ABNORMAL ECG -   Sinus rhythm   RSR' in V1 or V2, probably normal variant   Inferior infarct, old   Borderline ST elevation, anterolateral leads   No Prior Tracing for Comparison   Electronically Signed By: Desi Trevino ([facility]) 29-Dec-2023 12:36:05   Date and Time of Study: 2023-12-29 10:50:42      SCANNED - TELEMETRY     Final Result      SCANNED - TELEMETRY     Final Result      SCANNED - TELEMETRY     Final Result      SCANNED - TELEMETRY     Final Result      SCANNED - " TELEMETRY     Final Result      SCANNED - TELEMETRY     Final Result      SCANNED - TELEMETRY     Final Result      SCANNED - TELEMETRY     Final Result      SCANNED - TELEMETRY     Final Result      SCANNED - TELEMETRY     Final Result      SCANNED - TELEMETRY     Final Result      SCANNED - TELEMETRY     Final Result      SCANNED - TELEMETRY     Final Result      SCANNED - TELEMETRY     Final Result      SCANNED - TELEMETRY     Final Result      SCANNED - TELEMETRY     Final Result        Results for orders placed during the hospital encounter of 12/28/23    Adult Transthoracic Echo Complete w/ Color, Spectral and Contrast if Necessary Per Protocol    Interpretation Summary    Left ventricular systolic function is normal. Calculated left ventricular EF = 66.2%    Left ventricular wall thickness is consistent with mild concentric hypertrophy.    Left ventricular diastolic function is consistent with (grade Ia w/high LAP) impaired relaxation.    Estimated right ventricular systolic pressure from tricuspid regurgitation is mildly elevated (35-45 mmHg). Calculated right ventricular systolic pressure from tricuspid regurgitation is 38 mmHg.    Results for orders placed during the hospital encounter of 12/28/23    Cardiac Catheterization/Vascular Study (Needs Review)  This result has not been signed. Information might be incomplete.    Narrative  :  Zofia Phillips MD    Procedures performed:  1.  Left heart catheterization.  2.  Left ventriculography  3.  Selective native coronary angiography    Indications:  Unstable angina      Description of the procedure:  Patient was brought to the cardiac catheter was explained the risk and benefit and alternatives of the procedure. Patient signed informed consent, was prepped and draped in sterile fashion for right radial artery access.  Using micropuncture needle and modified Seldinger technique a 5/6 Portuguese sheath was advanced into the right radial artery.  JL3.5 and  JR4 catheter was used to engage the left and right coronary artery respectively.  A JR 4 catheter was used to cross the aortic valve and perform a left heart catheterization and LV gram.    All exchanges were done over the wire.  Hemostasis was achieved by manual compression / TR band.  Moderate sedation 15 minutes.    No complications noted.    Findings:  1.  Left heart catheterization: LVEDP 7 mmHg. No gradient across the AV valve.    2.  Left ventriculography:  LV function normal.  LVEF 60-65%.    3.  Selective native coronary angiography:  A. Left main: short vessel and quickly bifurcates into LAD and left circumflex. No angiographic disease noted in left main.  B. LAD: Large caliber vessel gives rise to 1 prominent diagonal branch.  Mid LAD 20-30% stenosis.  Distal LAD 20% stenosis.  C. Left circumflex: Large-caliber vessel gives rise to 3 obtuse marginal branches.  Mid circumflex has 30% stenosis.  Obtuse marginal branches with luminal irregularities not more than 10% stenosis.  D. Right coronary artery: Large-caliber dominant vessel distally divides into RPDA and RPLV.  Right coronary artery with diffuse 20% stenosis.      Conclusion:  1.  Preserved LVEF and normal LVEDP.  2.  Mild nonobstructive coronary artery disease.      Recommendations:  Aggressive risk factor modification.  Medical management.      Zofia Phillips M.D    Results from last 7 days   Lab Units 01/01/24 0435   SODIUM mmol/L 135*   POTASSIUM mmol/L 4.8   CHLORIDE mmol/L 100   CO2 mmol/L 22.5   BUN mg/dL 41*   CREATININE mg/dL 2.83*   GLUCOSE mg/dL 205*   CALCIUM mg/dL 8.9         Results from last 7 days   Lab Units 01/01/24 0435 12/31/23  1137 12/30/23  0421   WBC 10*3/mm3 10.22 10.55 8.59   HEMOGLOBIN g/dL 11.1* 12.0* 11.3*   HEMATOCRIT % 33.8* 37.6 34.9*   PLATELETS 10*3/mm3 239 237 221     Results from last 7 days   Lab Units 01/01/24 0435 12/31/23  1137 12/30/23  0421   INR  1.06 1.08 1.40*         Results from last 7 days   Lab  Units 12/31/23  1137   CHOLESTEROL mg/dL 132   TRIGLYCERIDES mg/dL 101   HDL CHOL mg/dL 44   LDL CHOL mg/dL 69       amLODIPine, 5 mg, Oral, Daily  atenolol, 100 mg, Oral, Daily  atorvastatin, 80 mg, Oral, Daily  clopidogrel, 75 mg, Oral, Daily  enoxaparin, 40 mg, Subcutaneous, Q24H  gabapentin, 600 mg, Oral, BID  insulin glargine, 10 Units, Subcutaneous, Nightly  insulin lispro, 2-7 Units, Subcutaneous, 4x Daily AC & at Bedtime  losartan, 100 mg, Oral, Daily  multivitamin with minerals, 1 tablet, Oral, Daily  pantoprazole, 40 mg, Oral, QAM AC  senna-docusate sodium, 2 tablet, Oral, BID  tamsulosin, 0.4 mg, Oral, BID  warfarin, 3 mg, Oral, Daily      Pharmacy to dose warfarin,           I reviewed the patient's new clinical results.  I personally viewed and interpreted the patient's EKG/Telemetry data    Assessment/Plan:  Closed displaced fracture of right femoral neck  HTN  HLD  DM  PVD  Paroxysmal atrial fibrillation : LVEF normal    Mild nonobstructive CAD     Patient underwent cardiac cath for unstable angina symptoms: [facility] with mild nonobstructive CAD. LVEF and LVEDP normal   Continue Beta blocker and anti HTN medications / amlodipine     He remains at low risk for perioperative cardiac event during orthopaedic surgery. Currently well optimized from volume status. Tele stable. No sings of overt ischemia or heart failure.   Continue current medications.   Resume anticoagulation after surgery as per orthopaedic surgery.     Today he remains stable and will continue with medical management of his mild nonobstructive CAD.    Madhu Jensen MD  01/01/24  19:06 EST

## 2024-01-02 NOTE — PLAN OF CARE
"Goal Outcome Evaluation:  Plan of Care Reviewed With: patient, spouse        Progress: no change  Outcome Evaluation: Pt seen by PT for tx. pt was lethargic throughout session and req redirect to task.  Pt was UIC and stood 3x req mod/max A x 2 and use of fww. Pt was unable to take steps when directed and stated, \"I can't\". Pt performed ther ex for strengthening w/ max cues to remain on task. PT will prog as pt hemanth.      Anticipated Discharge Disposition (PT): inpatient rehabilitation facility, skilled nursing facility  "

## 2024-01-02 NOTE — THERAPY TREATMENT NOTE
Patient Name: Humble Rios  : 1945    MRN: [id number]                              Today's Date: 2024       Admit Date: 2023    Visit Dx:     ICD-10-CM ICD-9-CM   1. Closed displaced fracture of right femoral neck  S72.001A 820.8   2. Chronic kidney disease, unspecified CKD stage  N18.9 585.9   3. Chronic anticoagulation  Z79.01 V58.61     Patient Active Problem List   Diagnosis    Closed displaced fracture of right femoral neck    CKD (chronic kidney disease)    Atrial fibrillation    HTN (hypertension)    CHF (congestive heart failure)    Anemia    Type 2 diabetes mellitus    PVD (peripheral vascular disease)     Past Medical History:   Diagnosis Date    Abrasion     BACK OF LEFT LEG, SCABBED OVER, HEALING    Anesthesia complication     WIFE STATES HE NEEDS CHILD ET TUBE DUE TO A CURVATURE OF HIS TRACHEA    Arthritis     Asthma     Atrial fibrillation     Bladder cancer     COPD (chronic obstructive pulmonary disease)     Diabetes mellitus     Diverticulitis     Enlarged prostate     Fatty liver     GERD (gastroesophageal reflux disease)     Hard to intubate     Hyperlipidemia     Hypertension     Pneumonia     Seasonal allergies     Skin cancer     Sleep apnea     NO CPAP MACHINE    Stage 4 chronic kidney disease     TIA (transient ischemic attack)      Past Surgical History:   Procedure Laterality Date    CARDIAC CATHETERIZATION      CARDIAC CATHETERIZATION N/A 2023    Procedure: Left Heart Cath;  Surgeon: Zofia Phillips MD;  Location: [facility] CATH INVASIVE LOCATION;  Service: Cardiovascular;  Laterality: N/A;    CARDIAC CATHETERIZATION N/A 2023    Procedure: Coronary angiography;  Surgeon: Zofia Phillips MD;  [facility] C[facility]AS[facility];  Service: Cardiovascular;  Laterality: N/A;    CARDIAC CATHETERIZATION N/A 2023    Procedure: Left ventriculography;  Surgeon: Zofia Phillips MD;  [facility] C[facility];  Service: Cardiovascular;   Laterality: N/A;    CERVICAL FUSION ANTERIOR WITH ARTIFICIAL DISCECTOMY IMPLANTATION      CHOLECYSTECTOMY      COLONOSCOPY      CYSTOSCOPY BLADDER BIOPSY      CYSTOSCOPY BLADDER BIOPSY Bilateral 1/6/2023    Procedure: CYSTOSCOPY BILATERAL RETROGRADE PYELOGRAM AND BLADDER BIOPSY;  Surgeon: Kong Maurer MD;  [facility];  Service: Urology;  Laterality: Bilateral;    HEMORRHOIDECTOMY      X 2    HIP ENDOPROSTHESIS Right 12/31/2023    Procedure: HIP ENDOPROSTHESIS;  Surgeon: Francisco Pederson MD;  Location: [facility] OR;  Service: Orthopedics;  Laterality: Right;    HIP SURGERY Right     X 2 FOR BURSA REMOVAL    INGUINAL HERNIA REPAIR Right     LUMBAR DECOMPRESSION      LUMBAR LAMINECTOMY      TOE AMPUTATION Left     GREAT TOE      General Information       Row Name 01/02/24 1134          Physical Therapy Time and Intention    Document Type therapy note (daily note)  -     Mode of Treatment physical therapy  -       Row Name 01/02/24 1134          General Information    Existing Precautions/Restrictions hip;hip, posterior  -PH       Row Name 01/02/24 1134          Cognition    Orientation Status (Cognition) oriented x 3  -       Row Name 01/02/24 1134          Safety Issues, Functional Mobility    Safety Issues Affecting Function (Mobility) insight into deficits/self-awareness;problem-solving  -     Impairments Affecting Function (Mobility) endurance/activity tolerance;strength;balance;range of motion (ROM)  -     Comment, Safety Issues/Impairments (Mobility) gt belt and non skid socks donned  -               User Key  (r) = Recorded By, (t) = Taken By, (c) = Cosigned By      Initials Name Provider Type    PH Mary Andres PTA Physical Therapist Assistant                   Mobility       Row Name 01/02/24 1134          Bed Mobility    Scooting/Bridging [place] (Bed Mobility) not tested  -PH     Sit-Supine [place] (Bed Mobility) not tested  -PH     Comment, (Bed  "Mobility) UIC and returned to chair  -PH       Row Name 01/02/24 1134          Sit-Stand Transfer    Sit-Stand [place] (Transfers) moderate assist (50% patient effort);2 person assist;verbal cues;nonverbal cues (demo/gesture);maximum assist (25% patient effort)  -PH     Comment, (Sit-Stand Transfer) 3x from chair; cues for B hand placement; unsteady w/ assist to place B hands on fww   -PH       Row Name 01/02/24 1134          Gait/Stairs (Locomotion)    [place] Level (Gait) unable to assess  -PH     [place] Level (Stairs) unable to assess  -PH     Comment, (Gait/Stairs) pt unable to take steps when directed; lethargic and not attempting, stated \"I can't\"  -PH       Row Name 01/02/24 1134          Mobility    Extremity Weight-bearing Status right lower extremity  -PH     Right Lower Extremity (Weight-bearing Status) weight-bearing as tolerated (WBAT)  -PH               User Key  (r) = Recorded By, (t) = Taken By, (c) = Cosigned By      Initials Name Provider Type    Mary Nava PTA Physical Therapist Assistant                   Obj/Interventions       Row Name 01/02/24 1137          Motor Skills    Therapeutic Exercise other (see comments)  R JULIANE program x 10 reps for B LE; freq redirect w/ pt lethargic  -PH       Row Name 01/02/24 1137          Balance    Balance Assessment sitting static balance;standing static balance  -PH     Static Sitting Balance standby assist  -PH     Static Standing Balance minimal assist;moderate assist;verbal cues;non-verbal cues (demo/gesture);2-person assist  -PH     Comment, Balance pt's longest stand was min a x 2 w/ use of fww for 20 sec  -PH               User Key  (r) = Recorded By, (t) = Taken By, (c) = Cosigned By      Initials Name Provider Type    Mary Nava PTA Physical Therapist Assistant                   Goals/Plan    No documentation.                  Clinical Impression       Row Name 01/02/24 1138          Pain    " "Posttreatment Pain Rating 7/10  -PH     Pain Location - Side/Orientation Right  -PH     Pain Location lower  -PH     Pain Location - extremity  -PH     Pain Intervention(s) Ambulation/increased activity;Repositioned  -PH       Row Name 01/02/24 1138          Plan of Care Review    Plan of Care Reviewed With patient;spouse  -PH     Progress no change  -PH     Outcome Evaluation Pt seen by PT for tx. pt was lethargic throughout session and req redirect to task.  Pt was UIC and stood 3x req mod/max A x 2 and use of fww. Pt was unable to take steps when directed and stated, \"I can't\". Pt performed ther ex for strengthening w/ max cues to remain on task. PT will prog as pt hemanth.  -PH       Row Name 01/02/24 1138          Vital Signs    O2 Delivery Pre Treatment room air  -PH     O2 Delivery Intra Treatment room air  -PH     O2 Delivery Post Treatment room air  -PH       Row Name 01/02/24 1138          Positioning and Restraints    Pre-Treatment Position sitting in chair/recliner  -PH     Post Treatment Position chair  -PH     In Chair reclined;call light within reach;encouraged to call for assist;exit alarm on;with family/caregiver;notified ns  -PH               User Key  (r) = Recorded By, (t) = Taken By, (c) = Cosigned By      Initials Name Provider Type    PH Mary Andres PTA Physical Therapist Assistant                   Outcome Measures       Row Name 01/02/24 1140 01/02/24 0835       How much help from another person do you currently need...    Turning from your back to your side while in flat bed without using bedrails? 2  -PH 2  -HD    Moving from lying on back to sitting on the side of a flat bed without bedrails? 2  -PH 2  -HD    Moving to and from a bed to a chair (including a wheelchair)? 1  -PH 2  -HD    Standing up from a chair using your arms (e.g., wheelchair, bedside chair)? 2  -PH 2  -HD    Climbing 3-5 steps with a railing? 1  -PH 1  -HD    To walk in hospital room? 1  -PH 2  -HD    AM-PAC 6 " "Clicks Score (PT) 9  -PH 11  -HD    Highest Level of Mobility Goal 3 --> Sit at edge of bed  -PH 4 --> Transfer to chair/commode  -HD      Row Name 01/02/24 1140          Functional Assessment    Outcome Measure Options AM-PAC 6 Clicks Basic Mobility (PT)  -PH               User Key  (r) = Recorded By, (t) = Taken By, (c) = Cosigned By      Initials Name Provider Type    PH Mary Andres PTA Physical Therapist Assistant    Vanesa Ken, RN Registered Nurse                                 Physical Therapy Education       Title: PT OT SLP Therapies (Done)       Topic: Physical Therapy (Done)       Point: Mobility training (Done)       Learning Progress Summary             Patient Acceptance, E,TB,D, VU,NR by  at 1/2/2024 1141    Acceptance, E, VU by  at 1/1/2024 1326                                         User Key       Initials Effective Dates Name Provider Type Discipline     06/16/21 -  Mary Andres PTA Physical Therapist Assistant PT    TOYA 08/24/21 -  Brenda Madera PT Physical Therapist PT                  PT Recommendation and Plan     Plan of Care Reviewed With: patient, spouse  Progress: no change  Outcome Evaluation: Pt seen by PT for tx. pt was lethargic throughout session and req redirect to task.  Pt was UIC and stood 3x req mod/max A x 2 and use of fww. Pt was unable to take steps when directed and stated, \"I can't\". Pt performed ther ex for strengthening w/ max cues to remain on task. PT will prog as pt hemanth.     Time Calculation:         PT Charges       Row Name 01/02/24 1141             Time Calculation    Start Time 1120  -PH      Stop Time 1144  -PH      Time Calculation (min) 24 min  -PH      PT Received On 01/02/24  -PH      PT - Next Appointment 01/03/24  -PH         Timed Charges    61963 - PT Therapeutic Exercise Minutes 9  -PH      [id number] - PT Therapeutic Activity Minutes 15  -PH         Total Minutes    Timed Charges Total Minutes 24  -PH       Total " Minutes 24  -PH                User Key  (r) = Recorded By, (t) = Taken By, (c) = Cosigned By      Initials Name Provider Type    PH Mary Andres PTA Physical Therapist Assistant                  Therapy Charges for Today       Code Description Service Date Service Provider Modifiers Qty    60970800710  PT THER PROC EA 15 MIN 1/2/2024 Mary Andres PTA GP 1    32625059643 HC PT THERAPEUTIC ACT EA 15 MIN 1/2/2024 Mary Andres PTA GP 1    80239787135 HC PT THER SUPP EA 15 MIN 1/2/2024 Mary Andres PTA GP 2            PT G-Codes  Outcome Measure Options: AM-PAC 6 Clicks Basic Mobility (PT)  AM-PAC 6 Clicks Score (PT): 9  PT Discharge Summary  Anticipated Discharge Disposition (PT): inpatient rehabilitation facility, skilled nursing facility    Mary Andres PTA  1/2/2024

## 2024-01-03 VITALS
WEIGHT: 174 LBS | RESPIRATION RATE: 20 BRPM | DIASTOLIC BLOOD PRESSURE: 65 MMHG | HEIGHT: 71 IN | HEART RATE: 59 BPM | TEMPERATURE: 97.5 F | OXYGEN SATURATION: 97 % | SYSTOLIC BLOOD PRESSURE: 169 MMHG | BODY MASS INDEX: 24.36 KG/M2

## 2024-01-03 LAB
ANION GAP SERPL CALCULATED.3IONS-SCNC: 9.5 MMOL/L (ref 5–15)
BUN SERPL-MCNC: 49 MG/DL (ref 8–23)
BUN/CREAT SERPL: 18.1 (ref 7–25)
CALCIUM SPEC-SCNC: 8.9 MG/DL (ref 8.6–10.5)
CHLORIDE SERPL-SCNC: 97 MMOL/L (ref 98–107)
CO2 SERPL-SCNC: 24.5 MMOL/L (ref 22–29)
CREAT SERPL-MCNC: 2.71 MG/DL (ref 0.76–1.27)
DEPRECATED RDW RBC AUTO: 49.8 FL (ref 37–54)
EGFRCR SERPLBLD CKD-EPI 2021: 23.3 ML/MIN/1.73
ERYTHROCYTE [DISTWIDTH] IN BLOOD BY AUTOMATED COUNT: 14.9 % (ref 12.3–15.4)
GLUCOSE BLDC GLUCOMTR-MCNC: 181 MG/DL (ref 70–130)
GLUCOSE SERPL-MCNC: 140 MG/DL (ref 65–99)
HCT VFR BLD AUTO: 31.5 % (ref 37.5–51)
HGB BLD-MCNC: 10.5 G/DL (ref 13–17.7)
INR PPP: 1.1 (ref 0.9–1.1)
MCH RBC QN AUTO: 30.3 PG (ref 26.6–33)
MCHC RBC AUTO-ENTMCNC: 33.3 G/DL (ref 31.5–35.7)
MCV RBC AUTO: 90.8 FL (ref 79–97)
PLATELET # BLD AUTO: 208 10*3/MM3 (ref 140–450)
PMV BLD AUTO: 10.4 FL (ref 6–12)
POTASSIUM SERPL-SCNC: 4.6 MMOL/L (ref 3.5–5.2)
PROTHROMBIN TIME: 14.3 SECONDS (ref 11.7–14.2)
RBC # BLD AUTO: 3.47 10*6/MM3 (ref 4.14–5.8)
SODIUM SERPL-SCNC: 131 MMOL/L (ref 136–145)
WBC NRBC COR # BLD AUTO: 6.2 10*3/MM3 (ref 3.4–10.8)

## 2024-01-03 PROCEDURE — 97530 THERAPEUTIC ACTIVITIES: CPT

## 2024-01-03 PROCEDURE — 97110 THERAPEUTIC EXERCISES: CPT

## 2024-01-03 PROCEDURE — 85027 COMPLETE CBC AUTOMATED: CPT | Performed by: ORTHOPAEDIC SURGERY

## 2024-01-03 PROCEDURE — 82948 REAGENT STRIP/BLOOD GLUCOSE: CPT

## 2024-01-03 PROCEDURE — 85610 PROTHROMBIN TIME: CPT | Performed by: ORTHOPAEDIC SURGERY

## 2024-01-03 PROCEDURE — 80048 BASIC METABOLIC PNL TOTAL CA: CPT | Performed by: ORTHOPAEDIC SURGERY

## 2024-01-03 PROCEDURE — 63710000001 INSULIN LISPRO (HUMAN) PER 5 UNITS: Performed by: STUDENT IN AN ORGANIZED HEALTH CARE EDUCATION/TRAINING PROGRAM

## 2024-01-03 PROCEDURE — 25010000002 ENOXAPARIN PER 10 MG: Performed by: ORTHOPAEDIC SURGERY

## 2024-01-03 RX ORDER — WARFARIN SODIUM 5 MG/1
5 TABLET ORAL
Status: DISCONTINUED | OUTPATIENT
Start: 2024-01-03 | End: 2024-01-03 | Stop reason: HOSPADM

## 2024-01-03 RX ORDER — GABAPENTIN 300 MG/1
600 CAPSULE ORAL 2 TIMES DAILY
Qty: 6 CAPSULE | Refills: 0 | Status: SHIPPED | OUTPATIENT
Start: 2024-01-03

## 2024-01-03 RX ORDER — HYDROCODONE BITARTRATE AND ACETAMINOPHEN 5; 325 MG/1; MG/1
1 TABLET ORAL EVERY 4 HOURS PRN
Qty: 10 TABLET | Refills: 0 | Status: SHIPPED | OUTPATIENT
Start: 2024-01-03 | End: 2024-01-07

## 2024-01-03 RX ORDER — ENOXAPARIN SODIUM 100 MG/ML
30 INJECTION SUBCUTANEOUS EVERY 24 HOURS
Start: 2024-01-04 | End: 2024-01-11

## 2024-01-03 RX ADMIN — AMLODIPINE BESYLATE 5 MG: 5 TABLET ORAL at 08:52

## 2024-01-03 RX ADMIN — TAMSULOSIN HYDROCHLORIDE 0.4 MG: 0.4 CAPSULE ORAL at 08:52

## 2024-01-03 RX ADMIN — SENNOSIDES AND DOCUSATE SODIUM 2 TABLET: 50; 8.6 TABLET ORAL at 08:51

## 2024-01-03 RX ADMIN — Medication 1 TABLET: at 08:52

## 2024-01-03 RX ADMIN — ATENOLOL 100 MG: 50 TABLET ORAL at 08:52

## 2024-01-03 RX ADMIN — CLOPIDOGREL BISULFATE 75 MG: 75 TABLET, FILM COATED ORAL at 08:52

## 2024-01-03 RX ADMIN — PANTOPRAZOLE SODIUM 40 MG: 40 TABLET, DELAYED RELEASE ORAL at 08:52

## 2024-01-03 RX ADMIN — ATORVASTATIN CALCIUM 80 MG: 80 TABLET, FILM COATED ORAL at 08:52

## 2024-01-03 RX ADMIN — INSULIN LISPRO 2 UNITS: 100 INJECTION, SOLUTION INTRAVENOUS; SUBCUTANEOUS at 08:51

## 2024-01-03 RX ADMIN — ENOXAPARIN SODIUM 30 MG: 100 INJECTION SUBCUTANEOUS at 08:51

## 2024-01-03 RX ADMIN — LOSARTAN POTASSIUM 100 MG: 100 TABLET, FILM COATED ORAL at 08:52

## 2024-01-03 RX ADMIN — GABAPENTIN 600 MG: 300 CAPSULE ORAL at 08:52

## 2024-01-03 RX ADMIN — HYDROCODONE BITARTRATE AND ACETAMINOPHEN 1 TABLET: 5; 325 TABLET ORAL at 11:52

## 2024-01-03 NOTE — THERAPY TREATMENT NOTE
Patient Name: Humble Rios  : 1945    MRN: [id number]                              Today's Date: 1/3/2024       Admit Date: 2023    Visit Dx:     ICD-10-CM ICD-9-CM   1. Closed displaced fracture of right femoral neck  S72.001A 820.8   2. Chronic kidney disease, unspecified CKD stage  N18.9 585.9   3. Chronic anticoagulation  Z79.01 V58.61     Patient Active Problem List   Diagnosis    Closed displaced fracture of right femoral neck    CKD (chronic kidney disease)    Atrial fibrillation    HTN (hypertension)    CHF (congestive heart failure)    Anemia    Type 2 diabetes mellitus    PVD (peripheral vascular disease)     Past Medical History:   Diagnosis Date    Abrasion     BACK OF LEFT LEG, SCABBED OVER, HEALING    Anesthesia complication     WIFE STATES HE NEEDS CHILD ET TUBE DUE TO A CURVATURE OF HIS TRACHEA    Arthritis     Asthma     Atrial fibrillation     Bladder cancer     COPD (chronic obstructive pulmonary disease)     Diabetes mellitus     Diverticulitis     Enlarged prostate     Fatty liver     GERD (gastroesophageal reflux disease)     Hard to intubate     Hyperlipidemia     Hypertension     Pneumonia     Seasonal allergies     Skin cancer     Sleep apnea     NO CPAP MACHINE    Stage 4 chronic kidney disease     TIA (transient ischemic attack)      Past Surgical History:   Procedure Laterality Date    CARDIAC CATHETERIZATION      CARDIAC CATHETERIZATION N/A 2023    Procedure: Left Heart Cath;  Surgeon: Zofia Phillips MD;  Location: [facility] CATH INVASIVE LOCATION;  Service: Cardiovascular;  Laterality: N/A;    CARDIAC CATHETERIZATION N/A 2023    Procedure: Coronary angiography;  Surgeon: Zofia Phillips MD;  Location: [facility] CATH INVASIVE LOCATION;  Service: Cardiovascular;  Laterality: N/A;    CARDIAC CATHETERIZATION N/A 2023    Procedure: Left ventriculography;  Surgeon: Zofia Phillips MD;  Location: [facility] CATH INVASIVE LOCATION;  Service: Cardiovascular;   Laterality: N/A;    CERVICAL FUSION ANTERIOR WITH ARTIFICIAL DISCECTOMY IMPLANTATION      CHOLECYSTECTOMY      COLONOSCOPY      CYSTOSCOPY BLADDER BIOPSY      CYSTOSCOPY BLADDER BIOPSY Bilateral 1/6/2023    Procedure: CYSTOSCOPY BILATERAL RETROGRADE PYELOGRAM AND BLADDER BIOPSY;  Surgeon: Kong Maurer MD;  Location: [facility];  Service: Urology;  Laterality: Bilateral;    HEMORRHOIDECTOMY      X 2    HIP ENDOPROSTHESIS Right 12/31/2023    Procedure: HIP ENDOPROSTHESIS;  Surgeon: Francisco Pederson MD;  Location: [facility] OR;  Service: Orthopedics;  Laterality: Right;    HIP SURGERY Right     X 2 FOR BURSA REMOVAL    INGUINAL HERNIA REPAIR Right     LUMBAR DECOMPRESSION      LUMBAR LAMINECTOMY      TOE AMPUTATION Left     GREAT TOE      General Information       Row Name 01/03/24 1031          Physical Therapy Time and Intention    Document Type therapy note (daily note)  -     Mode of Treatment physical therapy  -       Row Name 01/03/24 1031          General Information    Existing Precautions/Restrictions hip, posterior;right  -PH     Barriers to Rehab medically complex  -PH       Row Name 01/03/24 1031          Cognition    Orientation Status (Cognition) oriented x 3  -PH       Row Name 01/03/24 1031          Safety Issues, Functional Mobility    Safety Issues Affecting Function (Mobility) insight into deficits/self-awareness  -     Impairments Affecting Function (Mobility) endurance/activity tolerance;pain;strength;range of motion (ROM);balance  -     Comment, Safety Issues/Impairments (Mobility) gt belt and non skid slippers  -PH               User Key  (r) = Recorded By, (t) = Taken By, (c) = Cosigned By      Initials Name Provider Type    PH Mary Andres PTA Physical Therapist Assistant                   Mobility       Row Name 01/03/24 1032          Bed Mobility    Bed Mobility supine-sit  -PH     Supine-Sit Isabella (Bed Mobility) moderate assist (50% patient  "effort);verbal cues;nonverbal cues (demo/gesture);2 person assist  -PH     Sit-Supine [place] (Bed Mobility) not tested  -PH     Comment, (Bed Mobility) cues for initiation and sequencing w/ extra time to EOB; pt's R LE supported as pt moved short distance toward L side of bed. post hip restrictions maintained.  -PH       Row Name 01/03/24 1032          Sit-Stand Transfer    Sit-Stand [place] (Transfers) minimum assist (75% patient effort);2 person assist;verbal cues;nonverbal cues (demo/gesture);moderate assist (50% patient effort)  -PH     Assistive Device (Sit-Stand Transfers) walker, front-wheeled  -PH     Comment, (Sit-Stand Transfer) from EOB w bed elevated approx 8\"; cues for b hand placement  -PH       Row Name 01/03/24 1032          Gait/Stairs (Locomotion)    Renville Level (Gait) minimum assist (75% patient effort);2 person assist;verbal cues;nonverbal cues (demo/gesture)  -PH     Assistive Device (Gait) walker, front-wheeled  -PH     Distance in Feet (Gait) approx 8-9 small steps to chair  -PH     Deviations/Abnormal Patterns (Gait) beba decreased;gait speed decreased;stride length decreased;antalgic;festinating/shuffling  -PH     [place] Level (Stairs) unable to assess  -PH     Comment, (Gait/Stairs) pt very slow and shuffling; cues for heavy use of B UE to offset weight from painful R LE  -PH               User Key  (r) = Recorded By, (t) = Taken By, (c) = Cosigned By      Initials Name Provider Type    Mary Nava PTA Physical Therapist Assistant                   Obj/Interventions       Row Name 01/03/24 1035          Motor Skills    Therapeutic Exercise other (see comments)  B LE: posterior JULIANE program x 10 reps  -PH       Row Name 01/03/24 1035          Balance    Balance Assessment sitting static balance;standing static balance  -PH     Static Sitting Balance standby assist  -PH     Static Standing Balance contact guard;minimal assist;2-person assist;verbal " cues;non-verbal cues (demo/gesture)  -PH     Position/Device Used, Standing Balance walker, front-wheeled  -PH               User Key  (r) = Recorded By, (t) = Taken By, (c) = Cosigned By      Initials Name Provider Type     Mary Andres PTA Physical Therapist Assistant                   Goals/Plan    No documentation.                  Clinical Impression       Row Name 01/03/24 1036          Pain    Pretreatment Pain Rating 4/10  -PH     Posttreatment Pain Rating 6/10  -PH     Pain Location - Side/Orientation Right  -PH     Pain Location - hip  -PH     Pain Intervention(s) Repositioned;Ambulation/increased activity  -PH       Row Name 01/03/24 1036          Plan of Care Review    Plan of Care Reviewed With patient  -PH     Progress improving  -PH     Outcome Evaluation Pt seen by PT this AM for tx. RN held pain meds w/ pt more reponsive to cues and directions today. Pt sat up to EOB w/ mod A x 2. Pt then stood req min / mod A x 2 and use of fww. Pt amb to chair taking approx 8-9 small steps req min A x 2 and use of fww.  Pain and fatigue limited distance. Pt performed posterior JULIANE program x 10 reps. Posterior hip restrictions maintained during session. Pt UIC at end of session. PT will prog as pt hemanth.  -PH       Row Name 01/03/24 1036          Vital Signs    O2 Delivery Pre Treatment room air  -PH     O2 Delivery Intra Treatment room air  -PH     O2 Delivery Post Treatment room air  -PH       Row Name 01/03/24 1036          Positioning and Restraints    Pre-Treatment Position in bed  -PH     Post Treatment Position chair  -PH     In Chair reclined;call light within reach;encouraged to call for assist;exit alarm on;notified nsg  -PH               User Key  (r) = Recorded By, (t) = Taken By, (c) = Cosigned By      Initials Name Provider Type     Mary Andres PTA Physical Therapist Assistant                   Outcome Measures       Row Name 01/03/24 1039 01/03/24 0852       How much help  from another person do you currently need...    Turning from your back to your side while in flat bed without using bedrails? 2  -PH 2  -HD    Moving from lying on back to sitting on the side of a flat bed without bedrails? 2  -PH 2  -HD    Moving to and from a bed to a chair (including a wheelchair)? 2  -PH 1  -HD    Standing up from a chair using your arms (e.g., wheelchair, bedside chair)? 2  -PH 2  -HD    Climbing 3-5 steps with a railing? 1  -PH 1  -HD    To walk in hospital room? 1  -PH 1  -HD    AM-PAC 6 Clicks Score (PT) 10  -PH 9  -HD    Highest Level of Mobility Goal 4 --> Transfer to chair/commode  -PH 3 --> Sit at edge of bed  -HD      Row Name 01/03/24 1039          Functional Assessment    Outcome Measure Options AM-PAC 6 Clicks Basic Mobility (PT)  -               User Key  (r) = Recorded By, (t) = Taken By, (c) = Cosigned By      Initials Name Provider Type     Mary Andres PTA Physical Therapist Assistant    Vanesa Ken, RN Registered Nurse                                 Physical Therapy Education       Title: PT OT SLP Therapies (Done)       Topic: Physical Therapy (Done)       Point: Mobility training (Done)       Learning Progress Summary             Patient Acceptance, E,TB,D, VU,NR by  at 1/3/2024 1040    Acceptance, E,TB,D, VU,NR by  at 1/2/2024 1141    Acceptance, E, VU by  at 1/1/2024 1326                                         User Key       Initials Effective Dates Name Provider Type Discipline     06/16/21 -  Mary Andres PTA Physical Therapist Assistant PT    TOYA 08/24/21 -  Brenda Madera PT Physical Therapist PT                  PT Recommendation and Plan     Plan of Care Reviewed With: patient  Progress: improving  Outcome Evaluation: Pt seen by PT this AM for tx. RN held pain meds w/ pt more reponsive to cues and directions today. Pt sat up to EOB w/ mod A x 2. Pt then stood req min / mod A x 2 and use of fww. Pt amb to chair taking  approx 8-9 small steps req min A x 2 and use of fww.  Pain and fatigue limited distance. Pt performed posterior JULIANE program x 10 reps. Posterior hip restrictions maintained during session. Pt UIC at end of session. PT will prog as pt hemanth.     Time Calculation:         PT Charges       Row Name 01/03/24 1040 01/03/24 0947          Time Calculation    Start Time 1020  -PH --     Stop Time 1043  -PH --     Time Calculation (min) 23 min  -PH --     PT Received On 01/03/24  -PH --     PT - Next Appointment 01/04/24  -PH 01/04/24  -PH        Timed Charges    49753 - PT Therapeutic Exercise Minutes 9  -PH --     90001 - PT Therapeutic Activity Minutes 14  -PH --        Total Minutes    Timed Charges Total Minutes 23  -PH --      Total Minutes 23  -PH --               User Key  (r) = Recorded By, (t) = Taken By, (c) = Cosigned By      Initials Name Provider Type     PrakashrMary, PTA Physical Therapist Assistant                  Therapy Charges for Today       Code Description Service Date Service Provider Modifiers Qty    12001427702 HC PT THER PROC EA 15 MIN 1/2/2024 Huckendubler, Mary, PTA GP 1    35009604778 HC PT THERAPEUTIC ACT EA 15 MIN 1/2/2024 H[place]ubler, Mary, PTA GP 1    73814074612 HC PT THER SUPP EA 15 MIN 1/2/2024 H[place]ler, Mary, PTA GP 2    28706825508 HC PT THER PROC EA 15 MIN 1/3/2024 H[place]ler, Mary, PTA GP 1    43034122017 HC PT THERAPEUTIC ACT EA 15 MIN 1/3/2024 [place]ler, Mary, PTA GP 1    06517485563 HC PT THER SUPP EA 15 MIN 1/3/2024 Huckendubler, Mary, PTA GP 2            PT G-Codes  Outcome Measure Options: AM-PAC 6 Clicks Basic Mobility (PT)  AM-PAC 6 Clicks Score (PT): 10  PT Discharge Summary  Anticipated Discharge Disposition (PT): skilled nursing facility    Mary Andres PTA  1/3/2024

## 2024-01-03 NOTE — DISCHARGE PLACEMENT REQUEST
"Humble Enriquez (78 y.o. Male)       Date of Birth   1945    Social Security Number       Address   [address] [place] [address]    Home Phone   [phone]    MRN   [id number]       Orthodox   None    Marital Status                               Admission Date   12/28/23    Admission Type   Emergency    Admitting Provider   Anabell Land MD    Attending Provider   Thaddeus Freedman MD    Department, Room/Bed   [address], [address]/1       Discharge Date       Discharge Disposition   Skilled Nursing Facility (DC - External)    Discharge Destination                                 Attending Provider: Thaddeus Freedman MD    Allergies: Hydrocodone-acetaminophen, Aspirin, Ibuprofen, Morphine    Isolation: None   Infection: None   Code Status: CPR    Ht: 180.3 cm (71\")   Wt: 78.9 kg (174 lb)    Admission Cmt: None   Principal Problem: Closed displaced fracture of right femoral neck [S72.001A]                   Active Insurance as of 12/28/2023       Primary Coverage       Payor Plan Insurance Group Employer/Plan Group    MEDICARE MEDICARE A & B        Payor Plan Address Payor Plan Phone Number Payor Plan Fax Number Effective Dates    [address] [phone]  12/1/2010 - None Entered    [facility] [id number]         Subscriber Name Subscriber Birth Date Member ID       HUMBLE ENRIQUEZ 1945 [id number]               Secondary Coverage       Payor Plan Insurance Group Employer/Plan Group    [employer]        Payor Plan Address Payor Plan Phone Number Payor Plan Fax Number Effective Dates    [address] [phone]  4/6/2017 - None Entered    [facility] [id number]         Subscriber Name Subscriber Birth Date Member ID       HUMBLE ENRIQUEZ 1945 [id number]                     Emergency Contacts        (Rel.) Home Phone Work Phone Mobile Phone    Caty Enriquez (Spouse) [phone] -- " [phone]                   Discharge Summary        Thaddeus Freedman MD at 01/03/24 1151                              [facility]    Date of Discharge:  1/3/2024    PCP: Dereck Miramontes MD    Discharge Diagnosis:   Active Hospital Problems    Diagnosis  POA    **Closed displaced fracture of right femoral neck [S72.001A]  Yes    CKD (chronic kidney disease) [N18.9]  Unknown    Atrial fibrillation [I48.91]  Unknown    HTN (hypertension) [I10]  Unknown    CHF (congestive heart failure) [I50.9]  Unknown    Anemia [D64.9]  Unknown    Type 2 diabetes mellitus [E11.9]  Unknown    PVD (peripheral vascular disease) [I73.9]  Unknown      Resolved Hospital Problems   No resolved problems to display.     Procedure(s):  HIP ENDOPROSTHESIS     Consults       Date and Time Order Name Status Description    1/2/2024  1:58 PM Inpatient Nephrology Consult      12/29/2023 10:14 AM Inpatient Cardiology Consult Completed     12/28/2023  6:05 PM Inpatient Orthopedic Surgery Consult Completed     12/28/2023  4:37 PM [facility] (on-call MD unless specified) Details      12/28/2023  4:37 PM Ortho (on-call MD unless specified) Completed           Hospital Course  78 y.o. male initially admitted after a fall at home which resulted in a right femoral neck fracture and patient underwent right hip bipolar arthroplasty per  on 12/31/2023 after receiving cardiac clearance with cardiology performed and a left-sided heart cath though no stents were placed.  He is back on his anticoagulation with Coumadin and his INR is subtherapeutic and he should remain on Lovenox at prophylactic dosing until therapeutic.  I wrote for 7-day supply of Lovenox but will defer to rehab MD to recheck his INR early next work week around Monday or Tuesday.  Other comorbidities with diabetes have remained relatively stable with an A1c of 8.7 and he is maintained on a basal bolus regimen.  This was implemented secondary to some  of his medications being nonformulary and I will resume his normal regimen post discharge.  He does have issues of CKD stage IV though he tolerated previous contrast per cardiology.  His creatinine is trending down by discharge to a level of 2.7.  Lasix had remained off and utilizes this as needed about 3 days/week and he is maintained on his ARB.  I had made a renal consult today prior to discharge but ultimately have discontinued that.  His anemia is multifactorial due to chronic disease compounded by acute blood loss postoperatively.  Hemoglobin stabilized to a level of 10.1 and otherwise improved up to 10.5 by discharge.  Vital signs remained stable as well as afebrile.  No postoperative hypotension has been appreciated.  Other comorbidities involving PVD is stable on Plavix, hyperlipidemia stable on statin, BPH stable on Flomax, and GERD stable PPI.  Given improvement in kidney function and overall stable vitals as well as overall clinical improvement with the patient, he is medically stable to be transition to SNF at this point in time as coordinated by CCP.  All questions answered the patient prior to disposition he was thankful for the care he received while here while minimal to the above plan.      Temp:  [97.3 °F (36.3 °C)-98.8 °F (37.1 °C)] 97.5 °F (36.4 °C)  Heart Rate:  [57-61] 59  Resp:  [18-20] 20  BP: (124-169)/(62-69) 169/65  Body mass index is 24.27 kg/m².    Physical Exam  HENT:      Head: Normocephalic.      Nose: Nose normal.      Mouth/Throat:      Mouth: Mucous membranes are moist.      Pharynx: Oropharynx is clear.   Cardiovascular:      Rate and Rhythm: Normal rate and regular rhythm.   Pulmonary:      Effort: Pulmonary effort is normal.      Breath sounds: Normal breath sounds.   Abdominal:      General: Bowel sounds are normal. There is no distension.      Palpations: Abdomen is soft.      Tenderness: There is no abdominal tenderness.   Musculoskeletal:         General: No swelling.    Neurological:      Mental Status: He is alert.      Cranial Nerves: No cranial nerve deficit.      Motor: Weakness present.      Gait: Gait abnormal.       Disposition: Skilled Nursing Facility (DC - External)       Discharge Medications        New Medications        Instructions Start Date   Enoxaparin Sodium 30 MG/0.3ML solution prefilled syringe syringe  Commonly known as: LOVENOX   30 mg, Subcutaneous, Every 24 Hours   Start Date: January 4, 2024            Changes to Medications        Instructions Start Date   furosemide 40 MG tablet  Commonly known as: LASIX  What changed: Another medication with the same name was removed. Continue taking this medication, and follow the directions you see here.   40 mg, Oral, Daily PRN      HYDROcodone-acetaminophen 5-325 MG per tablet  Commonly known as: NORCO  What changed: when to take this   1 tablet, Oral, Every 4 Hours PRN             Continue These Medications        Instructions Start Date   albuterol sulfate  (90 Base) MCG/ACT inhaler  Commonly known as: PROVENTIL HFA;VENTOLIN HFA;PROAIR HFA   2 puffs, Inhalation, As Needed      amLODIPine 5 MG tablet  Commonly known as: NORVASC   5 mg, Oral, Daily      atenolol 100 MG tablet  Commonly known as: TENORMIN   100 mg, Oral, Daily      atorvastatin 80 MG tablet  Commonly known as: LIPITOR   1 tablet, Oral, Daily      clopidogrel 75 MG tablet  Commonly known as: PLAVIX   1 tablet, Oral, Daily      cyclobenzaprine 10 MG tablet  Commonly known as: FLEXERIL   10 mg, Oral, 3 Times Daily PRN      gabapentin 300 MG capsule  Commonly known as: NEURONTIN   600 mg, Oral, 2 Times Daily      glimepiride 4 MG tablet  Commonly known as: AMARYL   4 mg, Oral, Every Morning      glimepiride 2 MG tablet  Commonly known as: AMARYL   2 mg, Oral, Nightly      glucose blood test strip   Use to test glucose 3 times per day. DX: E11.9 and Z79.4.      Insulin Aspart 100 UNIT/ML solution cartridge   Subcutaneous, See Admin Instructions,  Sliding Scale      Insulin Degludec 100 UNIT/ML solution injection  Commonly known as: TRESIBA   14 Units, Subcutaneous, Nightly      ipratropium 0.06 % nasal spray  Commonly known as: ATROVENT   2 sprays, Nasal, 4 Times Daily      ketorolac 0.5 % ophthalmic solution  Commonly known as: ACULAR   INSTILL 1 DROP THREE TIMES DAILY INTO LEFT EYE FOR 14 DAYS FOLLOWING SURGERY THEN STOP      losartan 100 MG tablet  Commonly known as: COZAAR   1 tablet, Oral, Daily      magnesium oxide 400 MG tablet  Commonly known as: MAG-OX   1 tablet, Oral, 2 Times Daily      moxifloxacin 0.5 % ophthalmic solution  Commonly known as: VIGAMOX   INSTILL 1 DROP INTO LEFT EYE THREE TIMES DAILY FOR 3 DAYS BEFORE SURGERY THEN USE EVERY HOUR WHILE AWAKE DAY OF SURGERY, THEN USE FOUR TIMES DAILY FOR 7 DAYS AFTER SURGERY THEN STOP      multivitamin with minerals tablet tablet   1 tablet, Oral, Daily      nicotine 7 MG/24HR patch  Commonly known as: NICODERM CQ   1 patch, Transdermal, Daily      pantoprazole 40 MG EC tablet  Commonly known as: PROTONIX   40 mg, Oral, Daily      prednisoLONE acetate 1 % ophthalmic suspension  Commonly known as: PRED FORTE   1 drop, Both Eyes, Daily      tamsulosin 0.4 MG capsule 24 hr capsule  Commonly known as: FLOMAX   1 capsule, Oral, 2 Times Daily      warfarin 3 MG tablet  Commonly known as: COUMADIN   3 mg, Oral, Daily Warfarin, 3mg x 6 days per week, 1.5mg on Saturdays             Stop These Medications      carvedilol 12.5 MG tablet  Commonly known as: COREG     cephalexin 500 MG capsule  Commonly known as: Keflex     docusate sodium 100 MG capsule     guaiFENesin 600 MG 12 hr tablet  Commonly known as: MUCINEX     promethazine-dextromethorphan 6.25-15 MG/5ML syrup  Commonly known as: PROMETHAZINE-DM     triamcinolone 0.1 % cream  Commonly known as: KENALOG              Activity Instructions       Patient May Shower; No Tub Baths, Pools or Hot Tubs             Additional Instructions for the Follow-ups  that You Need to Schedule       Apply Ice to Affected Area As Needed   As directed      Discharge Follow-up with PCP   As directed       Currently Documented PCP:    Dereck Miramontes MD    PCP Phone Number:    [phone]     Follow Up Details: PCP post rehab.  All consultants per their recommendations        Discharge Follow-up with Specified Provider: Dr. Pederson; 2 Weeks   As directed      To: Dr. Pederson   Follow Up: 2 Weeks   Follow Up Details: Patient instructed to call for follow-up after discharge from the hospital.        Remove Dressing   As directed      May remove Tegaderm dressing from right hip in 7 days.  Dressing is waterproof.  He is okay to shower.  May leave wound open to air or redress as needed after 7 days.    Order Comments: May remove Tegaderm dressing from right hip in 7 days.  Dressing is waterproof.  He is okay to shower.  May leave wound open to air or redress as needed after 7 days.                Contact information for follow-up providers       Francisco Pederson MD Follow up in 2 week(s).    Specialty: Orthopedic Surgery  Contact information:  [address] [facility] [place] [address] [phone]               Dereck Miramontes MD .    Specialty: Family Medicine  Why: PCP post rehab.  All consultants per their recommendations  Contact information:  [address] [place] [address] [address]  [facility] [address] [phone]                       Contact information for after-discharge care       Destination       [facility] .    Service: Skilled Nursing  Contact information:  [address] [facility] [address] [phone]                                No future appointments.     Thaddeus Freedman MD  [facility]  01/03/24    Discharge time spent greater than 30 minutes.      Electronically signed by Thaddeus Freedman MD at 01/03/24 1202       Discharge Order (From admission, onward)       Start     Ordered    01/03/24 1930   Discharge patient  Once        Expected Discharge Date: 01/03/24   Discharge Disposition: Skilled Nursing Facility (DC - External)   Physician of Record for Attribution - Please select from Treatment Team: CHARLINE BISHOP [[id number]]   Review needed by CMO to determine Physician of Record: No      Question Answer Comment   Physician of Record for Attribution - Please select from Treatment Team CHARLINE BISHOP    Review needed by CMO to determine Physician of Record No        01/03/24 1152

## 2024-01-03 NOTE — NURSING NOTE
Stretcher transport here. Patient and belongings gathered and assisted to stretcher. No distress at time of discharge.

## 2024-01-03 NOTE — DISCHARGE SUMMARY
[facility]    Date of Discharge:  1/3/2024    PCP: Dereck Miramontes MD    Discharge Diagnosis:   Active Hospital Problems    Diagnosis  POA    **Closed displaced fracture of right femoral neck [S72.001A]  Yes    CKD (chronic kidney disease) [N18.9]  Unknown    Atrial fibrillation [I48.91]  Unknown    HTN (hypertension) [I10]  Unknown    CHF (congestive heart failure) [I50.9]  Unknown    Anemia [D64.9]  Unknown    Type 2 diabetes mellitus [E11.9]  Unknown    PVD (peripheral vascular disease) [I73.9]  Unknown      Resolved Hospital Problems   No resolved problems to display.     Procedure(s):  HIP ENDOPROSTHESIS     Consults       Date and Time Order Name Status Description    1/2/2024  1:58 PM Inpatient Nephrology Consult      12/29/2023 10:14 AM Inpatient Cardiology Consult Completed     12/28/2023  6:05 PM Inpatient Orthopedic Surgery Consult Completed     12/28/2023  4:37 PM [facility] (on-call MD unless specified) Details      12/28/2023  4:37 PM Ortho (on-call MD unless specified) Completed           Hospital Course  78 y.o. male initially admitted after a fall at home which resulted in a right femoral neck fracture and patient underwent right hip bipolar arthroplasty per  on 12/31/2023 after receiving cardiac clearance with cardiology performed and a left-sided heart cath though no stents were placed.  He is back on his anticoagulation with Coumadin and his INR is subtherapeutic and he should remain on Lovenox at prophylactic dosing until therapeutic.  I wrote for 7-day supply of Lovenox but will defer to rehab MD to recheck his INR early next work week around Monday or Tuesday.  Other comorbidities with diabetes have remained relatively stable with an A1c of 8.7 and he is maintained on a basal bolus regimen.  This was implemented secondary to some of his medications being nonformulary and I will resume his normal regimen post discharge.  He does have  issues of CKD stage IV though he tolerated previous contrast per cardiology.  His creatinine is trending down by discharge to a level of 2.7.  Lasix had remained off and utilizes this as needed about 3 days/week and he is maintained on his ARB.  I had made a renal consult today prior to discharge but ultimately have discontinued that.  His anemia is multifactorial due to chronic disease compounded by acute blood loss postoperatively.  Hemoglobin stabilized to a level of 10.1 and otherwise improved up to 10.5 by discharge.  Vital signs remained stable as well as afebrile.  No postoperative hypotension has been appreciated.  Other comorbidities involving PVD is stable on Plavix, hyperlipidemia stable on statin, BPH stable on Flomax, and GERD stable PPI.  Given improvement in kidney function and overall stable vitals as well as overall clinical improvement with the patient, he is medically stable to be transition to SNF at this point in time as coordinated by CCP.  All questions answered the patient prior to disposition he was thankful for the care he received while here while minimal to the above plan.      Temp:  [97.3 °F (36.3 °C)-98.8 °F (37.1 °C)] 97.5 °F (36.4 °C)  Heart Rate:  [57-61] 59  Resp:  [18-20] 20  BP: (124-169)/(62-69) 169/65  Body mass index is 24.27 kg/m².    Physical Exam  HENT:      Head: Normocephalic.      Nose: Nose normal.      Mouth/Throat:      Mouth: Mucous membranes are moist.      Pharynx: Oropharynx is clear.   Cardiovascular:      Rate and Rhythm: Normal rate and regular rhythm.   Pulmonary:      Effort: Pulmonary effort is normal.      Breath sounds: Normal breath sounds.   Abdominal:      General: Bowel sounds are normal. There is no distension.      Palpations: Abdomen is soft.      Tenderness: There is no abdominal tenderness.   Musculoskeletal:         General: No swelling.   Neurological:      Mental Status: He is alert.      Cranial Nerves: No cranial nerve deficit.      Motor:  Weakness present.      Gait: Gait abnormal.       Disposition: Skilled Nursing Facility (DC - External)       Discharge Medications        New Medications        Instructions Start Date   Enoxaparin Sodium 30 MG/0.3ML solution prefilled syringe syringe  Commonly known as: LOVENOX   30 mg, Subcutaneous, Every 24 Hours   Start Date: January 4, 2024            Changes to Medications        Instructions Start Date   furosemide 40 MG tablet  Commonly known as: LASIX  What changed: Another medication with the same name was removed. Continue taking this medication, and follow the directions you see here.   40 mg, Oral, Daily PRN      HYDROcodone-acetaminophen 5-325 MG per tablet  Commonly known as: NORCO  What changed: when to take this   1 tablet, Oral, Every 4 Hours PRN             Continue These Medications        Instructions Start Date   albuterol sulfate  (90 Base) MCG/ACT inhaler  Commonly known as: PROVENTIL HFA;VENTOLIN HFA;PROAIR HFA   2 puffs, Inhalation, As Needed      amLODIPine 5 MG tablet  Commonly known as: NORVASC   5 mg, Oral, Daily      atenolol 100 MG tablet  Commonly known as: TENORMIN   100 mg, Oral, Daily      atorvastatin 80 MG tablet  Commonly known as: LIPITOR   1 tablet, Oral, Daily      clopidogrel 75 MG tablet  Commonly known as: PLAVIX   1 tablet, Oral, Daily      cyclobenzaprine 10 MG tablet  Commonly known as: FLEXERIL   10 mg, Oral, 3 Times Daily PRN      gabapentin 300 MG capsule  Commonly known as: NEURONTIN   600 mg, Oral, 2 Times Daily      glimepiride 4 MG tablet  Commonly known as: AMARYL   4 mg, Oral, Every Morning      glimepiride 2 MG tablet  Commonly known as: AMARYL   2 mg, Oral, Nightly      glucose blood test strip   Use to test glucose 3 times per day. DX: E11.9 and Z79.4.      Insulin Aspart 100 UNIT/ML solution cartridge   Subcutaneous, See Admin Instructions, Sliding Scale      Insulin Degludec 100 UNIT/ML solution injection  Commonly known as: TRESIBA   14 Units,  Subcutaneous, Nightly      ipratropium 0.06 % nasal spray  Commonly known as: ATROVENT   2 sprays, Nasal, 4 Times Daily      ketorolac 0.5 % ophthalmic solution  Commonly known as: ACULAR   INSTILL 1 DROP THREE TIMES DAILY INTO LEFT EYE FOR 14 DAYS FOLLOWING SURGERY THEN STOP      losartan 100 MG tablet  Commonly known as: COZAAR   1 tablet, Oral, Daily      magnesium oxide 400 MG tablet  Commonly known as: MAG-OX   1 tablet, Oral, 2 Times Daily      moxifloxacin 0.5 % ophthalmic solution  Commonly known as: VIGAMOX   INSTILL 1 DROP INTO LEFT EYE THREE TIMES DAILY FOR 3 DAYS BEFORE SURGERY THEN USE EVERY HOUR WHILE AWAKE DAY OF SURGERY, THEN USE FOUR TIMES DAILY FOR 7 DAYS AFTER SURGERY THEN STOP      multivitamin with minerals tablet tablet   1 tablet, Oral, Daily      nicotine 7 MG/24HR patch  Commonly known as: NICODERM CQ   1 patch, Transdermal, Daily      pantoprazole 40 MG EC tablet  Commonly known as: PROTONIX   40 mg, Oral, Daily      prednisoLONE acetate 1 % ophthalmic suspension  Commonly known as: PRED FORTE   1 drop, Both Eyes, Daily      tamsulosin 0.4 MG capsule 24 hr capsule  Commonly known as: FLOMAX   1 capsule, Oral, 2 Times Daily      warfarin 3 MG tablet  Commonly known as: COUMADIN   3 mg, Oral, Daily Warfarin, 3mg x 6 days per week, 1.5mg on Saturdays             Stop These Medications      carvedilol 12.5 MG tablet  Commonly known as: COREG     cephalexin 500 MG capsule  Commonly known as: Keflex     docusate sodium 100 MG capsule     guaiFENesin 600 MG 12 hr tablet  Commonly known as: MUCINEX     promethazine-dextromethorphan 6.25-15 MG/5ML syrup  Commonly known as: PROMETHAZINE-DM     triamcinolone 0.1 % cream  Commonly known as: KENALOG              Activity Instructions       Patient May Shower; No Tub Baths, Pools or Hot Tubs             Additional Instructions for the Follow-ups that You Need to Schedule       Apply Ice to Affected Area As Needed   As directed      Discharge Follow-up  with PCP   As directed       Currently Documented PCP:    Dereck Miramontes MD    PCP Phone Number:    [phone]     Follow Up Details: PCP post rehab.  All consultants per their recommendations        Discharge Follow-up with Specified Provider: Dr. Pederson; 2 Weeks   As directed      To: Dr. Pederson   Follow Up: 2 Weeks   Follow Up Details: Patient instructed to call for follow-up after discharge from the hospital.        Remove Dressing   As directed      May remove Tegaderm dressing from right hip in 7 days.  Dressing is waterproof.  He is okay to shower.  May leave wound open to air or redress as needed after 7 days.    Order Comments: May remove Tegaderm dressing from right hip in 7 days.  Dressing is waterproof.  He is okay to shower.  May leave wound open to air or redress as needed after 7 days.                Contact information for follow-up providers       Francisco Pederson MD Follow up in 2 week(s).    Specialty: Orthopedic Surgery  Contact information:  [address] [facility] [address]  [phone]               Dereck Miramontes MD .    Specialty: Family Medicine  Why: PCP post rehab.  All consultants per their recommendations  Contact information:  [address] [place] [address] [address]  [facility] [address] [phone]                       Contact information for after-discharge care       Destination       [facility] .    Service: Skilled Nursing  Contact information:  [address] [facility] [place] [address] [phone]                                No future appointments.     Thaddeus Freedman MD  [facility]  01/03/24    Discharge time spent greater than 30 minutes.

## 2024-01-03 NOTE — CASE MANAGEMENT/SOCIAL WORK
Continued Stay Note  [facility]     Patient Name: Humble Rios  MRN: [id number]  Today's Date: 1/3/2024    Admit Date: 12/28/2023    Plan: DC to [place], [facility] to provide transportation   Discharge Plan       Row Name 01/03/24 1130       Plan    Plan DC to [place], [facility] to provide transportation    Plan Comments DC to [place], [facility] to provide transportation. Informed spouse, Caty Rios, of dispositon plans. Contacted Elvira Parra and informed of discposition plans. Packet to nurse.                   Discharge Codes    No documentation.                 Expected Discharge Date and Time       Expected Discharge Date Expected Discharge Time    Michael 3, 2024               Yahaira Tillman, RN

## 2024-01-03 NOTE — NURSING NOTE
Report called to Marisela AGUDELO at [facility]. All questions answered. Patient and family aware of  at 1200pm.

## 2024-01-03 NOTE — PLAN OF CARE
Goal Outcome Evaluation:  Plan of Care Reviewed With: patient        Progress: improving  Outcome Evaluation: Pt seen by PT this AM for tx. RN held pain meds w/ pt more reponsive to cues and directions today. Pt sat up to EOB w/ mod A x 2. Pt then stood req min / mod A x 2 and use of fww. Pt amb to chair taking approx 8-9 small steps req min A x 2 and use of fww.  Pain and fatigue limited distance. Pt performed posterior JULIANE program x 10 reps. Posterior hip restrictions maintained during session. Pt UIC at end of session. PT will prog as pt hemanth.      Anticipated Discharge Disposition (PT): skilled nursing facility

## 2024-01-03 NOTE — PLAN OF CARE
Goal Outcome Evaluation:  Plan of Care Reviewed With: patient, spouse        Progress: improving  Outcome Evaluation: Patient alert and oriented this shift. Up to chair with therapy and did better today. Dressing remains c/d/i to right hip. Patient cleared for discharge today to SNF. Appetite and fluid intake adequate. O2 continues to be weaned, currently on 1L during sleep. Vital signs stable. Call light in reach. Safety maintained.

## 2024-01-03 NOTE — CASE MANAGEMENT/SOCIAL WORK
Case Management Discharge Note      Final Note: DC to [facility] to provide transportation    Provided Post Acute Provider List?: Yes  Post Acute Provider List: Home Health, Nursing Home  Provided Post Acute Provider Quality & Resource List?: Yes  Post Acute Provider Quality and Resource List: Nursing Home  Delivered To: Support Person  Support Person: Wife: Caty  Method of Delivery: In person    Selected Continued Care - Discharged on 1/3/2024 Admission date: 12/28/2023 - Discharge disposition: Skilled Nursing Facility (DC - External)      Destination Coordination complete.      Service Provider Selected Services Address Phone Fax Patient Preferred    [facility] [address] [facility] [id number] [phone] --              Durable Medical Equipment    No services have been selected for the patient.                Dialysis/Infusion    No services have been selected for the patient.                Home Medical Care    No services have been selected for the patient.                Therapy    No services have been selected for the patient.                Community Resources    No services have been selected for the patient.                Community & DME    No services have been selected for the patient.                    Transportation Services  Ambulance:  ([facility])    Final Discharge Disposition Code: 03 - skilled nursing facility (SNF)

## 2024-01-03 NOTE — SIGNIFICANT NOTE
01/03/24 0947   OTHER   Discipline physical therapy assistant   Rehab Time/Intention   Session Not Performed patient/family declined, not feeling well  (Pt will be held today w/ HR varying to as high as 147 at rest in bed. Pt is scheduled to be FELIBERTO for procedure this afternoon. PT will follow up tomorrow.)   Recommendation   PT - Next Appointment 01/04/24

## 2024-01-03 NOTE — PROGRESS NOTES
[facility]: Warfarin Dosing/Monitoring Consult    Humble Rios is a 78 y.o. male, estimated creatinine clearance is 25.1 mL/min (A) (by C-G formula based on SCr of 2.71 mg/dL (H)). weighing 78.9 kg (174 lb).    Results from last 7 days   Lab Units 01/03/24  0342 01/02/24  0341 01/01/24  0435 12/31/23  1137 12/30/23  0421   INR  1.10 1.14* 1.06 1.08 1.40*   HEMOGLOBIN g/dL 10.5* 10.1* 11.1* 12.0* 11.3*   HEMATOCRIT % 31.5* 31.2* 33.8* 37.6 34.9*   PLATELETS 10*3/mm3 208 228 239 237 221     Prior to admission anticoagulation: per [facility] anticoag visit note on 12/18/23: [facility]     Hospital Anticoagulation:  Consulting provider: Dr. Pederson  Start date: on warfarin PTA  Indication: A Fib - requiring full anticoagulation  Target INR: 2 - 3  Expected duration: indefinite   Bridge Therapy: Yes  with enoxaparin prophylactic dose -- to continue until INR between 2-3    Potential food or drug interactions:   - Vitamin K 5mg PO x1 dose given 12/29 for warfarin reversal prior to surgery  - Clopidogrel: Increased bleeding risk ([id number] med; currently ordered)   - Glimepiride: Sulfonylureas may enhance the anticoagulant effect of warfarin. Warfarin may enhance the hypoglycemic effect of Sulfonylureas ([id number]-nr-epizmlzhu med; NOT currently ordered this admission)    Education complete?/Date: No, pt on warfarin PTA    Assessment/Plan:  INR decreased slightly this AM at 1.10. Will give warfarin 5 mg PO x 1 dose today then follow up with INR tomorrow  Monitor for any signs or symptoms of bleeding  Follow up daily INRs and dose adjustments    Pharmacy will continue to follow until discharge or discontinuation of warfarin.     Herminia Gallegos, PharmD  Clinical Pharmacist

## 2024-01-22 ENCOUNTER — HOME HEALTH ADMISSION (OUTPATIENT)
Dept: HOME HEALTH SERVICES | Facility: HOME HEALTHCARE | Age: 79
End: 2024-01-22
Payer: MEDICARE

## 2024-05-06 PROBLEM — M79.662 BILATERAL CALF PAIN: Status: ACTIVE | Noted: 2024-05-06

## 2024-05-06 PROBLEM — I77.9 CAROTID ARTERY DISEASE: Status: ACTIVE | Noted: 2024-05-06

## 2024-05-06 PROBLEM — M79.669 CALF PAIN: Status: ACTIVE | Noted: 2024-05-06

## 2024-05-06 PROBLEM — M79.661 BILATERAL CALF PAIN: Status: ACTIVE | Noted: 2024-05-06

## 2024-07-01 ENCOUNTER — TRANSCRIBE ORDERS (OUTPATIENT)
Age: 79
End: 2024-07-01
Payer: MEDICARE

## 2024-07-01 DIAGNOSIS — N18.4 CHRONIC KIDNEY DISEASE, STAGE IV (SEVERE): Primary | ICD-10-CM

## 2024-07-01 DIAGNOSIS — N18.4 STAGE 4 CHRONIC KIDNEY DISEASE: Primary | ICD-10-CM

## 2024-12-26 NOTE — PROGRESS NOTES
"Chief Complaint  Hemodialysis Access    Subjective      HPI: Humble Rios is a 79 y.o. male with history of chronic kidney disease referred for chronic internal hemodialysis access.  Several year history of CKD, progressive.  Right-handed.  Never dialyzed, no previous hemodialysis access procedures.  No pacer or ICD generator.  On warfarin.    Objective   Vital Signs:  /70   Ht 180.3 cm (71\")   Wt 79.8 kg (176 lb)   BMI 24.55 kg/m²   Estimated body mass index is 24.55 kg/m² as calculated from the following:    Height as of this encounter: 180.3 cm (71\").    Weight as of this encounter: 79.8 kg (176 lb).   BMI is within normal parameters. No other follow-up for BMI required.   Humble Rios  reports that he has been smoking cigarettes. He started smoking about 72 years ago. He has a 17.5 pack-year smoking history. He has never used smokeless tobacco..     Exam: Appropriately proportioned.  Easily palpable upper extremity pulses.  No veins suitable for AV access.  No upper extremity edema.  No subcutaneous venous prominence about the neck and chest.    Personal review of data: Images and tracings of upper extremity vascular examination for hemodialysis access performed today.  Progress note nephrology with Dr. Colt Ward 6/25/2024.  Renal function panel 11/15/2024 demonstrated EGFR 17.     Assessment and Plan     Diagnoses and all orders for this visit:    1. Stage 4 chronic kidney disease (Primary)    2. Chronic anticoagulation    Other orders  -     glimepiride (AMARYL) 4 MG tablet; Take 1 tablet by mouth 2 (Two) Times a Day.    Summary: 79-year-old gentleman with history of chronic kidney disease stage IV likely due to diabetes and hypertension, referred for chronic internal hemodialysis access.  Takes warfarin.  Plavix recently discontinued by his orthopedist.  Vein map performed today demonstrates small cephalic veins bilaterally, probably adequate on both sides.  Brachial arteries patent with " multiphasic arterial Doppler signals bilaterally.  From an anatomic perspective he is a candidate for creation of left brachiocephalic arteriovenous fistula.  I discussed the duplex findings, management options and the available procedure.  I described the benefits, risks and alternatives and answered all questions.  He does not want to proceed with access but wants to discuss further with his nephrologist.  Spent a fair amount of time cleaning up his MAR, which was all messed up.  Will see again on request.    Follow Up     No follow-ups on file.  Patient was given instructions and counseling regarding his condition or for health maintenance advice. Please see specific information pulled into the AVS if appropriate.

## 2024-12-27 ENCOUNTER — OFFICE VISIT (OUTPATIENT)
Age: 79
End: 2024-12-27
Payer: MEDICARE

## 2024-12-27 ENCOUNTER — HOSPITAL ENCOUNTER (OUTPATIENT)
Facility: HOSPITAL | Age: 79
Discharge: HOME | End: 2024-12-27
Payer: MEDICARE

## 2024-12-27 VITALS
DIASTOLIC BLOOD PRESSURE: 70 MMHG | WEIGHT: 176 LBS | HEIGHT: 71 IN | SYSTOLIC BLOOD PRESSURE: 122 MMHG | BODY MASS INDEX: 24.64 KG/M2

## 2024-12-27 DIAGNOSIS — N18.4 STAGE 4 CHRONIC KIDNEY DISEASE: ICD-10-CM

## 2024-12-27 DIAGNOSIS — Z79.01 CHRONIC ANTICOAGULATION: ICD-10-CM

## 2024-12-27 DIAGNOSIS — N18.4 STAGE 4 CHRONIC KIDNEY DISEASE: Primary | ICD-10-CM

## 2024-12-27 LAB
BH CV UPPER VENOUS LEFT AXILLARY AUGMENT: NORMAL
BH CV UPPER VENOUS LEFT AXILLARY COMPRESS: NORMAL
BH CV UPPER VENOUS LEFT AXILLARY PHASIC: NORMAL
BH CV UPPER VENOUS LEFT AXILLARY SPONT: NORMAL
BH CV UPPER VENOUS LEFT BASILIC FOREARM COMPRESS: NORMAL
BH CV UPPER VENOUS LEFT BASILIC UPPER COMPRESS: NORMAL
BH CV UPPER VENOUS LEFT BRACHIAL COMPRESS: NORMAL
BH CV UPPER VENOUS LEFT CEPHALIC FOREARM COMPRESS: NORMAL
BH CV UPPER VENOUS LEFT CEPHALIC UPPER COMPRESS: NORMAL
BH CV UPPER VENOUS LEFT INTERNAL JUGULAR AUGMENT: NORMAL
BH CV UPPER VENOUS LEFT INTERNAL JUGULAR COMPRESS: NORMAL
BH CV UPPER VENOUS LEFT INTERNAL JUGULAR PHASIC: NORMAL
BH CV UPPER VENOUS LEFT INTERNAL JUGULAR SPONT: NORMAL
BH CV UPPER VENOUS LEFT SUBCLAVIAN AUGMENT: NORMAL
BH CV UPPER VENOUS LEFT SUBCLAVIAN COMPRESS: NORMAL
BH CV UPPER VENOUS LEFT SUBCLAVIAN PHASIC: NORMAL
BH CV UPPER VENOUS LEFT SUBCLAVIAN SPONT: NORMAL
BH CV UPPER VENOUS RIGHT AXILLARY AUGMENT: NORMAL
BH CV UPPER VENOUS RIGHT AXILLARY COMPRESS: NORMAL
BH CV UPPER VENOUS RIGHT AXILLARY PHASIC: NORMAL
BH CV UPPER VENOUS RIGHT AXILLARY SPONT: NORMAL
BH CV UPPER VENOUS RIGHT BASILIC FOREARM COMPRESS: NORMAL
BH CV UPPER VENOUS RIGHT BASILIC UPPER COMPRESS: NORMAL
BH CV UPPER VENOUS RIGHT BRACHIAL COMPRESS: NORMAL
BH CV UPPER VENOUS RIGHT CEPHALIC FOREARM COMPRESS: NORMAL
BH CV UPPER VENOUS RIGHT CEPHALIC UPPER COMPRESS: NORMAL
BH CV UPPER VENOUS RIGHT INTERNAL JUGULAR AUGMENT: NORMAL
BH CV UPPER VENOUS RIGHT INTERNAL JUGULAR COMPRESS: NORMAL
BH CV UPPER VENOUS RIGHT INTERNAL JUGULAR PHASIC: NORMAL
BH CV UPPER VENOUS RIGHT INTERNAL JUGULAR SPONT: NORMAL
BH CV UPPER VENOUS RIGHT SUBCLAVIAN AUGMENT: NORMAL
BH CV UPPER VENOUS RIGHT SUBCLAVIAN COMPRESS: NORMAL
BH CV UPPER VENOUS RIGHT SUBCLAVIAN PHASIC: NORMAL
BH CV UPPER VENOUS RIGHT SUBCLAVIAN SPONT: NORMAL
BH CV VAS MEAS BASILIC ANTECUBITAL FOSSA LEFT: 0.19 CM
BH CV VAS MEAS BASILIC ANTECUBITAL FOSSA RIGHT: 0.21 CM
BH CV VAS MEAS BASILIC FOREARM LEFT - PROX: 0.22 CM
BH CV VAS MEAS BASILIC FOREARM RIGHT - DIST: 0.22 CM
BH CV VAS MEAS BASILIC FOREARM RIGHT - MID: 0.18 CM
BH CV VAS MEAS BASILIC FOREARM RIGHT - PROX: 0.2 CM
BH CV VAS MEAS BASILIC UPPER ARM LEFT - DIST: 0.19 CM
BH CV VAS MEAS BASILIC UPPER ARM LEFT - MID: 0.26 CM
BH CV VAS MEAS BASILIC UPPER ARM LEFT - PROX: 0.32 CM
BH CV VAS MEAS BASILIC UPPER ARM RIGHT - DIST: 0.37 CM
BH CV VAS MEAS BASILIC UPPER ARM RIGHT - MID: 0.38 CM
BH CV VAS MEAS CEPHALIC ANTECUBITAL FOSSA LEFT: 0.21 CM
BH CV VAS MEAS CEPHALIC ANTECUBITAL FOSSA RIGHT: 0.26 CM
BH CV VAS MEAS CEPHALIC FOREARM LEFT - DIST: 0.19 CM
BH CV VAS MEAS CEPHALIC FOREARM LEFT - MID: 0.19 CM
BH CV VAS MEAS CEPHALIC FOREARM LEFT - PROX: 0.22 CM
BH CV VAS MEAS CEPHALIC FOREARM RIGHT - DIST: 0.18 CM
BH CV VAS MEAS CEPHALIC FOREARM RIGHT - MID: 0.23 CM
BH CV VAS MEAS CEPHALIC FOREARM RIGHT - PROX: 0.23 CM
BH CV VAS MEAS CEPHALIC UPPER ARM LEFT - DIST: 0.22 CM
BH CV VAS MEAS CEPHALIC UPPER ARM LEFT - MID: 0.27 CM
BH CV VAS MEAS CEPHALIC UPPER ARM LEFT - PROX: 0.23 CM
BH CV VAS MEAS CEPHALIC UPPER ARM RIGHT - DIST: 0.23 CM
BH CV VAS MEAS CEPHALIC UPPER ARM RIGHT - MID: 0.25 CM
BH CV VAS MEAS CEPHALIC UPPER ARM RIGHT - PROX: 0.23 CM
BH CV VAS MEAS RADIAL UPPER ARM LEFT - DIST: 0.24 CM
BH CV VAS MEAS RADIAL UPPER ARM LEFT - MID: 0.27 CM
BH CV VAS MEAS RADIAL UPPER ARM LEFT - PROX: 0.35 CM
BH CV VAS MEAS RADIAL UPPER ARM RIGHT - DIST: 0.21 CM
BH CV VAS MEAS RADIAL UPPER ARM RIGHT - MID: 0.26 CM
BH CV VAS MEAS RADIAL UPPER ARM RIGHT - PROX: 0.27 CM
UPPER ARTERIAL LEFT ARM BRACHIAL LENGTH: 0.5 CM
UPPER ARTERIAL RIGHT ARM BRACHIAL LENGTH: 0.49 CM

## 2024-12-27 PROCEDURE — 93985 DUP-SCAN HEMO COMPL BI STD: CPT

## 2024-12-27 RX ORDER — ERGOCALCIFEROL 1.25 MG/1
1 CAPSULE ORAL WEEKLY
COMMUNITY
Start: 2024-11-19

## 2024-12-27 RX ORDER — PREGABALIN 75 MG/1
75 CAPSULE ORAL 2 TIMES DAILY
COMMUNITY

## 2024-12-27 RX ORDER — GLIMEPIRIDE 4 MG/1
4 TABLET ORAL 2 TIMES DAILY
Status: SHIPPED
Start: 2024-12-27

## 2025-07-25 ENCOUNTER — TELEPHONE (OUTPATIENT)
Age: 80
End: 2025-07-25
Payer: MEDICARE

## 2025-07-25 NOTE — TELEPHONE ENCOUNTER
Patient called inquiring whether a referral had been sent over from Dr. Arcos's office for fistula.  I checked patient's chart and no referral.  Nothing from on-base either.      There is a vein mapping from 2024, I told the patient they  in six months so he would probably need a limited mapping but not a full vein mapping again but would be dependent on Dr. Olivo.     He is going to call Dr. Arcos's office Monday and have referral resent.

## 2025-08-15 ENCOUNTER — OFFICE VISIT (OUTPATIENT)
Age: 80
End: 2025-08-15
Payer: MEDICARE

## 2025-08-15 VITALS
HEIGHT: 71 IN | HEART RATE: 63 BPM | DIASTOLIC BLOOD PRESSURE: 68 MMHG | BODY MASS INDEX: 25.2 KG/M2 | WEIGHT: 180 LBS | SYSTOLIC BLOOD PRESSURE: 126 MMHG | RESPIRATION RATE: 19 BRPM

## 2025-08-15 DIAGNOSIS — N18.4 CHRONIC RENAL DISEASE, STAGE IV: Primary | ICD-10-CM

## 2025-08-15 DIAGNOSIS — Z79.01 CHRONIC ANTICOAGULATION: ICD-10-CM

## 2025-08-21 ENCOUNTER — TELEPHONE (OUTPATIENT)
Age: 80
End: 2025-08-21
Payer: MEDICARE

## 2025-08-21 ENCOUNTER — DOCUMENTATION (OUTPATIENT)
Age: 80
End: 2025-08-21
Payer: MEDICARE

## 2025-08-21 ENCOUNTER — PRE-ADMISSION TESTING (OUTPATIENT)
Dept: PREADMISSION TESTING | Facility: HOSPITAL | Age: 80
End: 2025-08-21
Payer: MEDICARE

## 2025-08-22 ENCOUNTER — TELEPHONE (OUTPATIENT)
Age: 80
End: 2025-08-22
Payer: MEDICARE

## 2025-08-25 ENCOUNTER — ANESTHESIA EVENT (OUTPATIENT)
Dept: PERIOP | Facility: HOSPITAL | Age: 80
End: 2025-08-25
Payer: MEDICARE

## 2025-08-25 ENCOUNTER — HOSPITAL ENCOUNTER (OUTPATIENT)
Facility: HOSPITAL | Age: 80
Setting detail: HOSPITAL OUTPATIENT SURGERY
Discharge: HOME | End: 2025-08-25
Attending: SURGERY | Admitting: SURGERY
Payer: MEDICARE

## 2025-08-25 ENCOUNTER — ANESTHESIA (OUTPATIENT)
Dept: PERIOP | Facility: HOSPITAL | Age: 80
End: 2025-08-25
Payer: MEDICARE

## 2025-08-25 VITALS
RESPIRATION RATE: 16 BRPM | HEART RATE: 68 BPM | DIASTOLIC BLOOD PRESSURE: 62 MMHG | OXYGEN SATURATION: 93 % | TEMPERATURE: 97.6 F | SYSTOLIC BLOOD PRESSURE: 135 MMHG

## 2025-08-25 DIAGNOSIS — N18.4 CHRONIC RENAL DISEASE, STAGE IV: ICD-10-CM

## 2025-08-25 LAB
ANION GAP SERPL CALCULATED.3IONS-SCNC: 12.4 MMOL/L (ref 5–15)
BUN SERPL-MCNC: 38 MG/DL (ref 8–23)
BUN/CREAT SERPL: 9.5 (ref 7–25)
CALCIUM SPEC-SCNC: 9.1 MG/DL (ref 8.6–10.5)
CHLORIDE SERPL-SCNC: 105 MMOL/L (ref 98–107)
CO2 SERPL-SCNC: 20.6 MMOL/L (ref 22–29)
CREAT SERPL-MCNC: 4.02 MG/DL (ref 0.76–1.27)
EGFRCR SERPLBLD CKD-EPI 2021: 14.4 ML/MIN/1.73
GLUCOSE BLDC GLUCOMTR-MCNC: 146 MG/DL (ref 65–99)
GLUCOSE BLDC GLUCOMTR-MCNC: 172 MG/DL (ref 65–99)
GLUCOSE SERPL-MCNC: 163 MG/DL (ref 65–99)
INR PPP: 1.6 (ref 0.9–1.1)
POTASSIUM SERPL-SCNC: 5.1 MMOL/L (ref 3.5–5.2)
PROTHROMBIN TIME: 19.1 SECONDS (ref 11.7–14.2)
SODIUM SERPL-SCNC: 138 MMOL/L (ref 136–145)

## 2025-08-25 PROCEDURE — 85610 PROTHROMBIN TIME: CPT | Performed by: SURGERY

## 2025-08-25 PROCEDURE — 25010000002 SUGAMMADEX 200 MG/2ML SOLUTION

## 2025-08-25 PROCEDURE — 25010000002 FAMOTIDINE 10 MG/ML SOLUTION: Performed by: ANESTHESIOLOGY

## 2025-08-25 PROCEDURE — 25010000002 HEPARIN (PORCINE) PER 1000 UNITS

## 2025-08-25 PROCEDURE — 82948 REAGENT STRIP/BLOOD GLUCOSE: CPT

## 2025-08-25 PROCEDURE — 25010000002 ONDANSETRON PER 1 MG

## 2025-08-25 PROCEDURE — 25810000003 SODIUM CHLORIDE 0.9 % SOLUTION: Performed by: ANESTHESIOLOGY

## 2025-08-25 PROCEDURE — 25010000002 LIDOCAINE 2% SOLUTION

## 2025-08-25 PROCEDURE — 25010000002 FENTANYL CITRATE (PF) 50 MCG/ML SOLUTION

## 2025-08-25 PROCEDURE — 25010000002 HEPARIN (PORCINE) PER 1000 UNITS: Performed by: SURGERY

## 2025-08-25 PROCEDURE — 25010000002 MIDAZOLAM PER 1 MG: Performed by: ANESTHESIOLOGY

## 2025-08-25 PROCEDURE — 25010000002 BUPIVACAINE (PF) 0.25 % SOLUTION 30 ML VIAL: Performed by: SURGERY

## 2025-08-25 PROCEDURE — 25010000002 DEXAMETHASONE SODIUM PHOSPHATE 20 MG/5ML SOLUTION

## 2025-08-25 PROCEDURE — 80048 BASIC METABOLIC PNL TOTAL CA: CPT | Performed by: SURGERY

## 2025-08-25 PROCEDURE — 25010000002 CEFAZOLIN PER 500 MG: Performed by: SURGERY

## 2025-08-25 PROCEDURE — 36821 AV FUSION DIRECT ANY SITE: CPT | Performed by: SURGERY

## 2025-08-25 PROCEDURE — 25010000002 PROPOFOL 10 MG/ML EMULSION

## 2025-08-25 PROCEDURE — 25010000002 PHENYLEPHRINE 10 MG/ML SOLUTION

## 2025-08-25 PROCEDURE — 25010000002 LIDOCAINE 1 % SOLUTION 20 ML VIAL: Performed by: SURGERY

## 2025-08-25 PROCEDURE — 25010000002 PROTAMINE SULFATE PER 10 MG

## 2025-08-25 DEVICE — ABSORBABLE HEMOSTAT (OXIDIZED REGENERATED CELLULOSE, U.S.P.)
Type: IMPLANTABLE DEVICE | Site: ARM | Status: FUNCTIONAL
Brand: SURGICEL FIBRILLAR

## 2025-08-25 RX ORDER — LABETALOL HYDROCHLORIDE 5 MG/ML
5 INJECTION, SOLUTION INTRAVENOUS
Status: DISCONTINUED | OUTPATIENT
Start: 2025-08-25 | End: 2025-08-25 | Stop reason: HOSPADM

## 2025-08-25 RX ORDER — FAMOTIDINE 10 MG/ML
20 INJECTION, SOLUTION INTRAVENOUS ONCE
Status: COMPLETED | OUTPATIENT
Start: 2025-08-25 | End: 2025-08-25

## 2025-08-25 RX ORDER — TRAMADOL HYDROCHLORIDE 50 MG/1
50 TABLET ORAL EVERY 6 HOURS PRN
Qty: 8 TABLET | Refills: 0 | Status: SHIPPED | OUTPATIENT
Start: 2025-08-25

## 2025-08-25 RX ORDER — LIDOCAINE HYDROCHLORIDE 20 MG/ML
INJECTION, SOLUTION INFILTRATION; PERINEURAL AS NEEDED
Status: DISCONTINUED | OUTPATIENT
Start: 2025-08-25 | End: 2025-08-25 | Stop reason: SURG

## 2025-08-25 RX ORDER — MAGNESIUM HYDROXIDE 1200 MG/15ML
LIQUID ORAL AS NEEDED
Status: DISCONTINUED | OUTPATIENT
Start: 2025-08-25 | End: 2025-08-25 | Stop reason: HOSPADM

## 2025-08-25 RX ORDER — EPHEDRINE SULFATE 50 MG/ML
5 INJECTION, SOLUTION INTRAVENOUS ONCE AS NEEDED
Status: DISCONTINUED | OUTPATIENT
Start: 2025-08-25 | End: 2025-08-25 | Stop reason: HOSPADM

## 2025-08-25 RX ORDER — TRAMADOL HYDROCHLORIDE 50 MG/1
50 TABLET ORAL ONCE
Status: COMPLETED | OUTPATIENT
Start: 2025-08-25 | End: 2025-08-25

## 2025-08-25 RX ORDER — FENTANYL CITRATE 50 UG/ML
25 INJECTION, SOLUTION INTRAMUSCULAR; INTRAVENOUS
Status: DISCONTINUED | OUTPATIENT
Start: 2025-08-25 | End: 2025-08-25 | Stop reason: HOSPADM

## 2025-08-25 RX ORDER — ONDANSETRON 2 MG/ML
INJECTION INTRAMUSCULAR; INTRAVENOUS AS NEEDED
Status: DISCONTINUED | OUTPATIENT
Start: 2025-08-25 | End: 2025-08-25 | Stop reason: SURG

## 2025-08-25 RX ORDER — SODIUM CHLORIDE 9 MG/ML
9 INJECTION, SOLUTION INTRAVENOUS CONTINUOUS PRN
Status: DISCONTINUED | OUTPATIENT
Start: 2025-08-25 | End: 2025-08-25 | Stop reason: HOSPADM

## 2025-08-25 RX ORDER — DEXAMETHASONE SODIUM PHOSPHATE 4 MG/ML
INJECTION, SOLUTION INTRA-ARTICULAR; INTRALESIONAL; INTRAMUSCULAR; INTRAVENOUS; SOFT TISSUE AS NEEDED
Status: DISCONTINUED | OUTPATIENT
Start: 2025-08-25 | End: 2025-08-25 | Stop reason: SURG

## 2025-08-25 RX ORDER — IPRATROPIUM BROMIDE AND ALBUTEROL SULFATE 2.5; .5 MG/3ML; MG/3ML
3 SOLUTION RESPIRATORY (INHALATION) ONCE AS NEEDED
Status: DISCONTINUED | OUTPATIENT
Start: 2025-08-25 | End: 2025-08-25 | Stop reason: HOSPADM

## 2025-08-25 RX ORDER — FLUMAZENIL 0.1 MG/ML
0.2 INJECTION INTRAVENOUS AS NEEDED
Status: DISCONTINUED | OUTPATIENT
Start: 2025-08-25 | End: 2025-08-25 | Stop reason: HOSPADM

## 2025-08-25 RX ORDER — ONDANSETRON 2 MG/ML
4 INJECTION INTRAMUSCULAR; INTRAVENOUS ONCE AS NEEDED
Status: DISCONTINUED | OUTPATIENT
Start: 2025-08-25 | End: 2025-08-25 | Stop reason: HOSPADM

## 2025-08-25 RX ORDER — SODIUM CHLORIDE 0.9 % (FLUSH) 0.9 %
3 SYRINGE (ML) INJECTION EVERY 12 HOURS SCHEDULED
Status: DISCONTINUED | OUTPATIENT
Start: 2025-08-25 | End: 2025-08-25 | Stop reason: HOSPADM

## 2025-08-25 RX ORDER — SODIUM CHLORIDE 0.9 % (FLUSH) 0.9 %
3-10 SYRINGE (ML) INJECTION AS NEEDED
Status: DISCONTINUED | OUTPATIENT
Start: 2025-08-25 | End: 2025-08-25 | Stop reason: HOSPADM

## 2025-08-25 RX ORDER — PHENYLEPHRINE HYDROCHLORIDE 10 MG/ML
INJECTION INTRAVENOUS AS NEEDED
Status: DISCONTINUED | OUTPATIENT
Start: 2025-08-25 | End: 2025-08-25 | Stop reason: SURG

## 2025-08-25 RX ORDER — ATROPINE SULFATE 0.4 MG/ML
0.4 INJECTION, SOLUTION INTRAMUSCULAR; INTRAVENOUS; SUBCUTANEOUS ONCE AS NEEDED
Status: DISCONTINUED | OUTPATIENT
Start: 2025-08-25 | End: 2025-08-25 | Stop reason: HOSPADM

## 2025-08-25 RX ORDER — MIDAZOLAM HYDROCHLORIDE 1 MG/ML
0.5 INJECTION, SOLUTION INTRAMUSCULAR; INTRAVENOUS
Status: DISCONTINUED | OUTPATIENT
Start: 2025-08-25 | End: 2025-08-25 | Stop reason: HOSPADM

## 2025-08-25 RX ORDER — EPHEDRINE SULFATE 50 MG/ML
INJECTION, SOLUTION INTRAVENOUS AS NEEDED
Status: DISCONTINUED | OUTPATIENT
Start: 2025-08-25 | End: 2025-08-25 | Stop reason: SURG

## 2025-08-25 RX ORDER — LIDOCAINE HYDROCHLORIDE 10 MG/ML
0.5 INJECTION, SOLUTION INFILTRATION; PERINEURAL ONCE AS NEEDED
Status: DISCONTINUED | OUTPATIENT
Start: 2025-08-25 | End: 2025-08-25 | Stop reason: HOSPADM

## 2025-08-25 RX ORDER — PROTAMINE SULFATE 10 MG/ML
INJECTION, SOLUTION INTRAVENOUS AS NEEDED
Status: DISCONTINUED | OUTPATIENT
Start: 2025-08-25 | End: 2025-08-25 | Stop reason: SURG

## 2025-08-25 RX ORDER — PROPOFOL 10 MG/ML
VIAL (ML) INTRAVENOUS AS NEEDED
Status: DISCONTINUED | OUTPATIENT
Start: 2025-08-25 | End: 2025-08-25 | Stop reason: SURG

## 2025-08-25 RX ORDER — HEPARIN SODIUM 1000 [USP'U]/ML
INJECTION, SOLUTION INTRAVENOUS; SUBCUTANEOUS AS NEEDED
Status: DISCONTINUED | OUTPATIENT
Start: 2025-08-25 | End: 2025-08-25 | Stop reason: SURG

## 2025-08-25 RX ORDER — ROCURONIUM BROMIDE 10 MG/ML
INJECTION, SOLUTION INTRAVENOUS AS NEEDED
Status: DISCONTINUED | OUTPATIENT
Start: 2025-08-25 | End: 2025-08-25 | Stop reason: SURG

## 2025-08-25 RX ORDER — NALOXONE HCL 0.4 MG/ML
0.2 VIAL (ML) INJECTION AS NEEDED
Status: DISCONTINUED | OUTPATIENT
Start: 2025-08-25 | End: 2025-08-25 | Stop reason: HOSPADM

## 2025-08-25 RX ORDER — FENTANYL CITRATE 50 UG/ML
INJECTION, SOLUTION INTRAMUSCULAR; INTRAVENOUS AS NEEDED
Status: DISCONTINUED | OUTPATIENT
Start: 2025-08-25 | End: 2025-08-25 | Stop reason: SURG

## 2025-08-25 RX ORDER — FENTANYL CITRATE 50 UG/ML
50 INJECTION, SOLUTION INTRAMUSCULAR; INTRAVENOUS ONCE AS NEEDED
Status: DISCONTINUED | OUTPATIENT
Start: 2025-08-25 | End: 2025-08-25 | Stop reason: HOSPADM

## 2025-08-25 RX ORDER — HYDRALAZINE HYDROCHLORIDE 20 MG/ML
5 INJECTION INTRAMUSCULAR; INTRAVENOUS
Status: DISCONTINUED | OUTPATIENT
Start: 2025-08-25 | End: 2025-08-25 | Stop reason: HOSPADM

## 2025-08-25 RX ADMIN — PROTAMINE SULFATE 30 MG: 10 INJECTION, SOLUTION INTRAVENOUS at 09:06

## 2025-08-25 RX ADMIN — FAMOTIDINE 20 MG: 10 INJECTION INTRAVENOUS at 06:36

## 2025-08-25 RX ADMIN — ROCURONIUM BROMIDE 20 MG: 10 INJECTION, SOLUTION INTRAVENOUS at 08:34

## 2025-08-25 RX ADMIN — SUGAMMADEX 200 MG: 100 INJECTION, SOLUTION INTRAVENOUS at 09:20

## 2025-08-25 RX ADMIN — FENTANYL CITRATE 25 MCG: 50 INJECTION, SOLUTION INTRAMUSCULAR; INTRAVENOUS at 10:10

## 2025-08-25 RX ADMIN — PROPOFOL 150 MG: 10 INJECTION, EMULSION INTRAVENOUS at 07:31

## 2025-08-25 RX ADMIN — FENTANYL CITRATE 25 MCG: 50 INJECTION, SOLUTION INTRAMUSCULAR; INTRAVENOUS at 11:24

## 2025-08-25 RX ADMIN — ROCURONIUM BROMIDE 60 MG: 10 INJECTION, SOLUTION INTRAVENOUS at 07:32

## 2025-08-25 RX ADMIN — EPHEDRINE SULFATE 5 MG: 50 INJECTION INTRAVENOUS at 08:45

## 2025-08-25 RX ADMIN — PHENYLEPHRINE HYDROCHLORIDE 50 MCG: 10 INJECTION INTRAVENOUS at 07:31

## 2025-08-25 RX ADMIN — MIDAZOLAM 0.5 MG: 1 INJECTION INTRAMUSCULAR; INTRAVENOUS at 07:14

## 2025-08-25 RX ADMIN — FENTANYL CITRATE 25 MCG: 50 INJECTION, SOLUTION INTRAMUSCULAR; INTRAVENOUS at 09:29

## 2025-08-25 RX ADMIN — LIDOCAINE HYDROCHLORIDE 80 MG: 20 INJECTION, SOLUTION INFILTRATION; PERINEURAL at 07:31

## 2025-08-25 RX ADMIN — SODIUM CHLORIDE 9 ML/HR: 9 INJECTION, SOLUTION INTRAVENOUS at 06:35

## 2025-08-25 RX ADMIN — CEFAZOLIN 2000 MG: 2 INJECTION, POWDER, FOR SOLUTION INTRAMUSCULAR; INTRAVENOUS at 07:17

## 2025-08-25 RX ADMIN — TRAMADOL HYDROCHLORIDE 50 MG: 50 TABLET, COATED ORAL at 10:47

## 2025-08-25 RX ADMIN — FENTANYL CITRATE 25 MCG: 50 INJECTION, SOLUTION INTRAMUSCULAR; INTRAVENOUS at 10:03

## 2025-08-25 RX ADMIN — FENTANYL CITRATE 25 MCG: 50 INJECTION, SOLUTION INTRAMUSCULAR; INTRAVENOUS at 07:31

## 2025-08-25 RX ADMIN — ONDANSETRON 4 MG: 2 INJECTION, SOLUTION INTRAMUSCULAR; INTRAVENOUS at 09:20

## 2025-08-25 RX ADMIN — PHENYLEPHRINE HYDROCHLORIDE 50 MCG: 10 INJECTION INTRAVENOUS at 07:35

## 2025-08-25 RX ADMIN — DEXAMETHASONE SODIUM PHOSPHATE 8 MG: 4 INJECTION, SOLUTION INTRAMUSCULAR; INTRAVENOUS at 07:35

## 2025-08-25 RX ADMIN — PROPOFOL 100 MCG/KG/MIN: 10 INJECTION, EMULSION INTRAVENOUS at 07:35

## 2025-08-25 RX ADMIN — EPHEDRINE SULFATE 5 MG: 50 INJECTION INTRAVENOUS at 07:48

## 2025-08-25 RX ADMIN — HEPARIN SODIUM 5000 UNITS: 1000 INJECTION, SOLUTION INTRAVENOUS; SUBCUTANEOUS at 07:58

## (undated) DEVICE — SUT PROLN 7/0 BV175/6 D/A 24IN 8735H

## (undated) DEVICE — GLIDESHEATH SLENDER STAINLESS STEEL KIT: Brand: GLIDESHEATH SLENDER

## (undated) DEVICE — TUR/ENDOSCOPIC CABLE, 10' (3.05 M): Brand: CONMED

## (undated) DEVICE — SUT PROLN 5/0 RB1 D/A 36IN 8556H

## (undated) DEVICE — LOU CYSTO: Brand: MEDLINE INDUSTRIES, INC.

## (undated) DEVICE — SYRINGE, LUER LOCK, 60ML: Brand: MEDLINE

## (undated) DEVICE — PREP SOL POVIDONE/IODINE BT 4OZ

## (undated) DEVICE — BG ISOL DRWSTR INVISISHIELD 20X20IN

## (undated) DEVICE — TIDISHIELD UROLOGY DRAIN BAGS FROSTY VINYL STERILE FITS SIEMENS UROSKOP ACCESS 20 PER CASE: Brand: TIDISHIELD

## (undated) DEVICE — DRSNG TELFA PAD NONADH STR 1S 3X4IN

## (undated) DEVICE — HANDPIECE SET WITH COAXIAL HIGH FLOW TIP AND SUCTION TUBE: Brand: INTERPULSE

## (undated) DEVICE — CONTAINER,SPECIMEN,OR STERILE,4OZ: Brand: MEDLINE

## (undated) DEVICE — CEMENT MIXING SYSTEM WITH FEMORAL BREAKWAY NOZZLE: Brand: REVOLUTION

## (undated) DEVICE — DRAPE,U/ SHT,SPLIT,PLAS,STERIL: Brand: MEDLINE

## (undated) DEVICE — PK HIP TOTL 40

## (undated) DEVICE — SYR LUERLOK 20CC BX/50

## (undated) DEVICE — GLV SURG SIGNATURE ESSENTIAL PF LTX SZ7.5

## (undated) DEVICE — DRSNG GZ PETROLTM XEROFORM CURAD 1X8IN STRL

## (undated) DEVICE — 3M™ IOBAN™ 2 ANTIMICROBIAL INCISE DRAPE 6650EZ: Brand: IOBAN™ 2

## (undated) DEVICE — PATIENT RETURN ELECTRODE, SINGLE-USE, CONTACT QUALITY MONITORING, ADULT, WITH 9FT CORD, FOR PATIENTS WEIGING OVER 33LBS. (15KG): Brand: MEGADYNE

## (undated) DEVICE — SOL NS 500ML

## (undated) DEVICE — ANTIBACTERIAL UNDYED BRAIDED (POLYGLACTIN 910), SYNTHETIC ABSORBABLE SUTURE: Brand: COATED VICRYL

## (undated) DEVICE — DRAPE,REIN 53X77,STERILE: Brand: MEDLINE

## (undated) DEVICE — PK AV FISTL/SHNT 40

## (undated) DEVICE — SUT SILK 3/0 TIES 18IN A184H

## (undated) DEVICE — GLV SURG BIOGEL LTX PF 8

## (undated) DEVICE — SYR LL TP 10ML STRL

## (undated) DEVICE — KT MANIFLD CARDIAC

## (undated) DEVICE — CATH URETRL FLXITP POLLACK STD 5F 70CM

## (undated) DEVICE — SUT SILK 2/0 TIES 18IN A185H

## (undated) DEVICE — PENCL SMOKE/EVAC MEGADYNE TELESCP 10FT

## (undated) DEVICE — COAXIAL FEMORAL CANAL TIP

## (undated) DEVICE — GLV SURG SIGNATURE ESSENTIAL PF LTX SZ8.5

## (undated) DEVICE — TRAP FLD MINIVAC MEGADYNE 100ML

## (undated) DEVICE — APPL CHLORAPREP HI/LITE 26ML ORNG

## (undated) DEVICE — SUT PROLN 6/0 BV1 D/A 30IN 8709H

## (undated) DEVICE — DUAL CUT SAGITTAL BLADE

## (undated) DEVICE — SUT SILK 4/0 TIES 18IN A183H

## (undated) DEVICE — GLV SURG PREMIERPRO ORTHO LTX PF SZ8 BRN

## (undated) DEVICE — CATH DIAG IMPULSE FL3.5 5F 100CM

## (undated) DEVICE — VESSEL LOOPS X-RAY DETECTABLE: Brand: DEROYAL

## (undated) DEVICE — SUT SILK 3/0 SH CR8 18IN C013D

## (undated) DEVICE — PK CATH CARD 40

## (undated) DEVICE — PREP IM ENCHANCED TOTAL HIP BONE                                    PREPARATION KIT: Brand: PREP-IM

## (undated) DEVICE — TBG PENCL TELESCP MEGADYNE SMOKE EVAC 10FT

## (undated) DEVICE — GW EMR FIX EXCHG J STD .035 3MM 260CM

## (undated) DEVICE — CATH VENT MIV RADL PIG ST TIP 5F 110CM

## (undated) DEVICE — CATH DIAG IMPULSE FR4 5F 100CM